# Patient Record
Sex: MALE | Race: WHITE | HISPANIC OR LATINO | Employment: OTHER | URBAN - METROPOLITAN AREA
[De-identification: names, ages, dates, MRNs, and addresses within clinical notes are randomized per-mention and may not be internally consistent; named-entity substitution may affect disease eponyms.]

---

## 2017-05-12 ENCOUNTER — RESOLUTE PROFESSIONAL BILLING HOSPITAL PROF FEE (OUTPATIENT)
Dept: HOSPITALIST | Facility: MEDICAL CENTER | Age: 22
End: 2017-05-12
Payer: COMMERCIAL

## 2017-05-12 ENCOUNTER — OFFICE VISIT (OUTPATIENT)
Dept: MEDICAL GROUP | Facility: MEDICAL CENTER | Age: 22
End: 2017-05-12
Payer: COMMERCIAL

## 2017-05-12 ENCOUNTER — HOSPITAL ENCOUNTER (INPATIENT)
Facility: MEDICAL CENTER | Age: 22
LOS: 3 days | DRG: 871 | End: 2017-05-15
Attending: EMERGENCY MEDICINE | Admitting: INTERNAL MEDICINE
Payer: COMMERCIAL

## 2017-05-12 ENCOUNTER — APPOINTMENT (OUTPATIENT)
Dept: RADIOLOGY | Facility: MEDICAL CENTER | Age: 22
DRG: 871 | End: 2017-05-12
Attending: EMERGENCY MEDICINE
Payer: COMMERCIAL

## 2017-05-12 VITALS
HEART RATE: 103 BPM | BODY MASS INDEX: 37.39 KG/M2 | OXYGEN SATURATION: 84 % | TEMPERATURE: 98.7 F | WEIGHT: 211 LBS | SYSTOLIC BLOOD PRESSURE: 118 MMHG | HEIGHT: 63 IN | DIASTOLIC BLOOD PRESSURE: 84 MMHG

## 2017-05-12 DIAGNOSIS — Q90.9 DOWN SYNDROME: ICD-10-CM

## 2017-05-12 DIAGNOSIS — J18.9 CAP (COMMUNITY ACQUIRED PNEUMONIA): ICD-10-CM

## 2017-05-12 DIAGNOSIS — J18.9 PNEUMONIA DUE TO INFECTIOUS ORGANISM, UNSPECIFIED LATERALITY, UNSPECIFIED PART OF LUNG: ICD-10-CM

## 2017-05-12 DIAGNOSIS — J06.9 UPPER RESPIRATORY TRACT INFECTION, UNSPECIFIED TYPE: ICD-10-CM

## 2017-05-12 DIAGNOSIS — R09.02 HYPOXIA: ICD-10-CM

## 2017-05-12 DIAGNOSIS — E86.0 DEHYDRATION, MODERATE: ICD-10-CM

## 2017-05-12 PROBLEM — A41.9 SEPSIS, UNSPECIFIED: Status: ACTIVE | Noted: 2017-05-12

## 2017-05-12 LAB
ALBUMIN SERPL BCP-MCNC: 3.7 G/DL (ref 3.2–4.9)
ALBUMIN/GLOB SERPL: 0.8 G/DL
ALP SERPL-CCNC: 145 U/L (ref 30–99)
ALT SERPL-CCNC: 94 U/L (ref 2–50)
ANION GAP SERPL CALC-SCNC: 9 MMOL/L (ref 0–11.9)
APPEARANCE UR: CLEAR
APTT PPP: 26.6 SEC (ref 24.7–36)
AST SERPL-CCNC: 37 U/L (ref 12–45)
BASOPHILS # BLD AUTO: 0.6 % (ref 0–1.8)
BASOPHILS # BLD: 0.03 K/UL (ref 0–0.12)
BILIRUB SERPL-MCNC: 0.4 MG/DL (ref 0.1–1.5)
BILIRUB UR QL STRIP.AUTO: NEGATIVE
BNP SERPL-MCNC: 5 PG/ML (ref 0–100)
BUN SERPL-MCNC: 12 MG/DL (ref 8–22)
CALCIUM SERPL-MCNC: 8.3 MG/DL (ref 8.4–10.2)
CHLORIDE SERPL-SCNC: 105 MMOL/L (ref 96–112)
CO2 SERPL-SCNC: 23 MMOL/L (ref 20–33)
COLOR UR: YELLOW
CREAT SERPL-MCNC: 0.81 MG/DL (ref 0.5–1.4)
EOSINOPHIL # BLD AUTO: 0 K/UL (ref 0–0.51)
EOSINOPHIL NFR BLD: 0 % (ref 0–6.9)
ERYTHROCYTE [DISTWIDTH] IN BLOOD BY AUTOMATED COUNT: 47.8 FL (ref 35.9–50)
FLUAV H1 2009 PAND RNA SPEC QL NAA+PROBE: NOT DETECTED
FLUAV RNA SPEC QL NAA+PROBE: NEGATIVE
FLUAV+FLUBV AG SPEC QL IA: NORMAL
FLUBV RNA SPEC QL NAA+PROBE: NEGATIVE
GFR SERPL CREATININE-BSD FRML MDRD: >60 ML/MIN/1.73 M 2
GLOBULIN SER CALC-MCNC: 4.4 G/DL (ref 1.9–3.5)
GLUCOSE SERPL-MCNC: 175 MG/DL (ref 65–99)
GLUCOSE UR STRIP.AUTO-MCNC: NEGATIVE MG/DL
HCT VFR BLD AUTO: 43.9 % (ref 42–52)
HGB BLD-MCNC: 15.5 G/DL (ref 14–18)
IMM GRANULOCYTES # BLD AUTO: 0.02 K/UL (ref 0–0.11)
IMM GRANULOCYTES NFR BLD AUTO: 0.4 % (ref 0–0.9)
INR PPP: 0.97 (ref 0.87–1.13)
INR PPP: 0.97 (ref 0.87–1.13)
KETONES UR STRIP.AUTO-MCNC: NEGATIVE MG/DL
LACTATE BLD-SCNC: 1.28 MMOL/L (ref 0.5–2)
LACTATE BLD-SCNC: 1.38 MMOL/L (ref 0.5–2)
LACTATE BLD-SCNC: 1.82 MMOL/L (ref 0.5–2)
LEUKOCYTE ESTERASE UR QL STRIP.AUTO: NEGATIVE
LIPASE SERPL-CCNC: 23 U/L (ref 7–58)
LYMPHOCYTES # BLD AUTO: 1.09 K/UL (ref 1–4.8)
LYMPHOCYTES NFR BLD: 20.9 % (ref 22–41)
MAGNESIUM SERPL-MCNC: 1.9 MG/DL (ref 1.5–2.5)
MCH RBC QN AUTO: 32.8 PG (ref 27–33)
MCHC RBC AUTO-ENTMCNC: 35.3 G/DL (ref 33.7–35.3)
MCV RBC AUTO: 92.8 FL (ref 81.4–97.8)
MICRO URNS: NORMAL
MONOCYTES # BLD AUTO: 0.54 K/UL (ref 0–0.85)
MONOCYTES NFR BLD AUTO: 10.3 % (ref 0–13.4)
NEUTROPHILS # BLD AUTO: 3.54 K/UL (ref 1.82–7.42)
NEUTROPHILS NFR BLD: 67.8 % (ref 44–72)
NITRITE UR QL STRIP.AUTO: NEGATIVE
NRBC # BLD AUTO: 0 K/UL
NRBC BLD AUTO-RTO: 0 /100 WBC
PH UR STRIP.AUTO: 6 [PH]
PHOSPHATE SERPL-MCNC: 3.4 MG/DL (ref 2.5–4.5)
PLATELET # BLD AUTO: 236 K/UL (ref 164–446)
PMV BLD AUTO: 9.2 FL (ref 9–12.9)
POTASSIUM SERPL-SCNC: 4.1 MMOL/L (ref 3.6–5.5)
PROT SERPL-MCNC: 8.1 G/DL (ref 6–8.2)
PROT UR QL STRIP: NEGATIVE MG/DL
PROTHROMBIN TIME: 12.7 SEC (ref 12–14.6)
PROTHROMBIN TIME: 12.7 SEC (ref 12–14.6)
RBC # BLD AUTO: 4.73 M/UL (ref 4.7–6.1)
RBC UR QL AUTO: NEGATIVE
SIGNIFICANT IND 70042: NORMAL
SITE SITE: NORMAL
SODIUM SERPL-SCNC: 137 MMOL/L (ref 135–145)
SOURCE SOURCE: NORMAL
SP GR UR STRIP.AUTO: 1.02
SPECIMEN DRAWN FROM PATIENT: NORMAL
WBC # BLD AUTO: 5.2 K/UL (ref 4.8–10.8)

## 2017-05-12 PROCEDURE — 770020 HCHG ROOM/CARE - TELE (206)

## 2017-05-12 PROCEDURE — 83735 ASSAY OF MAGNESIUM: CPT

## 2017-05-12 PROCEDURE — 700101 HCHG RX REV CODE 250: Performed by: INTERNAL MEDICINE

## 2017-05-12 PROCEDURE — 94760 N-INVAS EAR/PLS OXIMETRY 1: CPT

## 2017-05-12 PROCEDURE — 94640 AIRWAY INHALATION TREATMENT: CPT

## 2017-05-12 PROCEDURE — 87633 RESP VIRUS 12-25 TARGETS: CPT

## 2017-05-12 PROCEDURE — 83605 ASSAY OF LACTIC ACID: CPT

## 2017-05-12 PROCEDURE — 81003 URINALYSIS AUTO W/O SCOPE: CPT

## 2017-05-12 PROCEDURE — 96365 THER/PROPH/DIAG IV INF INIT: CPT

## 2017-05-12 PROCEDURE — 83690 ASSAY OF LIPASE: CPT

## 2017-05-12 PROCEDURE — 84100 ASSAY OF PHOSPHORUS: CPT

## 2017-05-12 PROCEDURE — 99223 1ST HOSP IP/OBS HIGH 75: CPT | Performed by: INTERNAL MEDICINE

## 2017-05-12 PROCEDURE — 700111 HCHG RX REV CODE 636 W/ 250 OVERRIDE (IP): Performed by: EMERGENCY MEDICINE

## 2017-05-12 PROCEDURE — 87040 BLOOD CULTURE FOR BACTERIA: CPT | Mod: 91

## 2017-05-12 PROCEDURE — 93005 ELECTROCARDIOGRAM TRACING: CPT | Performed by: INTERNAL MEDICINE

## 2017-05-12 PROCEDURE — 96367 TX/PROPH/DG ADDL SEQ IV INF: CPT

## 2017-05-12 PROCEDURE — 85025 COMPLETE CBC W/AUTO DIFF WBC: CPT

## 2017-05-12 PROCEDURE — 83880 ASSAY OF NATRIURETIC PEPTIDE: CPT

## 2017-05-12 PROCEDURE — 700105 HCHG RX REV CODE 258

## 2017-05-12 PROCEDURE — 700105 HCHG RX REV CODE 258: Performed by: EMERGENCY MEDICINE

## 2017-05-12 PROCEDURE — 87503 INFLUENZA DNA AMP PROB ADDL: CPT

## 2017-05-12 PROCEDURE — 700105 HCHG RX REV CODE 258: Performed by: INTERNAL MEDICINE

## 2017-05-12 PROCEDURE — 93010 ELECTROCARDIOGRAM REPORT: CPT | Performed by: INTERNAL MEDICINE

## 2017-05-12 PROCEDURE — 99213 OFFICE O/P EST LOW 20 MIN: CPT | Performed by: FAMILY MEDICINE

## 2017-05-12 PROCEDURE — 700111 HCHG RX REV CODE 636 W/ 250 OVERRIDE (IP)

## 2017-05-12 PROCEDURE — 87502 INFLUENZA DNA AMP PROBE: CPT

## 2017-05-12 PROCEDURE — 85610 PROTHROMBIN TIME: CPT

## 2017-05-12 PROCEDURE — 87086 URINE CULTURE/COLONY COUNT: CPT

## 2017-05-12 PROCEDURE — 86713 LEGIONELLA ANTIBODY: CPT

## 2017-05-12 PROCEDURE — 700101 HCHG RX REV CODE 250: Performed by: EMERGENCY MEDICINE

## 2017-05-12 PROCEDURE — 71010 DX-CHEST-PORTABLE (1 VIEW): CPT

## 2017-05-12 PROCEDURE — 80053 COMPREHEN METABOLIC PANEL: CPT

## 2017-05-12 PROCEDURE — 85730 THROMBOPLASTIN TIME PARTIAL: CPT

## 2017-05-12 PROCEDURE — 36415 COLL VENOUS BLD VENIPUNCTURE: CPT

## 2017-05-12 PROCEDURE — 700102 HCHG RX REV CODE 250 W/ 637 OVERRIDE(OP): Performed by: INTERNAL MEDICINE

## 2017-05-12 PROCEDURE — 87400 INFLUENZA A/B EACH AG IA: CPT

## 2017-05-12 PROCEDURE — A9270 NON-COVERED ITEM OR SERVICE: HCPCS | Performed by: INTERNAL MEDICINE

## 2017-05-12 PROCEDURE — 96375 TX/PRO/DX INJ NEW DRUG ADDON: CPT

## 2017-05-12 PROCEDURE — 700111 HCHG RX REV CODE 636 W/ 250 OVERRIDE (IP): Performed by: INTERNAL MEDICINE

## 2017-05-12 PROCEDURE — 99285 EMERGENCY DEPT VISIT HI MDM: CPT

## 2017-05-12 RX ORDER — KETOROLAC TROMETHAMINE 30 MG/ML
30 INJECTION, SOLUTION INTRAMUSCULAR; INTRAVENOUS EVERY 6 HOURS PRN
Status: DISCONTINUED | OUTPATIENT
Start: 2017-05-12 | End: 2017-05-15 | Stop reason: HOSPADM

## 2017-05-12 RX ORDER — IPRATROPIUM BROMIDE AND ALBUTEROL SULFATE 2.5; .5 MG/3ML; MG/3ML
3 SOLUTION RESPIRATORY (INHALATION)
Status: DISCONTINUED | OUTPATIENT
Start: 2017-05-12 | End: 2017-05-14

## 2017-05-12 RX ORDER — SODIUM CHLORIDE 9 MG/ML
1000 INJECTION, SOLUTION INTRAVENOUS ONCE
Status: COMPLETED | OUTPATIENT
Start: 2017-05-12 | End: 2017-05-12

## 2017-05-12 RX ORDER — IBUPROFEN 200 MG
400 TABLET ORAL EVERY 6 HOURS PRN
Status: DISCONTINUED | OUTPATIENT
Start: 2017-05-12 | End: 2017-05-13

## 2017-05-12 RX ORDER — PROMETHAZINE HYDROCHLORIDE 25 MG/1
12.5-25 TABLET ORAL EVERY 4 HOURS PRN
Status: DISCONTINUED | OUTPATIENT
Start: 2017-05-12 | End: 2017-05-15 | Stop reason: HOSPADM

## 2017-05-12 RX ORDER — ONDANSETRON 2 MG/ML
4 INJECTION INTRAMUSCULAR; INTRAVENOUS EVERY 4 HOURS PRN
Status: DISCONTINUED | OUTPATIENT
Start: 2017-05-12 | End: 2017-05-15 | Stop reason: HOSPADM

## 2017-05-12 RX ORDER — SODIUM CHLORIDE 9 MG/ML
INJECTION, SOLUTION INTRAVENOUS CONTINUOUS
Status: DISCONTINUED | OUTPATIENT
Start: 2017-05-12 | End: 2017-05-13

## 2017-05-12 RX ORDER — ONDANSETRON 4 MG/1
4 TABLET, ORALLY DISINTEGRATING ORAL EVERY 4 HOURS PRN
Status: DISCONTINUED | OUTPATIENT
Start: 2017-05-12 | End: 2017-05-15 | Stop reason: HOSPADM

## 2017-05-12 RX ORDER — POLYETHYLENE GLYCOL 3350 17 G/17G
1 POWDER, FOR SOLUTION ORAL
Status: DISCONTINUED | OUTPATIENT
Start: 2017-05-12 | End: 2017-05-15 | Stop reason: HOSPADM

## 2017-05-12 RX ORDER — ONDANSETRON 2 MG/ML
4 INJECTION INTRAMUSCULAR; INTRAVENOUS ONCE
Status: DISCONTINUED | OUTPATIENT
Start: 2017-05-12 | End: 2017-05-12

## 2017-05-12 RX ORDER — KETOROLAC TROMETHAMINE 30 MG/ML
30 INJECTION, SOLUTION INTRAMUSCULAR; INTRAVENOUS ONCE
Status: COMPLETED | OUTPATIENT
Start: 2017-05-12 | End: 2017-05-12

## 2017-05-12 RX ORDER — AMPICILLIN AND SULBACTAM 2; 1 G/1; G/1
3 INJECTION, POWDER, FOR SOLUTION INTRAMUSCULAR; INTRAVENOUS ONCE
Status: COMPLETED | OUTPATIENT
Start: 2017-05-12 | End: 2017-05-12

## 2017-05-12 RX ORDER — PROMETHAZINE HYDROCHLORIDE 25 MG/1
12.5-25 SUPPOSITORY RECTAL EVERY 4 HOURS PRN
Status: DISCONTINUED | OUTPATIENT
Start: 2017-05-12 | End: 2017-05-15 | Stop reason: HOSPADM

## 2017-05-12 RX ORDER — BISACODYL 10 MG
10 SUPPOSITORY, RECTAL RECTAL
Status: DISCONTINUED | OUTPATIENT
Start: 2017-05-12 | End: 2017-05-15 | Stop reason: HOSPADM

## 2017-05-12 RX ORDER — IPRATROPIUM BROMIDE AND ALBUTEROL SULFATE 2.5; .5 MG/3ML; MG/3ML
3 SOLUTION RESPIRATORY (INHALATION)
Status: DISCONTINUED | OUTPATIENT
Start: 2017-05-12 | End: 2017-05-15 | Stop reason: HOSPADM

## 2017-05-12 RX ORDER — SODIUM CHLORIDE 9 MG/ML
1000 INJECTION, SOLUTION INTRAVENOUS
Status: DISCONTINUED | OUTPATIENT
Start: 2017-05-12 | End: 2017-05-15 | Stop reason: HOSPADM

## 2017-05-12 RX ORDER — ACETAMINOPHEN 325 MG/1
650 TABLET ORAL EVERY 6 HOURS PRN
Status: DISCONTINUED | OUTPATIENT
Start: 2017-05-12 | End: 2017-05-15 | Stop reason: HOSPADM

## 2017-05-12 RX ORDER — SODIUM CHLORIDE 9 MG/ML
2000 INJECTION, SOLUTION INTRAVENOUS ONCE
Status: COMPLETED | OUTPATIENT
Start: 2017-05-12 | End: 2017-05-12

## 2017-05-12 RX ORDER — AMOXICILLIN 250 MG
2 CAPSULE ORAL 2 TIMES DAILY
Status: DISCONTINUED | OUTPATIENT
Start: 2017-05-12 | End: 2017-05-15 | Stop reason: HOSPADM

## 2017-05-12 RX ORDER — IPRATROPIUM BROMIDE AND ALBUTEROL SULFATE 2.5; .5 MG/3ML; MG/3ML
3 SOLUTION RESPIRATORY (INHALATION)
Status: COMPLETED | OUTPATIENT
Start: 2017-05-12 | End: 2017-05-12

## 2017-05-12 RX ADMIN — ENOXAPARIN SODIUM 40 MG: 100 INJECTION SUBCUTANEOUS at 14:39

## 2017-05-12 RX ADMIN — CEFTRIAXONE 2 G: 2 INJECTION, POWDER, FOR SOLUTION INTRAMUSCULAR; INTRAVENOUS at 16:22

## 2017-05-12 RX ADMIN — SODIUM CHLORIDE 2000 ML: 9 INJECTION, SOLUTION INTRAVENOUS at 14:20

## 2017-05-12 RX ADMIN — IBUPROFEN 400 MG: 200 TABLET, FILM COATED ORAL at 14:42

## 2017-05-12 RX ADMIN — IPRATROPIUM BROMIDE AND ALBUTEROL SULFATE 3 ML: .5; 3 SOLUTION RESPIRATORY (INHALATION) at 10:53

## 2017-05-12 RX ADMIN — SODIUM CHLORIDE 1000 ML: 9 INJECTION, SOLUTION INTRAVENOUS at 23:13

## 2017-05-12 RX ADMIN — IPRATROPIUM BROMIDE AND ALBUTEROL SULFATE 3 ML: .5; 3 SOLUTION RESPIRATORY (INHALATION) at 14:11

## 2017-05-12 RX ADMIN — AMPICILLIN SODIUM AND SULBACTAM SODIUM 3 G: 2; 1 INJECTION, POWDER, FOR SOLUTION INTRAMUSCULAR; INTRAVENOUS at 10:45

## 2017-05-12 RX ADMIN — SENNOSIDES AND DOCUSATE SODIUM 2 TABLET: 8.6; 5 TABLET ORAL at 21:59

## 2017-05-12 RX ADMIN — KETOROLAC TROMETHAMINE 30 MG: 30 INJECTION, SOLUTION INTRAMUSCULAR at 12:05

## 2017-05-12 RX ADMIN — IPRATROPIUM BROMIDE AND ALBUTEROL SULFATE 3 ML: .5; 3 SOLUTION RESPIRATORY (INHALATION) at 23:33

## 2017-05-12 RX ADMIN — SODIUM CHLORIDE 1000 ML: 9 INJECTION, SOLUTION INTRAVENOUS at 10:45

## 2017-05-12 RX ADMIN — IPRATROPIUM BROMIDE AND ALBUTEROL SULFATE 3 ML: .5; 3 SOLUTION RESPIRATORY (INHALATION) at 20:30

## 2017-05-12 RX ADMIN — SODIUM CHLORIDE: 9 INJECTION, SOLUTION INTRAVENOUS at 15:41

## 2017-05-12 RX ADMIN — AZITHROMYCIN MONOHYDRATE 500 MG: 500 INJECTION, POWDER, LYOPHILIZED, FOR SOLUTION INTRAVENOUS at 11:35

## 2017-05-12 ASSESSMENT — PAIN SCALES - GENERAL: PAINLEVEL_OUTOF10: 0

## 2017-05-12 ASSESSMENT — LIFESTYLE VARIABLES
EVER_SMOKED: NEVER
DO YOU DRINK ALCOHOL: NO
EVER_SMOKED: NEVER

## 2017-05-12 NOTE — ASSESSMENT & PLAN NOTE
This 21-year-old with Down syndrome started with URI symptoms 4 days ago. He had cough and a runny nose. 2 half days ago he began vomiting. He is drinking fluids but then immediately vomits them up. His fever has been as high as 102 but comes down with Tylenol. He had a Tylenol dose morning. Parents deny any history of diabetes.

## 2017-05-12 NOTE — IP AVS SNAPSHOT
" Home Care Instructions                                                                                                                  Name:Sander Romero  Medical Record Number:[id number]  CSN: [id number]    YOB: 1995   Age: 21 y.o.  Sex: male  HT:1.626 m (5' 4\") WT: 95.6 kg (210 lb 12.2 oz)          Admit Date: 5/12/2017     Discharge Date:   Today's Date: 5/15/2017  Attending Doctor:  Olena Morales M.D.                  Allergies:  Review of patient's allergies indicates no known allergies.            Discharge Instructions       Discharge Instructions    Discharged to home by car with relative. Discharged via wheelchair, hospital escort: Yes.  Special equipment needed: Not Applicable    Be sure to schedule a follow-up appointment with your primary care doctor or any specialists as instructed.     Discharge Plan:   Diet Plan: Discussed  Activity Level: Discussed  Confirmed Follow up Appointment: Patient to Call and Schedule Appointment  Confirmed Symptoms Management: Discussed  Medication Reconciliation Updated: Yes    I understand that a diet low in cholesterol, fat, and sodium is recommended for good health. Unless I have been given specific instructions below for another diet, I accept this instruction as my diet prescription.   Other diet: Regular diet    Special Instructions: None    · Is patient discharged on Warfarin / Coumadin?   No     · Is patient Post Blood Transfusion?  No    Depression / Suicide Risk    As you are discharged from this [facility] facility, it is important to learn how to keep safe from harming yourself.    Recognize the warning signs:  · Abrupt changes in personality, positive or negative- including increase in energy   · Giving away possessions  · Change in eating patterns- significant weight changes-  positive or negative  · Change in sleeping patterns- unable to sleep or sleeping all the time   · Unwillingness or inability to " communicate  · Depression  · Unusual sadness, discouragement and loneliness  · Talk of wanting to die  · Neglect of personal appearance   · Rebelliousness- reckless behavior  · Withdrawal from people/activities they love  · Confusion- inability to concentrate     If you or a loved one observes any of these behaviors or has concerns about self-harm, here's what you can do:  · Talk about it- your feelings and reasons for harming yourself  · Remove any means that you might use to hurt yourself (examples: pills, rope, extension cords, firearm)  · Get professional help from the community (Mental Health, Substance Abuse, psychological counseling)  · Do not be alone:Call your Safe Contact- someone whom you trust who will be there for you.  · Call your local CRISIS HOTLINE 621-4205 or 500-835-4813  · Call your local Children's Mobile Crisis Response Team [place] (857) 583-3637 or www.[employer]  · Call the toll free National Suicide Prevention Hotlines   · National Suicide Prevention Lifeline 798-961-UOED (5444)  · LSEO Line Network 800-SUICIDE (189-3404)    Pneumonia, Adult  Pneumonia is an infection of the lungs.   CAUSES  Pneumonia may be caused by bacteria or a virus. Usually, the infection is caused by breathing in droplets from an infected person's cough or sneeze.   SYMPTOMS   Symptoms of pneumonia include:  · Cough.  · Fever.  · Chest pain.  · Rapid breathing.  · Shortness of breath.  · Shaking chills.  · Mucus production.  DIAGNOSIS   If you have the common symptoms of pneumonia, often your health care provider will confirm the diagnosis with a chest X-ray. The X-ray will show an abnormality in the lung if you have pneumonia. Other tests may be done on your blood, urine, or mucus (sputum) to find the specific cause of your pneumonia. A blood gas test or pulse oximetry test may be needed to check how well your lungs are working.  TREATMENT   Your treatment will depend on whether your pneumonia  is caused by bacteria or a virus.   2. Bacterial pneumonia is treated with antibiotic medicine.  3. Pneumonia that is caused by the influenza virus may be treated with an antiviral medicine.  4. Pneumonia that is caused by a virus other than influenza will not respond to antibiotic medicine. This type of pneumonia will have to run its course.   HOME CARE INSTRUCTIONS   2. Cough suppressants may be used if you are losing too much rest from coughing at night. However, you should try to avoid taking cough suppresants. This is because coughing helps to remove mucus from your lungs.  3. Sleep in a semi-upright position at night. Try sleeping in a reclining chair, or place a few pillows under your head.  4. Try using a cold steam vaporizer or humidifier in your home or bedroom. This may help loosen your mucus.  5. If you were prescribed an antibiotic medicine, finish all of it even if you start to feel better.  6. If you were prescribed an expectorant, take it as directed by your health care provider. This medicine loosens the mucus so you can cough it up.  7. Take medicines only as directed by your health care provider.  8. Do not smoke. If you are a smoker and continue to smoke, your cough may last several weeks after your pneumonia has cleared.  9. Get rest when you feel tired, or as needed.  PREVENTION  A pneumococcal shot (vaccine) is available to prevent a common bacterial cause of pneumonia. This is usually suggested for:  2. People over 65 years old.  3. People on chemotherapy.  4. People with chronic lung problems, such as bronchitis or emphysema.  5. People with immune system problems.  If you are over 65 years old or have a high risk condition, you may receive the pneumococcal vaccine if you have not received it before. In some countries, a routine influenza vaccine is also recommended. This vaccine can help prevent some cases of pneumonia. You may be offered the influenza vaccine as part of your care.  If you  are a smoker, it is time to quit in order to prevent pneumonia in the future. You may receive instructions on how to stop smoking. Your health care provider can provide medicines and counseling to help you quit.  SEEK MEDICAL CARE IF:  2. You have a fever.  3. You cannot control your cough with suppressants at night, and you keep losing sleep.  SEEK IMMEDIATE MEDICAL CARE IF:   · You have worsening shortness of breath.  · You have increased chest pain.  · Your sickness becomes worse, especially if you are an older adult or have a weakened immune system.  · You cough up blood.  · You have pain that is getting worse or is not controlled with medicines.  · Your symptoms are getting worse rather than better.     This information is not intended to replace advice given to you by your health care provider. Make sure you discuss any questions you have with your health care provider.     Document Released: 12/18/2006 Document Revised: 01/08/2016 Document Reviewed: 04/13/2016  [employer] Interactive Patient Education ©2016 [employer]      Follow-up Information     1. Follow up with JOSE MIGUEL Slade.    Specialty:  Family Medicine    Why:  F/U WITH CARDIOLOGY IN THE NEXT FEW WEEKS,     Contact information    [address]  [address]  [facility] [id number] [phone]           Discharge Medication Instructions:    Below are the medications your physician expects you to take upon discharge:    Review all your home medications and newly ordered medications with your doctor and/or pharmacist. Follow medication instructions as directed by your doctor and/or pharmacist.    Please keep your medication list with you and share with your physician.               Medication List      START taking these medications        Instructions    Morning Afternoon Evening Bedtime    azithromycin 500 MG tablet   Commonly known as:  ZITHROMAX        Take 0.5 Tabs by mouth every day.   Dose:  250 mg                        cefdinir 300 MG  Caps   Commonly known as:  OMNICEF        Take 1 Cap by mouth 2 times a day.   Dose:  300 mg                        ipratropium-albuterol 0.5-2.5 (3) MG/3ML nebulizer solution   Last time this was given:  3 mL on 5/15/2017  6:45 AM   Commonly known as:  DUKATHIEB        Doctor's comments:  DISP FOR 1 MONTH   3 mL by Nebulization route every four hours as needed for Shortness of Breath.   Dose:  3 mL                        oseltamivir 75 MG Caps   Last time this was given:  75 mg on 5/15/2017  9:12 AM   Commonly known as:  TAMIFLU        Take 1 Cap by mouth every 12 hours.   Dose:  75 mg                          CONTINUE taking these medications        Instructions    Morning Afternoon Evening Bedtime    VITAMIN D PO        Take 1 Cap by mouth every day.   Dose:  1 Cap                             Where to Get Your Medications      You can get these medications from any pharmacy     Bring a paper prescription for each of these medications    - azithromycin 500 MG tablet  - cefdinir 300 MG Caps  - ipratropium-albuterol 0.5-2.5 (3) MG/3ML nebulizer solution  - oseltamivir 75 MG Caps            Orders for after discharge     DME Nebulizer    Complete by:  As directed    Small volume nebulizer and supplies.       DME Pulse Oximeter    Complete by:  As directed    [id number]             Instructions           Diet / Nutrition:    Follow any diet instructions given to you by your doctor or the dietician, including how much salt (sodium) you are allowed each day.    If you are overweight, talk to your doctor about a weight reduction plan.    Activity:    Remain physically active following your doctor's instructions about exercise and activity.    Rest often.     Any time you become even a little tired or short of breath, SIT DOWN and rest.    Worsening Symptoms:    Report any of the following signs and symptoms to the doctor's office immediately:    *Pain of jaw, arm, or neck  *Chest pain not relieved by medication                                *Dizziness or loss of consciousness  *Difficulty breathing even when at rest   *More tired than usual                                       *Bleeding drainage or swelling of surgical site  *Swelling of feet, ankles, legs or stomach                 *Fever (>100ºF)  *Pink or blood tinged sputum  *Weight gain (3lbs/day or 5lbs /week)           *Shock from internal defibrillator (if applicable)  *Palpitations or irregular heartbeats                *Cool and/or numb extremities    Stroke Awareness    Common Risk Factors for Stroke include:    Age  Atrial Fibrillation  Carotid Artery Stenosis  Diabetes Mellitus  Excessive alcohol consumption  High blood pressure  Overweight   Physical inactivity  Smoking    Warning signs and symptoms of a stroke include:    *Sudden numbness or weakness of the face, arm or leg (especially on one side of the body).  *Sudden confusion, trouble speaking or understanding.  *Sudden trouble seeing in one or both eyes.  *Sudden trouble walking, dizziness, loss of balance or coordination.Sudden severe headache with no known cause.    It is very important to get treatment quickly when a stroke occurs. If you experience any of the above warning signs, call 911 immediately.                   Disclaimer         Quit Smoking / Tobacco Use:    I understand the use of any tobacco products increases my chance of suffering from future heart disease or stroke and could cause other illnesses which may shorten my life. Quitting the use of tobacco products is the single most important thing I can do to improve my health. For further information on smoking / tobacco cessation call a Toll Free Quit Line at 1-649.378.8406 (*National Cancer [place]) or 1-150.792.1279 (American Lung Association) or you can access the web based program at www.lungusa.org.    [place] Tobacco Users Help Line:  (789) 762-9209       Toll Free: 1-171.118.5406  Quit Tobacco Program [facility]  (966) 275-2293    Crisis Hotline:    [place] Crisis Hotline:  8-743-XSGFAWY or 1-843.544.7500    [place] Crisis Hotline:    1-525.789.2504 or 890-604-5112    Discharge Survey:   Thank you for choosing [facility]. We hope we did everything we could to make your hospital stay a pleasant one. You may be receiving a phone survey and we would appreciate your time and participation in answering the questions. Your input is very valuable to us in our efforts to improve our service to our patients and their families.        My signature on this form indicates that:    1. I have reviewed and understand the above information.  2. My questions regarding this information have been answered to my satisfaction.  3. I have formulated a plan with my discharge nurse to obtain my prescribed medications for home.                  Disclaimer         __________________________________                     __________       ________                       Patient Signature                                                 Date                    Time

## 2017-05-12 NOTE — ED PROVIDER NOTES
"ED Provider Note    CHIEF COMPLAINT  Chief Complaint   Patient presents with   • Cough   • N/V       HPI  Sander Romero is a 21 y.o. male who presents with cough, cold, nausea and vomiting. Patient has Down syndrome. He was sent from urgent care. He was hypoxic there at urgent care. According to family said cough cold congestion for the last few days. He is a little bit of nausea and vomiting. Difficult to obtain her review of systems from the patient. But according the family no obvious headache nor chest pain or abdominal pain.    REVIEW OF SYSTEMS  Occult to obtain because of the patient's Down syndrome.  ALL OTHER SYSTEMS NEGATIVE    ALLERGIES  No Known Allergies        PAST MEDICAL HISTORY  Past Medical History   Diagnosis Date   • Down syndrome      trisomy 21   • Impaired fasting glucose    • Vitamin D deficiency    • Low HDL (under 40)    • Sleep apnea      work up done as a child.  no tx   • Undescended testicle        SURGICAL HISTORY  Past Surgical History   Procedure Laterality Date   • Other       surgery for undescended testies.   • Tonsillectomy and adenoidectomy     • Testicle exploration       undescended testicle   • Myringotomy         FAMILY HISTORY  Family History   Problem Relation Age of Onset   • Heart Disease Mother      congenital heart defect   • Diabetes Maternal Grandfather    • Diabetes Paternal Grandmother    • Diabetes Paternal Grandfather        SOCIAL HISTORY  Family at the bedside. Patient has Down syndrome.    PHYSICAL EXAM  GENERAL: Awake male adult with obvious Down syndrome characteristics.  VITAL SIGNS: /70 mmHg  Pulse 108  Temp(Src) 36.9 °C (98.5 °F)  Resp 20  Ht 1.626 m (5' 4\")  Wt 95.6 kg (210 lb 12.2 oz)  BMI 36.16 kg/m2   Constitutional: Awake male adult HENT: Scalp is normal size and nontender. Ears are clear. Nose is clear. Throat is clear with no stridor no drooling no trismus. Teeth are all intact.  Eyes: Pupils equal round and reactive to light, " extraocular motor fall. There is no scleral icterus.  Neck: Neck is supple and nontender. There is no meningismus. No adenitis. No thyromegaly.  Lymphatic: No adenopathy.   Cardiovascular: Heart regular rhythm without murmurs or gallops   Thorax & Lungs: No chest wall tenderness. Lungs are congested. Patient has good breath sounds bilateral. [place] Rales and rhonchi  Abdomen: Abdomen is soft, nontender, not rigid, no guarding, and no organomegaly. There is no palpable hernia   Skin: Warm, pink, and dry with no erythema and no rash.   Back: Nontender, no midline bony tenderness, no flank tenderness.  Extremities: Full range of motion  No tenderness to palpation and no deformities noted. No calf or thigh swelling. No calf or thigh tenderness. No clinical DVT.  Neurologic: Alert & oriented . Cranial nerves are grossly intact as tested. Patient moves all 4 extremities well. Patient has good strong flexion and extension of the ankle joints knee joints hip joints and elbow joints. Sensation is normal and symmetrical in the upper and lower extremities.   Psychiatric: Patient is alert oriented coherent and rational.     RADIOLOGY/PROCEDURES  DX-CHEST-PORTABLE (1 VIEW)   Final Result      1.  Bilateral pulmonary airspace process.      2.  Differential diagnosis includes atypical pneumonia or edema            COURSE & MEDICAL DECISION MAKING  Patient arrives for evaluation of cough and difficulty breathing with nausea and vomiting. Differential diagnosis: Bronchitis, pneumonia, sepsis, dehydration, etc.    Plan: #1 IV #2 cardiac monitor, pulse ox monitor, blood pressure monitor. #3 a bolus of IV fluids #4. Lab including CBC, CMP, blood cultures, influenza screen, lactate level, etc. Chest x-ray. #5. Intravenous Zithromax and Rocephin after blood cultures obtained #7. Admission to the hospital after ED evaluation. #8. Review of previous medical records    Review of previous medical records: Down syndrome.    Laboratory and  reexamination: CBC is normal. No anemia. No bandemia. INR is normal. Chemistry panel is normal. Glucose 175. Alk phos and SGPT are elevated slightly. Chest x-ray and symptomatology and physical findings are consistent with pneumonia.  Results for orders placed or performed during the hospital encounter of 05/12/17   CBC w/ Differential   Result Value Ref Range    WBC 5.2 4.8 - 10.8 K/uL    RBC 4.73 4.70 - 6.10 M/uL    Hemoglobin 15.5 14.0 - 18.0 g/dL    Hematocrit 43.9 42.0 - 52.0 %    MCV 92.8 81.4 - 97.8 fL    MCH 32.8 27.0 - 33.0 pg    MCHC 35.3 33.7 - 35.3 g/dL    RDW 47.8 35.9 - 50.0 fL    Platelet Count 236 164 - 446 K/uL    MPV 9.2 9.0 - 12.9 fL    Neutrophils-Polys 67.80 44.00 - 72.00 %    Lymphocytes 20.90 (L) 22.00 - 41.00 %    Monocytes 10.30 0.00 - 13.40 %    Eosinophils 0.00 0.00 - 6.90 %    Basophils 0.60 0.00 - 1.80 %    Immature Granulocytes 0.40 0.00 - 0.90 %    Nucleated RBC 0.00 /100 WBC    Neutrophils (Absolute) 3.54 1.82 - 7.42 K/uL    Lymphs (Absolute) 1.09 1.00 - 4.80 K/uL    Monos (Absolute) 0.54 0.00 - 0.85 K/uL    Eos (Absolute) 0.00 0.00 - 0.51 K/uL    Baso (Absolute) 0.03 0.00 - 0.12 K/uL    Immature Granulocytes (abs) 0.02 0.00 - 0.11 K/uL    NRBC (Absolute) 0.00 K/uL   Complete Metabolic Panel (CMP)   Result Value Ref Range    Sodium 137 135 - 145 mmol/L    Potassium 4.1 3.6 - 5.5 mmol/L    Chloride 105 96 - 112 mmol/L    Co2 23 20 - 33 mmol/L    Anion Gap 9.0 0.0 - 11.9    Glucose 175 (H) 65 - 99 mg/dL    Bun 12 8 - 22 mg/dL    Creatinine 0.81 0.50 - 1.40 mg/dL    Calcium 8.3 (L) 8.4 - 10.2 mg/dL    AST(SGOT) 37 12 - 45 U/L    ALT(SGPT) 94 (H) 2 - 50 U/L    Alkaline Phosphatase 145 (H) 30 - 99 U/L    Total Bilirubin 0.4 0.1 - 1.5 mg/dL    Albumin 3.7 3.2 - 4.9 g/dL    Total Protein 8.1 6.0 - 8.2 g/dL    Globulin 4.4 (H) 1.9 - 3.5 g/dL    A-G Ratio 0.8 g/dL   Lipase   Result Value Ref Range    Lipase 23 7 - 58 U/L   LACTIC ACID   Result Value Ref Range    Lactic Acid 1.82 0.50 - 2.00  mmol/L    Specimen Venous    PT/INR   Result Value Ref Range    PT 12.7 12.0 - 14.6 sec    INR 0.97 0.87 - 1.13   Influenza Rapid   Result Value Ref Range    Significant Indicator NEG     Source RESP     Site RESPIRATORY     Rapid Influenza A-B       Negative for Influenza A and Influenza B antigens.  Infection due to influenza A or B cannot be ruled out  since the antigen present in the specimen may be below the  detection limit of the test. Culture confirmation of  negative samples is recommended.     ESTIMATED GFR   Result Value Ref Range    GFR If African American >60 >60 mL/min/1.73 m 2    GFR If Non African American >60 >60 mL/min/1.73 m 2         The hospital assessment: The case discussed. The patient stable on admission after 12 noon.  FINAL IMPRESSION  1. Hypoxia and difficulty breathing  2. Pneumonia  3. Down syndrome         Electronically signed by: Gary Gansert, 5/12/2017 /noon

## 2017-05-12 NOTE — FLOWSHEET NOTE
Patient admitted to med/tele and placed on Q 4 nebulizer as ordered.     05/12/17 1410   RT Assessment of Delivered Medications   Evaluation of Medication Delivery Daily Yes-- Pt /Family has been Instructed in use of Respiratory Medications and Adverse Reactions   SVN Group   #SVN Performed Yes   Given By: Mask   Chest Exam   Work Of Breathing / Effort Mild   Respiration (!) 22   Pulse 98   Breath Sounds   RUL Breath Sounds Coarse Crackles;Diminished   RML Breath Sounds Coarse Crackles;Diminished   RLL Breath Sounds Coarse Crackles;Diminished   NIKHIL Breath Sounds Coarse Crackles;Diminished   LLL Breath Sounds Coarse Crackles;Diminished   Secretions   Cough Congested;Moist;Non Productive   Oximetry   #Pulse Oximetry (Single Determination) Yes   Oxygen   Home O2 Use Prior To Admission? No   Pulse Oximetry 92 %   O2 (LPM) 5   O2 Daily Delivery Respiratory  OxyMask

## 2017-05-12 NOTE — ED NOTES
Downs child with some trouble communicating states after prompted by parents that his head hurts. Ne has had N/V for several days. Parents think it's the flu. Last Tylenol at 8am, snoring and 28/min resp.

## 2017-05-12 NOTE — H&P
PRIMARY CARE PHYSICIAN:  EDWIN Navarrete    CHIEF COMPLAINT:  Fevers, cough, runny nose, and nausea and vomiting.    HISTORY OF PRESENTING ILLNESS:  This is a 21-year-old male with history of   Down syndrome who is currently living and being cared for by his parents.    Much of the history of presenting illness is obtained from patient's father   who is at bedside helping with history of presenting illness.  According to   the patient's father, patient has underlying Down syndrome.  He is mostly   nonverbal and does not talk to people.  It appeared that the patient has been   having a runny nose, which developed 4 days ago.  This was followed by   development of cough and fever 3 days ago.  According to the father, he is   uncertain if the cough has been productive or not.  The patient has been   having ongoing nausea and vomiting over the course of last 3-4 days.  Prior to   coming into the ER today, he vomited 4 times which were nonbloody and   nonbilious.  Otherwise, he has been having ongoing runny nose, viral symptoms   including myalgias, arthralgias, and headaches.  Otherwise, patient denies any   chest pain.  There has been ongoing shortness of breath, otherwise no   abdominal pain, diarrhea, constipation, blood in the bowel movements is noted.    No dysuria.  No increased frequency, urgency, or hematuria.  No lower   extremity swelling.  Patient at baseline walks by himself.  According to   patient's father, he has been increasingly lethargic and weak with his recent   illness.    HOME MEDICATIONS:  Vitamin D daily.    PAST MEDICAL HISTORY:  1.  Down syndrome.  2.  Vitamin D deficiency.  3.  Low HDL.  4.  Sleep apnea.  5.  Undescended testes.    PAST SURGICAL HISTORY:  1.  Tonsillectomy.  2.  Myringotomy.  3.  Testicular exploration.  4.  Adenoidectomy.    FAMILY HISTORY:  Grandfather with history of coronary artery disease,   otherwise noncontributory according to the patient's father.  Upon review of    electronic medical record system, it appears patient's mother has a history of   congenital heart defects and diabetes for maternal grandfather and paternal   grandmother.    SOCIAL HISTORY:  According to the patient's father, patient does not smoke,   use alcohol, or use drugs of abuse.  He is currently living with his parents.    ALLERGIES:  Patient has no known allergies to any medications.    REVIEW OF SYSTEMS:  Detailed review of systems was obtained to the best of my   ability given this patient's underlying Down syndrome and is negative other   than as listed in the history of presenting illness and past medical history.    PHYSICAL EXAMINATION:  VITAL SIGNS:  Temperature of 38.3 degrees Celsius, pulse of 108, respiratory   rate of 24, blood pressure of 95/58, weight of 95.6 kg, and oxygen saturation   of 98% on 6 liters of OxyMask.  GENERAL:  Patient is nonverbal.  He is not answering my questions or   maintaining eye contact with me.  HEAD, EYES, AND ENT:  Head is normocephalic.  Bilateral pupils are equal,   round, and reactive to light.  No conjunctival pallor or scleral icterus is   noted.  NECK:  No jugular venous distention.  CARDIOVASCULAR:  Tachycardia, S1 plus S2.  No murmurs, rubs, or gallops.  RESPIRATIONS:  Extensive bilateral basal rhonchi, most profound in the left   lower lobe.  Concern for underlying left lower lobe bronchial breath sounds,   otherwise patient noncompliant with coughing during examination to see if   rhonchi improve with cough.  ABDOMEN:  Soft, nontender, and nondistended.  Bowel sounds positive and normal   in frequency.  GENITOURINARY:  Not examined.  MUSCULOSKELETAL:  No joint swelling or tenderness on examination.  SKIN:  No rashes, erythema, or wounds.  NEUROLOGICAL:  Cranial nerves without any gross deficit.  Motor strength of   5/5 in bilateral upper and lower extremities.  EXTREMITIES:  Pulses 2+ in bilateral lower extremities.  No edema.  No    cyanosis.    LABORATORY STUDIES:  White blood cell count of 5.2, hemoglobin of 15.5, and   platelet count of 236.  Sodium of 137, potassium of 4.1, BUN of 12, and   creatinine of 0.81.  AST of 37, ALT of 94, alkaline phosphatase of 145, and   bilirubin of 0.4.  Lactic acid level of 1.82.  INR of 0.97.    IMAGING STUDIES:  Chest x-ray obtained on presentation today reveals bilateral   pulmonary airspace process most concerning for underlying pneumonia.  Based   on my personal review and images reviewed by me, this appears concerning for   underlying multifocal pneumonia with bilateral lower lobes and middle lobe   infiltrates.    IMPRESSION:  1.  Sepsis.  2.  Community-acquired pneumonia.  3.  Intractable nausea and vomiting.  4.  Chronic transaminitis.  5.  History of Down syndrome.    PLAN:  At this point, patient will be admitted to the hospital.  He presents   with sepsis with presumed source of sepsis being pulmonary at this point in   time.  Patient will receive sepsis protocol IV fluid hydration in the   emergency room.  Blood cultures have been obtained.  We will attempt   urinalysis to rule out underlying urinary source of infection leading to his   presentation.  Sputum cultures will be attempted.  Otherwise, at this point in   time, patient will be initiated on intravenous ceftriaxone and azithromycin   therapy.  Antibiotics will be deescalated as clinically appropriate.  To note,   patient does not have any evidence of bandemia or leukocytosis.  This may be   concerning for underlying viral etiology of his presentation.  Patient has a   negative influenza screening in the emergency room.  I would send out a   respiratory viral panel by PCR for further evaluation of viral etiologies of   this patient's presentation.  Meanwhile, he will be maintained on gentle IV   fluid hydration and his lactic acid levels will be monitored during his   hospital stay per sepsis protocol.  For his underlying intractable  nausea and   vomiting, he will be maintained on symptomatic control for his headache and   viral symptoms.  He will be maintained on nonsteroidal anti-inflammatory   agents as needed.  Otherwise, this patient has chronic transaminitis.  I am   uncertain if this has been worked out in the past or not.  We would recommend   ongoing monitoring during his hospital stay and further evaluation and   management per his primary care physician.  Otherwise, DVT preventive   prophylaxis with sequential compression device and subcutaneous enoxaparin has   been ordered.  Stress ulcer prophylaxes are not indicated.  Patient is   admitted to the hospital under a full code status.       ____________________________________     MD ROBERT LAO / BRE    DD:  05/12/2017 12:31:32  DT:  05/12/2017 15:41:38    D#:  [id number]  Job#:  [id number]

## 2017-05-12 NOTE — IP AVS SNAPSHOT
Patentspin Access Code: [email or web address]  Expires: 6/14/2017 10:02 AM    Patentspin  A secure, online tool to manage your health information     [employer]’s Patentspin® is a secure, online tool that connects you to your personalized health information from the privacy of your home -- day or night - making it very easy for you to manage your healthcare. Once the activation process is completed, you can even access your medical information using the Patentspin edy, which is available for free in the Apple Edy store or Google Play store.     Patentspin provides the following levels of access (as shown below):   My Chart Features   [facility] Primary Care Doctor [facility]  Specialists [facility]  Urgent  Care Non-[facility]  Primary Care  Doctor   Email your healthcare team securely and privately 24/7 X X X X   Manage appointments: schedule your next appointment; view details of past/upcoming appointments X      Request prescription refills. X      View recent personal medical records, including lab and immunizations X X X X   View health record, including health history, allergies, medications X X X X   Read reports about your outpatient visits, procedures, consult and ER notes X X X X   See your discharge summary, which is a recap of your hospital and/or ER visit that includes your diagnosis, lab results, and care plan. X X       How to register for Patentspin:  1. Go to  [email or web address].  2. Click on the Sign Up Now box, which takes you to the New Member Sign Up page. You will need to provide the following information:  a. Enter your Patentspin Access Code exactly as it appears at the top of this page. (You will not need to use this code after you’ve completed the sign-up process. If you do not sign up before the expiration date, you must request a new code.)   b. Enter your date of birth.   c. Enter your home email address.   d. Click Submit, and follow the next screen’s instructions.  3. Create a Patentspin ID. This will be your Patentspin  login ID and cannot be changed, so think of one that is secure and easy to remember.  4. Create a [employer] Mobile password. You can change your password at any time.  5. Enter your Password Reset Question and Answer. This can be used at a later time if you forget your password.   6. Enter your e-mail address. This allows you to receive e-mail notifications when new information is available in AirCast Mobile.  7. Click Sign Up. You can now view your health information.    For assistance activating your AirCast Mobile account, call [phone]

## 2017-05-12 NOTE — PROGRESS NOTES
"Subjective:     Chief Complaint   Patient presents with   • Cough   • Fever   • Emesis       Sander Romero is a 21 y.o. male here today for evaluation and management of:    URI (upper respiratory infection)  This 21-year-old with Down syndrome started with URI symptoms 4 days ago. He had cough and a runny nose. 2 half days ago he began vomiting. He is drinking fluids but then immediately vomits them up. His fever has been as high as 102 but comes down with Tylenol. He had a Tylenol dose morning. Parents deny any history of diabetes.       No Known Allergies    Current medicines (including changes today)  Current Outpatient Prescriptions   Medication Sig Dispense Refill   • vitamin D, Ergocalciferol, (DRISDOL) 25781 UNITS Cap capsule Take 1 Cap by mouth every 7 days. 12 Cap 0     No current facility-administered medications for this visit.       He  has a past medical history of Down syndrome; Impaired fasting glucose; Vitamin D deficiency; Low HDL (under 40); Sleep apnea; and Undescended testicle.    Patient Active Problem List    Diagnosis Date Noted   • Hypoxia 05/12/2017   • Dehydration, moderate 05/12/2017   • URI (upper respiratory infection) 05/12/2017   • Low HDL (under 40)    • Vitamin D deficiency    • Impaired fasting glucose    • Sleep apnea    • Undescended testicle    • Down syndrome        ROS   No  palpitations.   No pain with urination or hematuria.  No black or bloody stools.       Objective:     Blood pressure 118/84, pulse 103, temperature 37.1 °C (98.7 °F), height 1.588 m (5' 2.52\"), weight 95.709 kg (211 lb), SpO2 84 %. Body mass index is 37.95 kg/(m^2).   Physical Exam:  Ill-appearing obese male.  Awake but decreased alertness.  Eye contact is poo  Mouth: Oral mucous membranes are dry  Neck Supple.  No adenopathy or masses in the neck or supraclavicular regions. No thyromegaly  Lungs: Decreased breath sounds throughout without rhonchi, wheezes or crackles. Some increased work of " breathing   CV: . No mu tachycardicrmur      Assessment and Plan:   The following treatment plan was discussed    1. Hypoxia  Discussed with parents that given his dehydration and hypoxia we will escorted to the emergency room for further evaluation and treatment. Concern for pneumonia and dehydration. Diabetes should also be ruled out. I spoke with Dr. Gary Gansert in the emergency room and he will accept the patient to be looking for him.     2. Down syndrome      3. Dehydration, moderate  See #1    4. Upper respiratory tract infection, unspecified type  See #1    Any change or worsening of signs or symptoms, patient encouraged to follow-up or report to the emergency room for further evaluation. Patient understands and agrees.    Followup: Return sent to ER.

## 2017-05-12 NOTE — IP AVS SNAPSHOT
5/15/2017    Sander Romero  [address]  [place] [address]    Dear Sander:    [facility] wants to ensure your discharge home is safe and you or your loved ones have had all of your questions answered regarding your care after you leave the hospital.    Below is a list of resources and contact information should you have any questions regarding your hospital stay, follow-up instructions, or active medical symptoms.    Questions or Concerns Regarding… Contact   Medical Questions Related to Your Discharge  (7 days a week, 8am-5pm) Contact a Nurse Care Coordinator   [phone]   Medical Questions Not Related to Your Discharge  (24 hours a day / 7 days a week)  Contact the Nurse Health Line   [phone]    Medications or Discharge Instructions Refer to your discharge packet   or contact your [facility] Primary Care Provider   [phone]   Follow-up Appointment(s) Schedule your appointment via Etaoshi   or contact Scheduling [phone]   Billing Review your statement via E[employer]oshi  or contact Billing [phone]   Medical Records Review your records via E[employer]oshi   or contact Medical Records [phone]     You may receive a telephone call within two days of discharge. This call is to make certain you understand your discharge instructions and have the opportunity to have any questions answered. You can also easily access your medical information, test results and upcoming appointments via the Etaoshi free online health management tool. You can learn more and sign up at [employer]/Etaoshi. For assistance setting up your Etaoshi account, please call [phone].    Once again, we want to ensure your discharge home is safe and that you have a clear understanding of any next steps in your care. If you have any questions or concerns, please do not hesitate to contact us, we are here for you. Thank you for choosing [facility] for your healthcare needs.    Sincerely,    Your [facility] Healthcare Team

## 2017-05-12 NOTE — MR AVS SNAPSHOT
"        Sander Romero   2017 9:20 AM   Office Visit   MRN: [id number]    Department:  [facility]   Dept Phone:  [phone]    Description:  Male : 1995   Provider:  Yeni Rhoades M.D.           Reason for Visit     Cough     Fever     Emesis           Allergies as of 2017     No Known Allergies      You were diagnosed with     Hypoxia   [359354]       Down syndrome   [287815]       Dehydration, moderate   [889577]       Upper respiratory tract infection, unspecified type   [4074193]         Vital Signs     Blood Pressure Pulse Temperature Height Weight Body Mass Index    118/84 mmHg 103 37.1 °C (98.7 °F) 1.588 m (5' 2.52\") 95.709 kg (211 lb) 37.95 kg/m2    Oxygen Saturation Smoking Status                84% Never Smoker           Basic Information     Date Of Birth Sex Race Ethnicity Preferred Language    1995 Male  or   Origin (Albanian,Zimbabwean,[place]ian,[place], etc) English      Problem List              ICD-10-CM Priority Class Noted - Resolved    Down syndrome Q90.9   Unknown - Present    Low HDL (under 40) E78.6   Unknown - Present    Vitamin D deficiency E55.9   Unknown - Present    Impaired fasting glucose R73.01   Unknown - Present    Sleep apnea G47.30   Unknown - Present    Undescended testicle Q53.9   Unknown - Present    Hypoxia R09.02   2017 - Present    Dehydration, moderate E86.0   2017 - Present    URI (upper respiratory infection) J06.9   2017 - Present      Health Maintenance        Date Due Completion Dates    IMM HPV VACCINE (2 of 3 - Male 3 Dose Series) 3/26/2012 2012    IMM DTaP/Tdap/Td Vaccine (2 - Td) 2025            Current Immunizations     HPV Quadrivalent Vaccine (GARDASIL) 2012    Hepatitis A Vaccine, Ped/Adol 7/3/2002, 1/3/2002    Hepatitis B Vaccine Recombivax (Adol/Adult) 3/22/1996, 1996, 1995    Influenza Vaccine Quad Inj (Pf) 12/3/2015    Meningococcal Conjugate Vaccine " MCV4 (Menactra) 1/30/2012, 5/30/2007    Tdap Vaccine 12/7/2015  9:27 AM    Tuberculin Skin Test 12/15/2016, 10/20/2014    Varicella Vaccine Live 10/15/1999, 8/12/1997      Below and/or attached are the medications your provider expects you to take. Review all of your home medications and newly ordered medications with your provider and/or pharmacist. Follow medication instructions as directed by your provider and/or pharmacist. Please keep your medication list with you and share with your provider. Update the information when medications are discontinued, doses are changed, or new medications (including over-the-counter products) are added; and carry medication information at all times in the event of emergency situations     Allergies:  No Known Allergies          Medications  Valid as of: May 12, 2017 - 11:33 AM    Generic Name Brand Name Tablet Size Instructions for use    Cholecalciferol   Take 1 Cap by mouth every day.        .                 Medicines prescribed today were sent to:     None      Medication refill instructions:       If your prescription bottle indicates you have medication refills left, it is not necessary to call your provider’s office. Please contact your pharmacy and they will refill your medication.    If your prescription bottle indicates you do not have any refills left, you may request refills at any time through one of the following ways: The online [employer]ewi system (except Urgent Care), by calling your provider’s office, or by asking your pharmacy to contact your provider’s office with a refill request. Medication refills are processed only during regular business hours and may not be available until the next business day. Your provider may request additional information or to have a follow-up visit with you prior to refilling your medication.   *Please Note: Medication refills are assigned a new Rx number when refilled electronically. Your pharmacy may indicate that no refills were  authorized even though a new prescription for the same medication is available at the pharmacy. Please request the medicine by name with the pharmacy before contacting your provider for a refill.           [employer] Access Code: Activation code not generated  Current [employer]vy Status: Active

## 2017-05-12 NOTE — IP AVS SNAPSHOT
" <p align=\"LEFT\"><IMG SRC=\"//[email or web address]\" alt=\"Image\" WIDTH=\"50%\" HEIGHT=\"200\" BORDER=\"\"></p>                   Name:Sander Romero  Medical Record Number:[id number]  CSN: [id number]    YOB: 1995   Age: 21 y.o.  Sex: male  HT:1.626 m (5' 4\") WT: 95.6 kg (210 lb 12.2 oz)          Admit Date: 5/12/2017     Discharge Date:   Today's Date: 5/15/2017  Attending Doctor:  Olena Morales M.D.                  Allergies:  Review of patient's allergies indicates no known allergies.          Follow-up Information     1. Follow up with JOSE MIGUEL Slade.    Specialty:  Family Medicine    Why:  F/U WITH CARDIOLOGY IN THE NEXT FEW WEEKS,     Contact information    [id number]  [facility] [id number] [phone]           Medication List      Take these Medications        Instructions    azithromycin 500 MG tablet   Commonly known as:  ZITHROMAX    Take 0.5 Tabs by mouth every day.   Dose:  250 mg       cefdinir 300 MG Caps   Commonly known as:  OMNICEF    Take 1 Cap by mouth 2 times a day.   Dose:  300 mg       ipratropium-albuterol 0.5-2.5 (3) MG/3ML nebulizer solution   Commonly known as:  DUONEB    Doctor's comments:  DISP FOR 1 MONTH   3 mL by Nebulization route every four hours as needed for Shortness of Breath.   Dose:  3 mL       oseltamivir 75 MG Caps   Commonly known as:  TAMIFLU    Take 1 Cap by mouth every 12 hours.   Dose:  75 mg       VITAMIN D PO    Take 1 Cap by mouth every day.   Dose:  1 Cap         "

## 2017-05-12 NOTE — ED NOTES
Saline lock inserted and lab and culture draw and sent to the lab, 2nd set also drawn and sent with influenza swab. ERP at bedside.

## 2017-05-13 LAB
ALBUMIN SERPL BCP-MCNC: 2.8 G/DL (ref 3.2–4.9)
ALBUMIN/GLOB SERPL: 0.7 G/DL
ALP SERPL-CCNC: 110 U/L (ref 30–99)
ALT SERPL-CCNC: 64 U/L (ref 2–50)
ANION GAP SERPL CALC-SCNC: 8 MMOL/L (ref 0–11.9)
AST SERPL-CCNC: 25 U/L (ref 12–45)
BASOPHILS # BLD AUTO: 0 % (ref 0–1.8)
BASOPHILS # BLD: 0 K/UL (ref 0–0.12)
BILIRUB SERPL-MCNC: 0.6 MG/DL (ref 0.1–1.5)
BUN SERPL-MCNC: 6 MG/DL (ref 8–22)
CALCIUM SERPL-MCNC: 7.9 MG/DL (ref 8.4–10.2)
CHLORIDE SERPL-SCNC: 108 MMOL/L (ref 96–112)
CO2 SERPL-SCNC: 22 MMOL/L (ref 20–33)
CREAT SERPL-MCNC: 0.74 MG/DL (ref 0.5–1.4)
EOSINOPHIL # BLD AUTO: 0 K/UL (ref 0–0.51)
EOSINOPHIL NFR BLD: 0 % (ref 0–6.9)
ERYTHROCYTE [DISTWIDTH] IN BLOOD BY AUTOMATED COUNT: 50.9 FL (ref 35.9–50)
GFR SERPL CREATININE-BSD FRML MDRD: >60 ML/MIN/1.73 M 2
GLOBULIN SER CALC-MCNC: 3.8 G/DL (ref 1.9–3.5)
GLUCOSE SERPL-MCNC: 116 MG/DL (ref 65–99)
HCT VFR BLD AUTO: 42.3 % (ref 42–52)
HGB BLD-MCNC: 14.3 G/DL (ref 14–18)
LYMPHOCYTES # BLD AUTO: 1.74 K/UL (ref 1–4.8)
LYMPHOCYTES NFR BLD: 56 % (ref 22–41)
MANUAL DIFF BLD: NORMAL
MCH RBC QN AUTO: 32.9 PG (ref 27–33)
MCHC RBC AUTO-ENTMCNC: 33.8 G/DL (ref 33.7–35.3)
MCV RBC AUTO: 97.2 FL (ref 81.4–97.8)
MONOCYTES # BLD AUTO: 0.4 K/UL (ref 0–0.85)
MONOCYTES NFR BLD AUTO: 13 % (ref 0–13.4)
NEUTROPHILS # BLD AUTO: 0.96 K/UL (ref 1.82–7.42)
NEUTROPHILS NFR BLD: 27 % (ref 44–72)
NEUTS BAND NFR BLD MANUAL: 4 % (ref 0–10)
NRBC # BLD AUTO: 0 K/UL
NRBC BLD AUTO-RTO: 0 /100 WBC
PLATELET # BLD AUTO: 209 K/UL (ref 164–446)
PLATELET BLD QL SMEAR: NORMAL
PMV BLD AUTO: 9.2 FL (ref 9–12.9)
POTASSIUM SERPL-SCNC: 3.8 MMOL/L (ref 3.6–5.5)
PROT SERPL-MCNC: 6.6 G/DL (ref 6–8.2)
RBC # BLD AUTO: 4.35 M/UL (ref 4.7–6.1)
RBC BLD AUTO: NORMAL
SODIUM SERPL-SCNC: 138 MMOL/L (ref 135–145)
VARIANT LYMPHS BLD QL SMEAR: NORMAL
WBC # BLD AUTO: 3.1 K/UL (ref 4.8–10.8)

## 2017-05-13 PROCEDURE — 85027 COMPLETE CBC AUTOMATED: CPT

## 2017-05-13 PROCEDURE — 94760 N-INVAS EAR/PLS OXIMETRY 1: CPT

## 2017-05-13 PROCEDURE — 700105 HCHG RX REV CODE 258: Performed by: INTERNAL MEDICINE

## 2017-05-13 PROCEDURE — 94640 AIRWAY INHALATION TREATMENT: CPT

## 2017-05-13 PROCEDURE — A9270 NON-COVERED ITEM OR SERVICE: HCPCS | Performed by: INTERNAL MEDICINE

## 2017-05-13 PROCEDURE — 85007 BL SMEAR W/DIFF WBC COUNT: CPT

## 2017-05-13 PROCEDURE — 770020 HCHG ROOM/CARE - TELE (206)

## 2017-05-13 PROCEDURE — 99232 SBSQ HOSP IP/OBS MODERATE 35: CPT | Performed by: FAMILY MEDICINE

## 2017-05-13 PROCEDURE — A9270 NON-COVERED ITEM OR SERVICE: HCPCS | Performed by: FAMILY MEDICINE

## 2017-05-13 PROCEDURE — 80053 COMPREHEN METABOLIC PANEL: CPT

## 2017-05-13 PROCEDURE — 700102 HCHG RX REV CODE 250 W/ 637 OVERRIDE(OP): Performed by: INTERNAL MEDICINE

## 2017-05-13 PROCEDURE — 700111 HCHG RX REV CODE 636 W/ 250 OVERRIDE (IP): Performed by: INTERNAL MEDICINE

## 2017-05-13 PROCEDURE — 700101 HCHG RX REV CODE 250: Performed by: INTERNAL MEDICINE

## 2017-05-13 PROCEDURE — 700102 HCHG RX REV CODE 250 W/ 637 OVERRIDE(OP): Performed by: FAMILY MEDICINE

## 2017-05-13 RX ORDER — OSELTAMIVIR PHOSPHATE 75 MG/1
75 CAPSULE ORAL EVERY 12 HOURS
Status: DISCONTINUED | OUTPATIENT
Start: 2017-05-13 | End: 2017-05-15 | Stop reason: HOSPADM

## 2017-05-13 RX ADMIN — SENNOSIDES AND DOCUSATE SODIUM 2 TABLET: 8.6; 5 TABLET ORAL at 09:05

## 2017-05-13 RX ADMIN — IPRATROPIUM BROMIDE AND ALBUTEROL SULFATE 3 ML: .5; 3 SOLUTION RESPIRATORY (INHALATION) at 23:19

## 2017-05-13 RX ADMIN — CEFTRIAXONE 2 G: 2 INJECTION, POWDER, FOR SOLUTION INTRAMUSCULAR; INTRAVENOUS at 09:06

## 2017-05-13 RX ADMIN — SODIUM CHLORIDE 1000 ML: 9 INJECTION, SOLUTION INTRAVENOUS at 07:28

## 2017-05-13 RX ADMIN — IBUPROFEN 400 MG: 200 TABLET, FILM COATED ORAL at 09:19

## 2017-05-13 RX ADMIN — ENOXAPARIN SODIUM 40 MG: 100 INJECTION SUBCUTANEOUS at 09:06

## 2017-05-13 RX ADMIN — IPRATROPIUM BROMIDE AND ALBUTEROL SULFATE 3 ML: .5; 3 SOLUTION RESPIRATORY (INHALATION) at 14:25

## 2017-05-13 RX ADMIN — IPRATROPIUM BROMIDE AND ALBUTEROL SULFATE 3 ML: .5; 3 SOLUTION RESPIRATORY (INHALATION) at 10:34

## 2017-05-13 RX ADMIN — OSELTAMIVIR PHOSPHATE 75 MG: 75 CAPSULE ORAL at 15:24

## 2017-05-13 RX ADMIN — IPRATROPIUM BROMIDE AND ALBUTEROL SULFATE 3 ML: .5; 3 SOLUTION RESPIRATORY (INHALATION) at 06:38

## 2017-05-13 RX ADMIN — DEXTROSE MONOHYDRATE 500 MG: 50 INJECTION, SOLUTION INTRAVENOUS at 10:18

## 2017-05-13 RX ADMIN — SODIUM CHLORIDE: 9 INJECTION, SOLUTION INTRAVENOUS at 09:12

## 2017-05-13 ASSESSMENT — PAIN SCALES - GENERAL
PAINLEVEL_OUTOF10: 0
PAINLEVEL_OUTOF10: 0

## 2017-05-13 NOTE — PROGRESS NOTES
Hospital Medicine Progress Note, Adult, Complex               Author: Olena Morales Date & Time created: 2017  1:12 PM     Interval History:  21YR OLD M WITH PNEUMONIA AND SEPSIS    Review of Systems:  Review of Systems   Unable to perform ROS: acuity of condition       Physical Exam:  Physical Exam   Constitutional: No distress.   HENT:   Head: Normocephalic and atraumatic.   Eyes: Right eye exhibits no discharge. Left eye exhibits no discharge.   Neck: Neck supple.   Cardiovascular: Normal rate.    Pulmonary/Chest: He has wheezes. He has rales.   Abdominal: Soft. There is no rebound and no guarding.   Musculoskeletal: He exhibits no tenderness.   Skin: Skin is warm. He is not diaphoretic.       Labs:  Recent Labs      17   10217   1432  17   1837   ISTATSPEC  Venous  Venous  Venous     Recent Labs      17   1025   BNPBTYPENAT  5     Recent Labs      17   1025  17   0428   SODIUM  137  138   POTASSIUM  4.1  3.8   CHLORIDE  105  108   CO2  23  22   BUN  12  6*   CREATININE  0.81  0.74   MAGNESIUM  1.9   --    PHOSPHORUS  3.4   --    CALCIUM  8.3*  7.9*     Recent Labs      17   1025  17   0428   ALTSGPT  94*  64*   ASTSGOT  37  25   ALKPHOSPHAT  145*  110*   TBILIRUBIN  0.4  0.6   LIPASE  23   --    GLUCOSE  175*  116*     Recent Labs      175  17   1432  17   0428   RBC  4.73   --   4.35*   HEMOGLOBIN  15.5   --   14.3   HEMATOCRIT  43.9   --   42.3   PLATELETCT  236   --   209   PROTHROMBTM  12.7  12.7   --    APTT   --   26.6   --    INR  0.97  0.97   --      Recent Labs      17   1025  17   0428   WBC  5.2  3.1*   NEUTSPOLYS  67.80  27.00*   LYMPHOCYTES  20.90*  56.00*   MONOCYTES  10.30  13.00   EOSINOPHILS  0.00  0.00   BASOPHILS  0.60  0.00   ASTSGOT  37  25   ALTSGPT  94*  64*   ALKPHOSPHAT  145*  110*   TBILIRUBIN  0.4  0.6           Hemodynamics:  Temp (24hrs), Av.8 °C (98.3 °F), Min:36.6 °C (97.9 °F),  Max:37.1 °C (98.7 °F)  Temperature: 37.1 °C (98.7 °F)  Pulse  Av.8  Min: 88  Max: 110Heart Rate (Monitored): 92  Blood Pressure: 105/45 mmHg     Respiratory:    Respiration: 20, Pulse Oximetry: 88 % (found pt without nasal canula, O2 dropped to 85 ), O2 Daily Delivery Respiratory : Room Air with O2 Available (off oxygen x 8 minutes, placed on 2 L nasal cannula)     Given By:: Mouthpiece, Work Of Breathing / Effort: Mild  RUL Breath Sounds: Clear, RML Breath Sounds: Fine Crackles, RLL Breath Sounds: Diminished;Fine Crackles, NIKHIL Breath Sounds: Clear, LLL Breath Sounds: Diminished;Fine Crackles  Fluids:    Intake/Output Summary (Last 24 hours) at 17 1312  Last data filed at 17 0930   Gross per 24 hour   Intake   1975 ml   Output    300 ml   Net   1675 ml        GI/Nutrition:  Orders Placed This Encounter   Procedures   • DIET Clear Liquid     Standing Status: Standing      Number of Occurrences: 1      Standing Expiration Date:      Order Specific Question:  Diet:     Answer:  Full Liquid [11]     Medical Decision Making, by Problem:  Active Hospital Problems    Diagnosis   • Sepsis (CMS-HCC) [A41.9]   • CAP (community acquired pneumonia) [J18.9]   21YR OLD M WITH  PNEUMONIA INDUCED SEPSIS  BETTER  ON ROCEPHIN  ON ZITROMAX  IV FLUID GIVEN  DUONEB    ?OF FLU  WILL CHECK FOR FLU A&B  TAMIFLU      Lacey catheter: No Lacey      DVT Prophylaxis: Enoxaparin (Lovenox)

## 2017-05-13 NOTE — FLOWSHEET NOTE
Nebulizer given via mask.  Patient sllept through treatment.  BS coarse, congested.     05/13/17 0638   Interdisciplinary Plan of Care-Goals (Indications)   Obstructive Ventilatory Defect or Pulmonary Disease without Obvious Obstruction Physical Exam / Hyperinflation / Wheezing (bronchospasm)   RT Assessment of Delivered Medications   Evaluation of Medication Delivery Daily Yes-- Pt /Family has been Instructed in use of Respiratory Medications and Adverse Reactions   SVN Group   #SVN Performed Yes   Given By: Mask   Chest Exam   Work Of Breathing / Effort Mild   Respiration 20   Pulse 93   Breath Sounds   RUL Breath Sounds Coarse Crackles;Expiratory Wheezes   RML Breath Sounds Coarse Crackles   RLL Breath Sounds Coarse Crackles   NIKHIL Breath Sounds Coarse Crackles;Expiratory Wheezes   LLL Breath Sounds Coarse Crackles   Oximetry   #Pulse Oximetry (Single Determination) Yes   Oxygen   Home O2 Use Prior To Admission? No   Pulse Oximetry 97 %   O2 (LPM) 5   O2 Daily Delivery Respiratory  OxyMask

## 2017-05-13 NOTE — FLOWSHEET NOTE
05/12/17 2026   Interdisciplinary Plan of Care-Goals (Indications)   Obstructive Ventilatory Defect or Pulmonary Disease without Obvious Obstruction Physical Exam / Hyperinflation / Wheezing (bronchospasm)   Interdisciplinary Plan of Care-Outcomes    Bronchodilator Outcome Patient at Stable Baseline   Education   Education Yes - Pt. / Family has been Instructed in use of Respiratory Equipment   RT Assessment of Delivered Medications   Evaluation of Medication Delivery Daily Yes-- Pt /Family has been Instructed in use of Respiratory Medications and Adverse Reactions   SVN Group   #SVN Performed Yes   Given By: Mask   Respiratory WDL   Respiratory (WDL) X   Chest Exam   Respiration 20   Heart Rate (Monitored) 93   Breath Sounds   RUL Breath Sounds Crackles   RML Breath Sounds Diminished   RLL Breath Sounds Diminished   NIKHIL Breath Sounds Crackles   LLL Breath Sounds Diminished   Oximetry   #Pulse Oximetry (Single Determination) Yes   Oxygen   Home O2 Use Prior To Admission? No   Pulse Oximetry 93 %   O2 (LPM) 5   O2 Daily Delivery Respiratory  OxyMask   PAtient is wheezing more post treatment, patient agrees that he is feeling better post treatment as well.

## 2017-05-13 NOTE — DISCHARGE PLANNING
Medical Social Work    Referral: Pt discussed at IDT rounds this AM.    Intervention: Per flowsheet, pt has Down's Syndrome and lives with parents and expects to d.c home.  No SS needs identified nor any requests for LINDA Crowder during rounds.      Plan: LINDA available for any assistance with d.c planning.    Care Transition Team Assessment    Information Source  Orientation :  (ZOË-pt non-verbal)  Information Given By:  (mother of patient)  Informant's Name:  (Maria Esther)    Readmission Evaluation  Is this a readmission?: No    Elopement Risk  Legal Hold: No  Ambulatory or Self Mobile in Wheelchair: Yes  Disoriented: Situation-At Risk for Elopement, Place-At Risk for Elopement, Time-At Risk for Elopement  Psychiatric Symptoms: None  History of Wandering: No  Elopement this Admit: No  Vocalizing Wanting to Leave: No  Displays Behaviors, Body Language Wanting to Leave: No-Not at Risk for Elopement  Elopement Risk: Not at Risk for Elopement    Interdisciplinary Discharge Planning  Does Admitting Nurse Feel This Could be a Complex Discharge?: No  Primary Care Physician:  (Dr. Jeffrey)  Lives with - Patient's Self Care Capacity: Parents  Support Systems: Family Member(s)  Housing / Facility: [address]  Do You Take your Prescribed Medications Regularly:  (needs supervision)  Able to Return to Previous ADL's: Yes  Mobility Issues: No  Prior Services: None  Patient Expects to be Discharged to::  (home)  Assistance Needed: Unknown at this Time    Discharge Preparedness  What is your plan after discharge?: Home with help  What are your discharge supports?: Parent         Finances  Prescription Coverage: Yes    Vision / Hearing Impairment  Vision Impairment : Yes  Right Eye Vision: Wears Glasses  Left Eye Vision: Wears Glasses  Hearing Impairment : Yes  Hearing Impairment: Left Ear    Values / Beliefs / Concerns  Values / Beliefs Concerns : No    Advance Directive  Advance Directive?: None    Domestic Abuse  Have you ever been the  victim of abuse or violence?: No  Physical Abuse or Sexual Abuse: No  Verbal Abuse or Emotional Abuse: No  Possible Abuse Reported to:: Not Applicable    Psychological Assessment  History of Substance Abuse: None         Anticipated Discharge Information  Anticipated discharge disposition: Home

## 2017-05-13 NOTE — PROGRESS NOTES
BS report received. Patient in bed, alert , parents at BS. No c/o pain, nausea or sob at this time. POC discussed, verbalized understanding. Bed low and locked. Call light and belonging within reach and demonstrated use of call light. Fall precaution in effect. Hourly rounding in place.

## 2017-05-13 NOTE — CARE PLAN
Problem: Safety  Goal: Will remain free from falls  Intervention: Implement fall precautions  Room/floor clear. Non skid socks on. Proper signs up. Bed alarm on, bed low and locked. Call light and belonging within reach. Hourly rounding in place to make sure needs are met.        Problem: Infection  Goal: Will remain free from infection  Intervention: Implement standard precautions and perform hand washing before and after patient contact  Hand washing every encounter. IV ports scrubbed with alcohol when hanging medicine. Patient watch for s/s of infection. Patient taught to report s/s of infection, verbalized understanding.        Problem: Respiratory:  Goal: Respiratory status will improve  Intervention: Educate and encourage coughing and deep breathing  Encouraged to perform coughing and deep breathing, verbalized understanding, needs reinforcement.

## 2017-05-13 NOTE — FLOWSHEET NOTE
Patient on room air in restroom prior to resp treatment.  No signs of SOB ambulating.  Room air oximetry 90%, switched from mask to 2 L nasal cannula.     05/13/17 1034   SVN Group   #SVN Performed Yes   Given By: Mouthpiece   Chest Exam   Respiration 18   Pulse (!) 108   Breath Sounds   RUL Breath Sounds Clear   RML Breath Sounds Fine Crackles   RLL Breath Sounds Diminished;Fine Crackles   NIKHIL Breath Sounds Clear   LLL Breath Sounds Diminished;Fine Crackles   Oxygen   Pulse Oximetry 90 %   O2 Daily Delivery Respiratory  Room Air with O2 Available  (off oxygen x 8 minutes, placed on 2 L nasal cannula)

## 2017-05-14 LAB
ANION GAP SERPL CALC-SCNC: 9 MMOL/L (ref 0–11.9)
BASOPHILS # BLD AUTO: 0 % (ref 0–1.8)
BASOPHILS # BLD: 0 K/UL (ref 0–0.12)
BUN SERPL-MCNC: 9 MG/DL (ref 8–22)
CALCIUM SERPL-MCNC: 8.7 MG/DL (ref 8.4–10.2)
CHLORIDE SERPL-SCNC: 103 MMOL/L (ref 96–112)
CO2 SERPL-SCNC: 25 MMOL/L (ref 20–33)
CREAT SERPL-MCNC: 0.76 MG/DL (ref 0.5–1.4)
EKG IMPRESSION: NORMAL
EOSINOPHIL # BLD AUTO: 0.09 K/UL (ref 0–0.51)
EOSINOPHIL NFR BLD: 2 % (ref 0–6.9)
ERYTHROCYTE [DISTWIDTH] IN BLOOD BY AUTOMATED COUNT: 47.5 FL (ref 35.9–50)
GFR SERPL CREATININE-BSD FRML MDRD: >60 ML/MIN/1.73 M 2
GLUCOSE SERPL-MCNC: 126 MG/DL (ref 65–99)
HCT VFR BLD AUTO: 39.7 % (ref 42–52)
HGB BLD-MCNC: 13.9 G/DL (ref 14–18)
LYMPHOCYTES # BLD AUTO: 2.29 K/UL (ref 1–4.8)
LYMPHOCYTES NFR BLD: 52 % (ref 22–41)
MANUAL DIFF BLD: NORMAL
MCH RBC QN AUTO: 32.8 PG (ref 27–33)
MCHC RBC AUTO-ENTMCNC: 35 G/DL (ref 33.7–35.3)
MCV RBC AUTO: 93.6 FL (ref 81.4–97.8)
METAMYELOCYTES NFR BLD MANUAL: 1 %
MONOCYTES # BLD AUTO: 0.44 K/UL (ref 0–0.85)
MONOCYTES NFR BLD AUTO: 10 % (ref 0–13.4)
NEUTROPHILS # BLD AUTO: 1.54 K/UL (ref 1.82–7.42)
NEUTROPHILS NFR BLD: 35 % (ref 44–72)
NRBC # BLD AUTO: 0 K/UL
NRBC BLD AUTO-RTO: 0 /100 WBC
PLATELET # BLD AUTO: 251 K/UL (ref 164–446)
PLATELET BLD QL SMEAR: NORMAL
PMV BLD AUTO: 9.3 FL (ref 9–12.9)
POTASSIUM SERPL-SCNC: 3.7 MMOL/L (ref 3.6–5.5)
RBC # BLD AUTO: 4.24 M/UL (ref 4.7–6.1)
RBC BLD AUTO: NORMAL
SODIUM SERPL-SCNC: 137 MMOL/L (ref 135–145)
VARIANT LYMPHS BLD QL SMEAR: NORMAL
WBC # BLD AUTO: 4.4 K/UL (ref 4.8–10.8)

## 2017-05-14 PROCEDURE — 94640 AIRWAY INHALATION TREATMENT: CPT

## 2017-05-14 PROCEDURE — 85027 COMPLETE CBC AUTOMATED: CPT

## 2017-05-14 PROCEDURE — 85007 BL SMEAR W/DIFF WBC COUNT: CPT

## 2017-05-14 PROCEDURE — A9270 NON-COVERED ITEM OR SERVICE: HCPCS | Performed by: INTERNAL MEDICINE

## 2017-05-14 PROCEDURE — 94760 N-INVAS EAR/PLS OXIMETRY 1: CPT

## 2017-05-14 PROCEDURE — 700101 HCHG RX REV CODE 250: Performed by: FAMILY MEDICINE

## 2017-05-14 PROCEDURE — 770020 HCHG ROOM/CARE - TELE (206)

## 2017-05-14 PROCEDURE — 99232 SBSQ HOSP IP/OBS MODERATE 35: CPT | Performed by: FAMILY MEDICINE

## 2017-05-14 PROCEDURE — 700105 HCHG RX REV CODE 258: Performed by: INTERNAL MEDICINE

## 2017-05-14 PROCEDURE — 80048 BASIC METABOLIC PNL TOTAL CA: CPT

## 2017-05-14 PROCEDURE — 700101 HCHG RX REV CODE 250: Performed by: INTERNAL MEDICINE

## 2017-05-14 PROCEDURE — 700102 HCHG RX REV CODE 250 W/ 637 OVERRIDE(OP): Performed by: INTERNAL MEDICINE

## 2017-05-14 PROCEDURE — A9270 NON-COVERED ITEM OR SERVICE: HCPCS | Performed by: FAMILY MEDICINE

## 2017-05-14 PROCEDURE — 700111 HCHG RX REV CODE 636 W/ 250 OVERRIDE (IP): Performed by: INTERNAL MEDICINE

## 2017-05-14 PROCEDURE — 700102 HCHG RX REV CODE 250 W/ 637 OVERRIDE(OP): Performed by: FAMILY MEDICINE

## 2017-05-14 RX ORDER — IPRATROPIUM BROMIDE AND ALBUTEROL SULFATE 2.5; .5 MG/3ML; MG/3ML
3 SOLUTION RESPIRATORY (INHALATION)
Status: DISCONTINUED | OUTPATIENT
Start: 2017-05-14 | End: 2017-05-15 | Stop reason: HOSPADM

## 2017-05-14 RX ORDER — IPRATROPIUM BROMIDE AND ALBUTEROL SULFATE 2.5; .5 MG/3ML; MG/3ML
3 SOLUTION RESPIRATORY (INHALATION)
Status: CANCELLED | OUTPATIENT
Start: 2017-05-14

## 2017-05-14 RX ADMIN — OSELTAMIVIR PHOSPHATE 75 MG: 75 CAPSULE ORAL at 09:42

## 2017-05-14 RX ADMIN — DEXTROSE MONOHYDRATE 500 MG: 50 INJECTION, SOLUTION INTRAVENOUS at 10:39

## 2017-05-14 RX ADMIN — IPRATROPIUM BROMIDE AND ALBUTEROL SULFATE 3 ML: .5; 3 SOLUTION RESPIRATORY (INHALATION) at 10:35

## 2017-05-14 RX ADMIN — OSELTAMIVIR PHOSPHATE 75 MG: 75 CAPSULE ORAL at 20:37

## 2017-05-14 RX ADMIN — IPRATROPIUM BROMIDE AND ALBUTEROL SULFATE 3 ML: .5; 3 SOLUTION RESPIRATORY (INHALATION) at 07:05

## 2017-05-14 RX ADMIN — IPRATROPIUM BROMIDE AND ALBUTEROL SULFATE 3 ML: .5; 3 SOLUTION RESPIRATORY (INHALATION) at 14:30

## 2017-05-14 RX ADMIN — CEFTRIAXONE 2 G: 2 INJECTION, POWDER, FOR SOLUTION INTRAMUSCULAR; INTRAVENOUS at 09:47

## 2017-05-14 RX ADMIN — ACETAMINOPHEN 650 MG: 325 TABLET, FILM COATED ORAL at 09:58

## 2017-05-14 ASSESSMENT — PAIN SCALES - GENERAL
PAINLEVEL_OUTOF10: 0
PAINLEVEL_OUTOF10: ASSUMED PAIN PRESENT

## 2017-05-14 NOTE — PROGRESS NOTES
Administered prescribed meds per MAR.  Assessed pt, discussed POC (w/pt's parents), updated communication board.  Pt sitting in chair, using personal device, no s/s of acute distress, when asked by this RN if having pain pt pointed to his head, administered tylenol (see MAR), respirations even and mild/moderate labored, on 2 L of oxygen via NC, pt's parents at bedside, all lines patent, advised pt and parents to use call light for assistance, safety precautions in place, will CTM.

## 2017-05-14 NOTE — PROGRESS NOTES
Tele Note:    Rhythm: Sinus rhythm  Rate: 80-90s  NH: 0.16  QRS: 0.08  QT: 0.32  Ectopy: None    Primary pt care not given. Interpretation of strip only.

## 2017-05-14 NOTE — PROGRESS NOTES
Pt assessed, did not adm tamaflu as it was given at 1500 first does and is q12 hr.  Family refuses senna as pt has had 2 loose stools today.  Pt pleasant, no distress currently, family at bedside.

## 2017-05-14 NOTE — PROGRESS NOTES
Pt ambulating around nursing floor, accompanied by parents, respirations even and mild labored w/ambulation, on 2 L of oxygen via NC, gait is steady/even/moderate paced, no s/s of acute distress, safety precautions in place, will CTM.

## 2017-05-14 NOTE — FLOWSHEET NOTE
05/13/17 2320   Events/Summary/Plan   Events/Summary/Plan Increased aeration post treatment   Interdisciplinary Plan of Care-Goals (Indications)   Obstructive Ventilatory Defect or Pulmonary Disease without Obvious Obstruction Physical Exam / Hyperinflation / Wheezing (bronchospasm)   Interdisciplinary Plan of Care-Outcomes    Bronchodilator Outcome Improved Vital Signs and Measures of Gas Exchange   Education   Education Yes - Pt. / Family has been Instructed in use of Respiratory Equipment   RT Assessment of Delivered Medications   Evaluation of Medication Delivery Daily Yes-- Pt /Family has been Instructed in use of Respiratory Medications and Adverse Reactions   SVN Group   #SVN Performed Yes   Given By: Mask   Respiratory WDL   Respiratory (WDL) X   Chest Exam   Work Of Breathing / Effort Mild   Respiration 18   Heart Rate (Monitored) 92   Breath Sounds   Pre/Post Intervention Pre Intervention Assessment   RUL Breath Sounds Crackles   RML Breath Sounds Diminished   RLL Breath Sounds Diminished   NIKHIL Breath Sounds Crackles   LLL Breath Sounds Diminished   Oximetry   #Pulse Oximetry (Single Determination) Yes   Oxygen   Home O2 Use Prior To Admission? No   Pulse Oximetry 98 %   O2 (LPM) 3   O2 Daily Delivery Respiratory  Nasal Cannula   Patient is a little wheezy post treatment, but is feeling better vs previous nights, oxygen was titrated down to 2L

## 2017-05-14 NOTE — FLOWSHEET NOTE
Oximetry 86% on RA     05/14/17 1035   SVN Group   #SVN Performed Yes   Given By: Mask   Chest Exam   Respiration 20   Pulse 86   Breath Sounds   RUL Breath Sounds Expiratory Wheezes;Crackles   RML Breath Sounds Expiratory Wheezes;Crackles   RLL Breath Sounds Diminished   NIKHIL Breath Sounds Expiratory Wheezes;Crackles   LLL Breath Sounds Diminished   Oximetry   #Pulse Oximetry (Single Determination) Yes   Oxygen   Home O2 Use Prior To Admission? No   Pulse Oximetry (!) 86 %  (sleeping, O2 had come off, back on 2 L nasal cannula)   O2 (LPM) 0   O2 Daily Delivery Respiratory  Room Air with O2 Available    sleeping this afternoon, 92% on 2 L sleeping.

## 2017-05-14 NOTE — PROGRESS NOTES
Pt's tele summary: sinus rhythm w/no ectopy.      HR 82      MI interval 0.20  QRS complex 0.08  QT interval 0.36

## 2017-05-14 NOTE — FLOWSHEET NOTE
Found patient with his oxygen off to side, sleeping.  Room air oximetry sleeping 88-89%.  Congested cough, breath sounds more coarse when sleeping I have noticed the last couple days.     05/14/17 0705   SVN Group   #SVN Performed Yes   Given By: Mask   Chest Exam   Respiration 16   Pulse 70   Breath Sounds   Pre/Post Intervention (occ'l upper rhonchi and wheeze)   RUL Breath Sounds Coarse Crackles   RML Breath Sounds Coarse Crackles   RLL Breath Sounds Diminished;Crackles   NIKHIL Breath Sounds Coarse Crackles   LLL Breath Sounds Diminished;Crackles   Secretions   Cough Congested;Moist;Non Productive   Oxygen   Pulse Oximetry 88 %  (patient sleeping with O2 off to side)   O2 (LPM) 0   O2 Daily Delivery Respiratory  Room Air with O2 Available

## 2017-05-14 NOTE — PROGRESS NOTES
RT at bedside. Pt jose elias tx well.  Oxygen reduced to 2 L and will monitor.  Per family, no needs at this time.

## 2017-05-14 NOTE — PROGRESS NOTES
Pt's tele summary: sinus tach, w/no ectopies.            WV interval 0.16  QRS complex 0.08  QT interval 0.34      Pt is asymptomatic for sinus tach, will CTM.

## 2017-05-14 NOTE — CARE PLAN
Problem: Knowledge Deficit  Goal: Knowledge of disease process/condition, treatment plan, diagnostic tests, and medications will improve  Outcome: PROGRESSING AS EXPECTED  Discussed POC, tx, and meds w/pt's parents (pt has hx of down syndrome, almost non verbal), pt's parents verbalized understanding of disease process, encouraged pt's parents to ask questions.    Problem: Pain Management  Goal: Pain level will decrease to patient’s comfort goal  Outcome: PROGRESSING AS EXPECTED  Encouraged pt to verbalize pain, discussed pharmacological pain management w/pt's parents (pt has hx of down syndrome, almost nonverbal).

## 2017-05-14 NOTE — PROGRESS NOTES
Received bedside report from NOC nurseJessica.  Discussed POC.  Pt receiving breathing tx, no s/s of acute distress, safety precautions in place, assumed pt care, will CTM.

## 2017-05-14 NOTE — PROGRESS NOTES
Hospital Medicine Progress Note, Adult, Complex               Author: Olena Morales Date & Time created: 5/14/2017  10:10 AM     Interval History:  21YR OLD M WITH PNEUMONIA AND SEPSIS    Review of Systems:  Review of Systems   Unable to perform ROS: acuity of condition       Physical Exam:  Physical Exam   Constitutional: No distress.   HENT:   Head: Normocephalic and atraumatic.   Eyes: Right eye exhibits no discharge. Left eye exhibits no discharge.   Neck: Neck supple. No JVD present.   Cardiovascular: Normal rate and regular rhythm.    Pulmonary/Chest: No stridor. No respiratory distress. He has no wheezes.   MILD CONGESTION   Abdominal: There is no tenderness. There is no rebound and no guarding.   Musculoskeletal: He exhibits no tenderness.   Neurological: He is alert.   Skin: Skin is warm and dry. He is not diaphoretic.       Labs:  Recent Labs      05/12/17   1025  05/12/17   1432  05/12/17   1837   ISTATSPEC  Venous  Venous  Venous     Recent Labs      05/12/17   1025   BNPBTYPENAT  5     Recent Labs      05/12/17   1025  05/13/17   0428  05/14/17   0408   SODIUM  137  138  137   POTASSIUM  4.1  3.8  3.7   CHLORIDE  105  108  103   CO2  23  22  25   BUN  12  6*  9   CREATININE  0.81  0.74  0.76   MAGNESIUM  1.9   --    --    PHOSPHORUS  3.4   --    --    CALCIUM  8.3*  7.9*  8.7     Recent Labs      05/12/17   1025  05/13/17   0428  05/14/17   0408   ALTSGPT  94*  64*   --    ASTSGOT  37  25   --    ALKPHOSPHAT  145*  110*   --    TBILIRUBIN  0.4  0.6   --    LIPASE  23   --    --    GLUCOSE  175*  116*  126*     Recent Labs      05/12/17   1025  05/12/17   1432  05/13/17   0428  05/14/17   0408   RBC  4.73   --   4.35*  4.24*   HEMOGLOBIN  15.5   --   14.3  13.9*   HEMATOCRIT  43.9   --   42.3  39.7*   PLATELETCT  236   --   209  251   PROTHROMBTM  12.7  12.7   --    --    APTT   --   26.6   --    --    INR  0.97  0.97   --    --      Recent Labs      05/12/17   1025  05/13/17   0428  05/14/17    0408   WBC  5.2  3.1*  4.4*   NEUTSPOLYS  67.80  27.00*  35.00*   LYMPHOCYTES  20.90*  56.00*  52.00*   MONOCYTES  10.30  13.00  10.00   EOSINOPHILS  0.00  0.00  2.00   BASOPHILS  0.60  0.00  0.00   ASTSGOT  37  25   --    ALTSGPT  94*  64*   --    ALKPHOSPHAT  145*  110*   --    TBILIRUBIN  0.4  0.6   --            Hemodynamics:  Temp (24hrs), Av.8 °C (98.2 °F), Min:36.5 °C (97.7 °F), Max:37.1 °C (98.7 °F)  Temperature: 36.7 °C (98.1 °F)  Pulse  Av.7  Min: 70  Max: 110Heart Rate (Monitored): 92  Blood Pressure: 117/58 mmHg     Respiratory:    Respiration: (!) 22, Pulse Oximetry: 92 % (walking oximetry ), O2 Daily Delivery Respiratory : Silicone Nasal Cannula     Given By:: Mask, Work Of Breathing / Effort: Mild  RUL Breath Sounds: Coarse Crackles, RML Breath Sounds: Coarse Crackles, RLL Breath Sounds: Diminished;Crackles, NIKHIL Breath Sounds: Coarse Crackles, LLL Breath Sounds: Diminished;Crackles  Fluids:    Intake/Output Summary (Last 24 hours) at 17 1010  Last data filed at 17 0840   Gross per 24 hour   Intake   1080 ml   Output      0 ml   Net   1080 ml        GI/Nutrition:  Orders Placed This Encounter   Procedures   • DIET Clear Liquid     Standing Status: Standing      Number of Occurrences: 1      Standing Expiration Date:      Order Specific Question:  Diet:     Answer:  Low Fiber(GI Soft) [2]     Medical Decision Making, by Problem:  Active Hospital Problems    Diagnosis   • Sepsis (CMS-HCC) [A41.9]   • CAP (community acquired pneumonia) [J18.9]     21YR OLD M WITH PROBABLE  FLU  FLU A&B ARE NEGATIVE BUT THIS COULD BE  FALSE NEGATIVE  WILL CONTINUE WITH TAMIFLU  HAS IMPROVED ALOT       PNEUMONIA INDUCED SEPSIS  BETTER  ON ROCEPHIN  ON ZITROMAX  IV FLUID GIVEN  DUONEB    DOWN SYNDROME  AT BASE LINE      Lacey catheter: No Lacey      DVT Prophylaxis: Enoxaparin (Lovenox)

## 2017-05-15 VITALS
TEMPERATURE: 97.9 F | WEIGHT: 210.76 LBS | HEIGHT: 64 IN | DIASTOLIC BLOOD PRESSURE: 72 MMHG | RESPIRATION RATE: 20 BRPM | OXYGEN SATURATION: 91 % | HEART RATE: 90 BPM | BODY MASS INDEX: 35.98 KG/M2 | SYSTOLIC BLOOD PRESSURE: 119 MMHG

## 2017-05-15 LAB
BACTERIA UR CULT: NORMAL
FLUAV H1 2009 PAND RNA SPEC QL NAA+PROBE: NOT DETECTED
FLUAV H1 RNA SPEC QL NAA+PROBE: NOT DETECTED
FLUAV H3 RNA SPEC QL NAA+PROBE: NOT DETECTED
FLUAV RNA SPEC QL NAA+PROBE: NOT DETECTED
FLUBV RNA SPEC QL NAA+PROBE: NOT DETECTED
HADV DNA SPEC QL NAA+PROBE: NOT DETECTED
HADV DNA SPEC QL NAA+PROBE: NOT DETECTED
HMPV RNA SPEC QL NAA+PROBE: DETECTED
HPIV1 RNA SPEC QL NAA+PROBE: NOT DETECTED
HPIV2 RNA SPEC QL NAA+PROBE: NOT DETECTED
HPIV3 RNA SPEC QL NAA+PROBE: NOT DETECTED
RHINOVIRUS RNA SPEC QL NAA+PROBE: NOT DETECTED
RSV A RNA SPEC QL NAA+PROBE: NOT DETECTED
RSV B RNA SPEC QL NAA+PROBE: NOT DETECTED
SIGNIFICANT IND 70042: NORMAL
SITE SITE: NORMAL
SOURCE SOURCE: NORMAL

## 2017-05-15 PROCEDURE — 700101 HCHG RX REV CODE 250: Performed by: INTERNAL MEDICINE

## 2017-05-15 PROCEDURE — 94640 AIRWAY INHALATION TREATMENT: CPT

## 2017-05-15 PROCEDURE — 94760 N-INVAS EAR/PLS OXIMETRY 1: CPT

## 2017-05-15 PROCEDURE — A9270 NON-COVERED ITEM OR SERVICE: HCPCS | Performed by: FAMILY MEDICINE

## 2017-05-15 PROCEDURE — 700111 HCHG RX REV CODE 636 W/ 250 OVERRIDE (IP): Performed by: INTERNAL MEDICINE

## 2017-05-15 PROCEDURE — 700102 HCHG RX REV CODE 250 W/ 637 OVERRIDE(OP): Performed by: FAMILY MEDICINE

## 2017-05-15 PROCEDURE — 700105 HCHG RX REV CODE 258: Performed by: INTERNAL MEDICINE

## 2017-05-15 PROCEDURE — 99239 HOSP IP/OBS DSCHRG MGMT >30: CPT | Performed by: FAMILY MEDICINE

## 2017-05-15 PROCEDURE — 700101 HCHG RX REV CODE 250: Performed by: FAMILY MEDICINE

## 2017-05-15 RX ORDER — AZITHROMYCIN 500 MG/1
250 TABLET, FILM COATED ORAL DAILY
Qty: 2 TAB | Refills: 0 | Status: SHIPPED | OUTPATIENT
Start: 2017-05-15 | End: 2017-07-11

## 2017-05-15 RX ORDER — OSELTAMIVIR PHOSPHATE 75 MG/1
75 CAPSULE ORAL EVERY 12 HOURS
Qty: 6 CAP | Refills: 0 | Status: SHIPPED | OUTPATIENT
Start: 2017-05-15 | End: 2017-05-23

## 2017-05-15 RX ORDER — IPRATROPIUM BROMIDE AND ALBUTEROL SULFATE 2.5; .5 MG/3ML; MG/3ML
3 SOLUTION RESPIRATORY (INHALATION) EVERY 4 HOURS PRN
Qty: 1 VIAL | Refills: 0 | Status: SHIPPED | OUTPATIENT
Start: 2017-05-15 | End: 2018-08-27

## 2017-05-15 RX ORDER — CEFDINIR 300 MG/1
300 CAPSULE ORAL 2 TIMES DAILY
Qty: 14 CAP | Refills: 0 | Status: SHIPPED | OUTPATIENT
Start: 2017-05-15 | End: 2017-05-23

## 2017-05-15 RX ADMIN — CEFTRIAXONE 2 G: 2 INJECTION, POWDER, FOR SOLUTION INTRAMUSCULAR; INTRAVENOUS at 09:12

## 2017-05-15 RX ADMIN — IPRATROPIUM BROMIDE AND ALBUTEROL SULFATE 3 ML: .5; 3 SOLUTION RESPIRATORY (INHALATION) at 00:04

## 2017-05-15 RX ADMIN — IPRATROPIUM BROMIDE AND ALBUTEROL SULFATE 3 ML: .5; 3 SOLUTION RESPIRATORY (INHALATION) at 10:00

## 2017-05-15 RX ADMIN — IPRATROPIUM BROMIDE AND ALBUTEROL SULFATE 3 ML: .5; 3 SOLUTION RESPIRATORY (INHALATION) at 06:45

## 2017-05-15 RX ADMIN — ENOXAPARIN SODIUM 40 MG: 100 INJECTION SUBCUTANEOUS at 09:12

## 2017-05-15 RX ADMIN — OSELTAMIVIR PHOSPHATE 75 MG: 75 CAPSULE ORAL at 09:12

## 2017-05-15 NOTE — FLOWSHEET NOTE
Room air oximetry okay when awake and walking, but drops when asleep, as documented.  Room air oximetry 84% sleeping, 91% 2 L sleeping.  Patient's mother said he had a sleep study 7 years ago.  We discussed that he may need a follow-up since      05/15/17 1000   SVN Group   #SVN Performed Yes   Given By: Mask  (sleepimg)   Chest Exam   Respiration 20   Pulse 90   Breath Sounds   RUL Breath Sounds Expiratory Wheezes;Crackles   RML Breath Sounds Expiratory Wheezes;Crackles   RLL Breath Sounds Diminished   NIKHIL Breath Sounds Expiratory Wheezes;Crackles   LLL Breath Sounds Diminished   Oximetry   Continuous Oximetry Yes   Oxygen   Pulse Oximetry 91 %  (sleeping)   O2 (LPM) 2   O2 Daily Delivery Respiratory  Silicone Nasal Cannula   he is much bigger now and desaturates sleeping.  Parents have a good understanding of home oxygen and short term-respiratory treatments as they work in the medical field.

## 2017-05-15 NOTE — FACE TO FACE
Face to Face Note  -  Durable Medical Equipment    Olena Morales M.D. - NPI: [id number]  I certify that this patient is under my care and that they had a durable medical equipment(DME)face to face encounter by myself that meets the physician DME face-to-face encounter requirements with this patient on:    Date of encounter:   Patient:                    MRN:                       YOB: 2017  Sander Romero  [id number]  1995     The encounter with the patient was in whole, or in part, for the following medical condition, which is the primary reason for durable medical equipment:  Other - PNEUMONIA WITH HYPOXIA WHILE SLEEPING    I certify that, based on my findings, the following durable medical equipment is medically necessary:  Oxygen.    HOME O2 Saturation Measurements:(Values must be present for Home Oxygen orders)     Room air sat with amb: 91%  With liters of O2: 2, O2 sat at rest with O2: 97%  With Liters of O2: 2, O2 sat with amb with O2 : 95%  Is the patient mobile?: Yes    My Clinical findings support the need for the above equipment due to:  Other - PNEUMONIA WITH HYPOXIA WHILE SLEEPING    Supporting Symptoms:  PNEUMONIA WITH HYPOXIA WHILE SLEEPING

## 2017-05-15 NOTE — PROGRESS NOTES
Assessment completed. AAO, minimally verbal but responds to commands and answers questions appropriately. Family at bedside. Pt states no pain or other complaints. spO2 90% on RA, 93% on 2L. CLIP, will monitor.

## 2017-05-15 NOTE — FLOWSHEET NOTE
05/15/17 0003   Interdisciplinary Plan of Care-Goals (Indications)   Obstructive Ventilatory Defect or Pulmonary Disease without Obvious Obstruction Physical Exam / Hyperinflation / Wheezing (bronchospasm)   Interdisciplinary Plan of Care-Outcomes    Bronchodilator Outcome Improved Vital Signs and Measures of Gas Exchange;Patient at Stable Baseline   Education   Education Yes - Pt. / Family has been Instructed in use of Respiratory Equipment   RT Assessment of Delivered Medications   Evaluation of Medication Delivery Daily Yes-- Pt /Family has been Instructed in use of Respiratory Medications and Adverse Reactions   SVN Group   #SVN Performed Yes   Given By: Mask   Respiratory WDL   Respiratory (WDL) X   Chest Exam   Respiration 20   Heart Rate (Monitored) 86   Breath Sounds   Pre/Post Intervention Pre Intervention Assessment   RUL Breath Sounds Expiratory Wheezes   RML Breath Sounds Expiratory Wheezes   RLL Breath Sounds Diminished   NIKHIL Breath Sounds Expiratory Wheezes   LLL Breath Sounds Diminished   Oximetry   #Pulse Oximetry (Single Determination) Yes   Oxygen   Home O2 Use Prior To Admission? No   Pulse Oximetry 94 %   O2 (LPM) 2   O2 Daily Delivery Respiratory  Silicone Nasal Cannula   Increased aeration with wheezes throughout and crackles

## 2017-05-15 NOTE — PROGRESS NOTES
Gave bedside report to NOC nurseAshley.  Discussed POC.  Pt resting in bed, semi-fowlers, using personal device, pt's parents at bedside, no s/s of acute distress, pt's parents denied (on pt's behalf) any immediate needs at this time, safety precautions in place.

## 2017-05-15 NOTE — PROGRESS NOTES
Bedside report received from Sole LESLIE @ [id number]. Assumed care. POC discussed. Pt resting comfortably in bed. Safety precautions in place. Family at bedside.

## 2017-05-15 NOTE — DISCHARGE INSTRUCTIONS
Discharge Instructions    Discharged to home by car with relative. Discharged via wheelchair, hospital escort: Yes.  Special equipment needed: Not Applicable    Be sure to schedule a follow-up appointment with your primary care doctor or any specialists as instructed.     Discharge Plan:   Diet Plan: Discussed  Activity Level: Discussed  Confirmed Follow up Appointment: Patient to Call and Schedule Appointment  Confirmed Symptoms Management: Discussed  Medication Reconciliation Updated: Yes    I understand that a diet low in cholesterol, fat, and sodium is recommended for good health. Unless I have been given specific instructions below for another diet, I accept this instruction as my diet prescription.   Other diet: Regular diet    Special Instructions: None    · Is patient discharged on Warfarin / Coumadin?   No     · Is patient Post Blood Transfusion?  No    Depression / Suicide Risk    As you are discharged from this [facility] facility, it is important to learn how to keep safe from harming yourself.    Recognize the warning signs:  · Abrupt changes in personality, positive or negative- including increase in energy   · Giving away possessions  · Change in eating patterns- significant weight changes-  positive or negative  · Change in sleeping patterns- unable to sleep or sleeping all the time   · Unwillingness or inability to communicate  · Depression  · Unusual sadness, discouragement and loneliness  · Talk of wanting to die  · Neglect of personal appearance   · Rebelliousness- reckless behavior  · Withdrawal from people/activities they love  · Confusion- inability to concentrate     If you or a loved one observes any of these behaviors or has concerns about self-harm, here's what you can do:  · Talk about it- your feelings and reasons for harming yourself  · Remove any means that you might use to hurt yourself (examples: pills, rope, extension cords, firearm)  · Get professional help from the community  (Mental Health, Substance Abuse, psychological counseling)  · Do not be alone:Call your Safe Contact- someone whom you trust who will be there for you.  · Call your local CRISIS HOTLINE 630-2745 or 516-703-1362  · Call your local Children's Mobile Crisis Response Team [place] (973) 807-1219 or www.[employer]  · Call the toll free National Suicide Prevention Hotlines   · National Suicide Prevention Lifeline 673-367-JBPJ (4088)  · LOFTY Line Network 800-SUICIDE (383-0460)    Pneumonia, Adult  Pneumonia is an infection of the lungs.   CAUSES  Pneumonia may be caused by bacteria or a virus. Usually, the infection is caused by breathing in droplets from an infected person's cough or sneeze.   SYMPTOMS   Symptoms of pneumonia include:  · Cough.  · Fever.  · Chest pain.  · Rapid breathing.  · Shortness of breath.  · Shaking chills.  · Mucus production.  DIAGNOSIS   If you have the common symptoms of pneumonia, often your health care provider will confirm the diagnosis with a chest X-ray. The X-ray will show an abnormality in the lung if you have pneumonia. Other tests may be done on your blood, urine, or mucus (sputum) to find the specific cause of your pneumonia. A blood gas test or pulse oximetry test may be needed to check how well your lungs are working.  TREATMENT   Your treatment will depend on whether your pneumonia is caused by bacteria or a virus.   2. Bacterial pneumonia is treated with antibiotic medicine.  3. Pneumonia that is caused by the influenza virus may be treated with an antiviral medicine.  4. Pneumonia that is caused by a virus other than influenza will not respond to antibiotic medicine. This type of pneumonia will have to run its course.   HOME CARE INSTRUCTIONS   2. Cough suppressants may be used if you are losing too much rest from coughing at night. However, you should try to avoid taking cough suppresants. This is because coughing helps to remove mucus from your  lungs.  3. Sleep in a semi-upright position at night. Try sleeping in a reclining chair, or place a few pillows under your head.  4. Try using a cold steam vaporizer or humidifier in your home or bedroom. This may help loosen your mucus.  5. If you were prescribed an antibiotic medicine, finish all of it even if you start to feel better.  6. If you were prescribed an expectorant, take it as directed by your health care provider. This medicine loosens the mucus so you can cough it up.  7. Take medicines only as directed by your health care provider.  8. Do not smoke. If you are a smoker and continue to smoke, your cough may last several weeks after your pneumonia has cleared.  9. Get rest when you feel tired, or as needed.  PREVENTION  A pneumococcal shot (vaccine) is available to prevent a common bacterial cause of pneumonia. This is usually suggested for:  2. People over 65 years old.  3. People on chemotherapy.  4. People with chronic lung problems, such as bronchitis or emphysema.  5. People with immune system problems.  If you are over 65 years old or have a high risk condition, you may receive the pneumococcal vaccine if you have not received it before. In some countries, a routine influenza vaccine is also recommended. This vaccine can help prevent some cases of pneumonia. You may be offered the influenza vaccine as part of your care.  If you are a smoker, it is time to quit in order to prevent pneumonia in the future. You may receive instructions on how to stop smoking. Your health care provider can provide medicines and counseling to help you quit.  SEEK MEDICAL CARE IF:  2. You have a fever.  3. You cannot control your cough with suppressants at night, and you keep losing sleep.  SEEK IMMEDIATE MEDICAL CARE IF:   · You have worsening shortness of breath.  · You have increased chest pain.  · Your sickness becomes worse, especially if you are an older adult or have a weakened immune system.  · You cough up  blood.  · You have pain that is getting worse or is not controlled with medicines.  · Your symptoms are getting worse rather than better.     This information is not intended to replace advice given to you by your health care provider. Make sure you discuss any questions you have with your health care provider.     Document Released: 12/18/2006 Document Revised: 01/08/2016 Document Reviewed: 04/13/2016  Elsevier Interactive Patient Education ©2016 Elsevier Inc.

## 2017-05-15 NOTE — RESPIRATORY CARE
COPD EDUCATION by COPD CLINICAL EDUCATOR  5/15/2017  at  10:35 AM by Chen Hammond     Patient interviewed by COPD education team. Patient educated on home oxygen use, home oxygen safety and traveling with oxygen.

## 2017-05-15 NOTE — FLOWSHEET NOTE
05/15/17 0645   Events/Summary/Plan   Events/Summary/Plan Pt slept with 2 L oxymask, changed to 2 L nasal cannula   Interdisciplinary Plan of Care-Goals (Indications)   Obstructive Ventilatory Defect or Pulmonary Disease without Obvious Obstruction Physical Exam / Hyperinflation / Wheezing (bronchospasm)   RT Assessment of Delivered Medications   Evaluation of Medication Delivery Daily Yes-- Pt /Family has been Instructed in use of Respiratory Medications and Adverse Reactions   SVN Group   #SVN Performed Yes   Given By: Mouthpiece   Chest Exam   Respiration 20   Pulse 83   Breath Sounds   RUL Breath Sounds Expiratory Wheezes;Crackles   RML Breath Sounds Expiratory Wheezes;Crackles   RLL Breath Sounds Diminished   NIKHIL Breath Sounds Expiratory Wheezes;Crackles   LLL Breath Sounds Diminished   Secretions   Cough Productive   Oxygen   Home O2 Use Prior To Admission? No   Pulse Oximetry 95 %   O2 (LPM) 2   O2 Daily Delivery Respiratory  OxyMask

## 2017-05-15 NOTE — DISCHARGE PLANNING
LINDA met with this patient and his mother at bedside to complete the choice form for the nebulizer.  Sw was informed that they already have the nebulizer at home.  Sw informed Hospitalist.

## 2017-05-15 NOTE — CARE PLAN
Problem: Communication  Goal: The ability to communicate needs accurately and effectively will improve  Outcome: PROGRESSING AS EXPECTED  Pt and family aware of unit routine and call light system. Family at bedside assists staff with communication with pt, as pt is largely non verbal. POC discussed, pt and family verbalize understanding.    Problem: Respiratory:  Goal: Respiratory status will improve  Outcome: PROGRESSING AS EXPECTED  Pt on O2, titrated to maintain spO2>90%. Pt on IV ABX and tamiflu for infection. Pt encouraged to cough and deep breathe, needs some reinforcement.

## 2017-05-15 NOTE — DISCHARGE SUMMARY
DATE OF ADMISSION:  05/12/2017    DATE OF DISCHARGE:  05/15/2017    HISTORY OF PRESENT ILLNESS:  This is a 21-year-old male who came to the   emergency room with complaint of runny nose, nausea, vomiting, cough,   congestion, and fever.  The patient was diagnosed with pneumonia-induced   sepsis and also probable flu.  Admitted to the hospital for further   management.    HOSPITAL PRESENTATION:  During the hospital stay, the patient was started on   Rocephin and also on Zithromax, received IV fluid.  Also, the patient was   started on flu; however, the flu A and B were negative, but still there is a   30% chance of false negative and season of flu runs until the end of May,   decided to start the patient on Tamiflu and the patient has improved   substantially as well.  The patient is seen at the bedside today with her   parents in the room.  He is sitting up, eating well and in no acute distress.    Also, his room air saturation with ambulation was 91%.  The patient also had   a chest x-ray that was done and showed that there was bilateral pulmonary   airspace process.  The patient was diagnoses include atypical pneumonia and   edema.  Microbiology, urine culture is negative, blood cultures have been   negative x2.  _____ also negative.    PHYSICAL EXAMINATION:  VITAL SIGNS:  Temperature of 36.6, pulse of 80, respiratory rate of 18, blood   pressure of 119/72, O2 saturation 95% on 2 liters of oxygen, 91% on room air   ambulating.  GENERAL APPEARANCE:  The patient is awake and alert.  No acute distress,   sitting up in bed and eating.  HEENT:  Neck is supple.  Lips are moist.  CARDIOVASCULAR:  S1, S2, regular.  LUNGS:  Minor rhonchi.  Minor congestion noted, much better than the first he   came in.  ABDOMEN:  Obese, soft, nontender.  EXTREMITIES:  Lower extremities, no edema or rash noted.    LABORATORY DATA:  WBC of 4.4, H and H 13.9 and 39.7, platelets of 251,000.    Sodium is 137, potassium 3.7, chloride 103,  bicarbonate 25, BUN of 9,   creatinine 0.76.  Lactic acid last one was 1.38.    ASSESSMENT AND PLAN:  This is a 21-year-old male with pneumonia-induced   sepsis.  1.  Has improved substantially.  2.  Being discharged home.  3.  Cefdinir 300 mg p.o. b.i.d. for the next 7 days.  4.  Zithromax 250 mg p.o. daily for the next 2 days.    Probable flu.  The patient will be on Tamiflu 75 mg p.o. b.i.d. for the next 3   days.    For acute respiratory failure:  1.  We will give the patient DuoNeb q. 4 hours p.r.n. for shortness of breath   or wheezing.  2.  Has improved substantially.    The patient is to follow up with the primary care physician in 1 week.       ____________________________________     Olena Morales MD    HCALEXANDRA / BRE    DD:  05/15/2017 09:31:27  DT:  05/15/2017 10:05:20    D#:  [id number]  Job#:  [id number]    cc: Primary Care Physician

## 2017-05-15 NOTE — PROGRESS NOTES
No changes in pt status, pt sleeping throughout the night. No s/s of distress.  Tele strip at 1913 shows sinus rhythm w/ HR of 93.  Measurements: .16/.08/.34    Tele Shift Summary:  Rhythm: Sinus rhythm/sinus tachycardia  Rate: 80's-110's  Ectopy: Per MT Ronald, pt had no ectopy.    Telemetry monitoring strips placed in pt chart.

## 2017-05-15 NOTE — FLOWSHEET NOTE
05/15/17 0830   Home O2 Setup Requirements (Complete within 24 hours of anticipated discharge)   Is Home O2 requested? Yes   Room air sat at rest 92%  (84% room air oximetry sleeping)   Room air sat with amb 91%   O2 sat at rest with O2 97%   With liters of O2 2   O2 sat with amb with O2  95%   With Liters of O2 2   Is the patient mobile? Yes     Oximetry 91% on 2 L sleeping, 84% on room air sleeping.

## 2017-05-15 NOTE — PROGRESS NOTES
Bedside report given to Trini LESLIE. POC discussed. Pt resting comfortably in bed. Safety precautions in place.

## 2017-05-16 LAB — L PNEUMO IGG TITR SER IF: NORMAL {TITER}

## 2017-05-17 LAB
BACTERIA BLD CULT: NORMAL
BACTERIA BLD CULT: NORMAL
SIGNIFICANT IND 70042: NORMAL
SIGNIFICANT IND 70042: NORMAL
SITE SITE: NORMAL
SITE SITE: NORMAL
SOURCE SOURCE: NORMAL
SOURCE SOURCE: NORMAL

## 2017-05-23 ENCOUNTER — OFFICE VISIT (OUTPATIENT)
Dept: MEDICAL GROUP | Facility: MEDICAL CENTER | Age: 22
End: 2017-05-23
Payer: COMMERCIAL

## 2017-05-23 VITALS
BODY MASS INDEX: 37.56 KG/M2 | TEMPERATURE: 97.5 F | OXYGEN SATURATION: 94 % | HEIGHT: 63 IN | DIASTOLIC BLOOD PRESSURE: 70 MMHG | SYSTOLIC BLOOD PRESSURE: 108 MMHG | HEART RATE: 86 BPM | WEIGHT: 212 LBS

## 2017-05-23 DIAGNOSIS — Z13.220 SCREENING FOR HYPERLIPIDEMIA: ICD-10-CM

## 2017-05-23 DIAGNOSIS — R09.02 HYPOXIA: ICD-10-CM

## 2017-05-23 DIAGNOSIS — D64.89 OTHER SPECIFIED ANEMIAS: ICD-10-CM

## 2017-05-23 DIAGNOSIS — Z13.21 ENCOUNTER FOR VITAMIN DEFICIENCY SCREENING: ICD-10-CM

## 2017-05-23 DIAGNOSIS — R73.01 IMPAIRED FASTING GLUCOSE: ICD-10-CM

## 2017-05-23 DIAGNOSIS — G44.041 INTRACTABLE CHRONIC PAROXYSMAL HEMICRANIA: ICD-10-CM

## 2017-05-23 DIAGNOSIS — J15.69: ICD-10-CM

## 2017-05-23 DIAGNOSIS — E66.9 OBESITY (BMI 35.0-39.9 WITHOUT COMORBIDITY): ICD-10-CM

## 2017-05-23 PROCEDURE — 99214 OFFICE O/P EST MOD 30 MIN: CPT | Performed by: NURSE PRACTITIONER

## 2017-05-23 RX ORDER — NAPROXEN 500 MG/1
500 TABLET ORAL
Qty: 30 TAB | Refills: 0 | Status: SHIPPED | OUTPATIENT
Start: 2017-05-23 | End: 2018-07-09

## 2017-05-23 NOTE — MR AVS SNAPSHOT
"        Sander Romero   2017 8:15 AM   Office Visit   MRN: [id number]    Department:  [facility]   Dept Phone:  [phone]    Description:  Male : 1995   Provider:  JOSE MIGUEL Slade           Allergies as of 2017     No Known Allergies      You were diagnosed with     Pneumonia due to other aerobic gram-negative bacteria (CMS-formerly [facility])   [2423971]       Obesity (BMI 30-39.9)   [086292]       Obesity (BMI 35.0-39.9 without comorbidity) (formerly [facility])   [536074]       Intractable chronic paroxysmal hemicrania   [815813]       Other specified anemias   [285.8.ICD-9-CM]       Impaired fasting glucose   [790.21.ICD-9-CM]       Hypoxia   [697350]       Screening for hyperlipidemia   [777739]       Encounter for vitamin deficiency screening   [838686]         Vital Signs     Blood Pressure Pulse Temperature Height Weight Body Mass Index    108/70 mmHg 86 36.4 °C (97.5 °F) 1.588 m (5' 2.52\") 96.163 kg (212 lb) 38.13 kg/m2    Oxygen Saturation Smoking Status                94% Never Smoker           Basic Information     Date Of Birth Sex Race Ethnicity Preferred Language    1995 Male  or   Origin (Nigerien,English,Salvadorean,Praful, etc) English      Problem List              ICD-10-CM Priority Class Noted - Resolved    Down syndrome Q90.9   Unknown - Present    Low HDL (under 40) E78.6   Unknown - Present    Vitamin D deficiency E55.9   Unknown - Present    Impaired fasting glucose R73.01   Unknown - Present    Sleep apnea G47.30   Unknown - Present    Undescended testicle Q53.9   Unknown - Present    Hypoxia R09.02   2017 - Present    Dehydration, moderate E86.0   2017 - Present    URI (upper respiratory infection) J06.9   2017 - Present    Sepsis (CMS-formerly [facility]) A41.9   2017 - Present    CAP (community acquired pneumonia) J18.9   2017 - Present    Obesity (BMI 30-39.9) E66.9   2017 - Present    Obesity (BMI 35.0-39.9 without comorbidity) (formerly [facility]) " E66.9   5/23/2017 - Present      Health Maintenance        Date Due Completion Dates    IMM HPV VACCINE (2 of 3 - Male 3 Dose Series) 3/26/2012 1/30/2012    IMM DTaP/Tdap/Td Vaccine (2 - Td) 12/7/2025 12/7/2015            Current Immunizations     HPV Quadrivalent Vaccine (GARDASIL) 1/30/2012    Hepatitis A Vaccine, Ped/Adol 7/3/2002, 1/3/2002    Hepatitis B Vaccine Recombivax (Adol/Adult) 3/22/1996, 1/12/1996, 1995    Influenza Vaccine Quad Inj (Pf) 12/3/2015    Meningococcal Conjugate Vaccine MCV4 (Menactra) 1/30/2012, 5/30/2007    Tdap Vaccine 12/7/2015  9:27 AM    Tuberculin Skin Test 12/15/2016, 10/20/2014    Varicella Vaccine Live 10/15/1999, 8/12/1997      Below and/or attached are the medications your provider expects you to take. Review all of your home medications and newly ordered medications with your provider and/or pharmacist. Follow medication instructions as directed by your provider and/or pharmacist. Please keep your medication list with you and share with your provider. Update the information when medications are discontinued, doses are changed, or new medications (including over-the-counter products) are added; and carry medication information at all times in the event of emergency situations     Allergies:  No Known Allergies          Medications  Valid as of: May 23, 2017 -  8:37 AM    Generic Name Brand Name Tablet Size Instructions for use    Azithromycin (Tab) ZITHROMAX 500 MG Take 0.5 Tabs by mouth every day.        Cholecalciferol   Take 1 Cap by mouth every day.        Ipratropium-Albuterol (Solution) DUONEB 0.5-2.5 (3) MG/3ML 3 mL by Nebulization route every four hours as needed for Shortness of Breath.        Naproxen (Tab) NAPROSYN 500 MG Take 1 Tab by mouth every 24 hours as needed.        .                 Medicines prescribed today were sent to:     [facility] [address] [place] [address], [place] - [address] [place] [address]    Phone: [phone] Fax:  [phone]    Open 24 Hours?: No      Medication refill instructions:       If your prescription bottle indicates you have medication refills left, it is not necessary to call your provider’s office. Please contact your pharmacy and they will refill your medication.    If your prescription bottle indicates you do not have any refills left, you may request refills at any time through one of the following ways: The online [employer] system (except Urgent Care), by calling your provider’s office, or by asking your pharmacy to contact your provider’s office with a refill request. Medication refills are processed only during regular business hours and may not be available until the next business day. Your provider may request additional information or to have a follow-up visit with you prior to refilling your medication.   *Please Note: Medication refills are assigned a new Rx number when refilled electronically. Your pharmacy may indicate that no refills were authorized even though a new prescription for the same medication is available at the pharmacy. Please request the medicine by name with the pharmacy before contacting your provider for a refill.        Your To Do List     Future Labs/Procedures Complete By Expires    CBC WITH DIFFERENTIAL  As directed 5/24/2018    COMP METABOLIC PANEL  As directed 5/24/2018    DX-CHEST-2 VIEWS  As directed 5/23/2018    HEMOGLOBIN A1C  As directed 5/24/2018    LIPID PROFILE  As directed 5/24/2018    MR-BRAIN-W/O  As directed 11/23/2017    VITAMIN D,25 HYDROXY  As directed 5/24/2018    WESTERGREN SED RATE  As directed 5/24/2018      Referral     A referral request has been sent to our patient care coordination department. Please allow 3-5 business days for us to process this request and contact you either by phone or mail. If you do not hear from us by the 5th business day, please call us at [phone].           [employer] Access Code: [email or web address]  Expires: 6/14/2017 10:02  AM    [employer]  A secure, online tool to manage your health information     [employer]’s Avenida® is a secure, online tool that connects you to your personalized health information from the privacy of your home -- day or night - making it very easy for you to manage your healthcare. Once the activation process is completed, you can even access your medical information using the Avenida edy, which is available for free in the [employer] Edy store or Google Play store.     Avenida provides the following levels of access (as shown below):   My Chart Features   [facility] Doctor [facility]  Specialists [facility]  Urgent  Care Non-[facility]  Primary Care  Doctor   Email your healthcare team securely and privately 24/7 X X X    Manage appointments: schedule your next appointment; view details of past/upcoming appointments X      Request prescription refills. X      View recent personal medical records, including lab and immunizations X X X X   View health record, including health history, allergies, medications X X X X   Read reports about your outpatient visits, procedures, consult and ER notes X X X X   See your discharge summary, which is a recap of your hospital and/or ER visit that includes your diagnosis, lab results, and care plan. X X       How to register for Avenida:  1. Go to  [email or web address].  2. Click on the Sign Up Now box, which takes you to the New Member Sign Up page. You will need to provide the following information:  a. Enter your Avenida Access Code exactly as it appears at the top of this page. (You will not need to use this code after you’ve completed the sign-up process. If you do not sign up before the expiration date, you must request a new code.)   b. Enter your date of birth.   c. Enter your home email address.   d. Click Submit, and follow the next screen’s instructions.  3. Create a Avenida ID. This will be your Avenida login ID and cannot be changed, so think of one that is secure and  easy to remember.  4. Create a [employer] password. You can change your password at any time.  5. Enter your Password Reset Question and Answer. This can be used at a later time if you forget your password.   6. Enter your e-mail address. This allows you to receive e-mail notifications when new information is available in Pro[employer]us.  7. Click Sign Up. You can now view your health information.    For assistance activating your Pro[employer]us account, call [phone]

## 2017-05-23 NOTE — PROGRESS NOTES
Subjective:      Sander Romero is a 21 y.o. male who presents with No chief complaint on file.            HPI  Seen in f/u after hosp for pneumonia and porb influenza.  He is still on antibx.  Getting better.  Still coughing with ? Secretions.  No fever.   During dc from hosp they were told to take pt to cardiac.  He has seen Dr Atkins in the past.  Dc note doesn't say why.  Last echo and stress in 1/16 were wnl.  There was discussion with To on poss MALI.  Reviewed lab from hosp. Glucose was high.  + anemia.  Some elevated lft.  Family has noted snoring recently.  No apnea link done recently.    He is having freq headaches, haley in forehead and temples. Occurs when upset or angry.  Also with exercise.  Family noted this before hosp.      Patient Active Problem List    Diagnosis Date Noted   • Obesity (BMI 30-39.9) 05/23/2017   • Obesity (BMI 35.0-39.9 without comorbidity) ([facility]) 05/23/2017   • Hypoxia 05/12/2017   • Dehydration, moderate 05/12/2017   • URI (upper respiratory infection) 05/12/2017   • Sepsis (CMS-[facility]) 05/12/2017   • CAP (community acquired pneumonia) 05/12/2017   • Low HDL (under 40)    • Vitamin D deficiency    • Impaired fasting glucose    • Sleep apnea    • Undescended testicle    • Down syndrome      Current Outpatient Prescriptions   Medication Sig Dispense Refill   • azithromycin (ZITHROMAX) 500 MG tablet Take 0.5 Tabs by mouth every day. 2 Tab 0   • Cholecalciferol (VITAMIN D PO) Take 1 Cap by mouth every day.     • ipratropium-albuterol (DUONEB) 0.5-2.5 (3) MG/3ML nebulizer solution 3 mL by Nebulization route every four hours as needed for Shortness of Breath. (Patient not taking: Reported on 5/23/2017) 1 Vial 0     No current facility-administered medications for this visit.     No Known Allergies    ROS    Review of Systems   Constitutional: Negative.  Negative for fever, chills, weight loss, malaise/fatigue and diaphoresis.   HENT: Negative.  Negative for hearing loss, ear pain,  "nosebleeds,  sore throat, neck pain, tinnitus and ear discharge.    Respiratory: Negative.  Negative for hemoptysis, shortness of breath, wheezing and stridor.    Cardiovascular: Negative.  Negative for chest pain, palpitations, orthopnea, claudication, leg swelling and PND.   Gastrointestinal: denies nausea, vomiting, diarrhea, constipation, heartburn, melena or hematochezia.  Genitourinary: Denies dysuria, hematuria, urinary incontinence, frequency or urgency.           Objective:     /70 mmHg  Pulse 86  Temp(Src) 36.4 °C (97.5 °F)  Ht 1.588 m (5' 2.52\")  Wt 96.163 kg (212 lb)  BMI 38.13 kg/m2  SpO2 94%     Physical Exam      Physical Exam   Vitals reviewed.  Constitutional: oriented to person, place, and time. appears well-developed and well-nourished. No distress.   HENT:  Head: Normocephalic and atraumatic. Right Ear: External ear normal. Left Ear: External ear normal. Nose: Nose normal. Mouth/Throat: Oropharynx is clear and moist. No oropharyngeal exudate.  pretty tm wnl.  Eyes: Right eye exhibits no discharge. Left eye exhibits no discharge. No scleral icterus.  Neck: No JVD present.  Cardiovascular: Normal rate, regular rhythm, normal heart sounds and intact distal pulses.  Exam reveals no gallop and no friction rub.  No murmur heard.  No carotid bruits.   Pulmonary/Chest: Effort normal. No stridor. No respiratory distress. no wheezes.  Scattered RLL crackles. exhibits no tenderness.   Musculoskeletal: Normal range of motion. exhibits no edema. pretty pedal pulses 2+.  Lymphadenopathy: no cervical or supraclavicular adenopathy.   Neurological: alert and oriented to person, place, and time. exhibits normal muscle tone. Coordination normal.   Skin: Skin is warm and dry. no diaphoresis.   Psychiatric: normal mood and affect. behavior is normal.            Assessment/Plan:     1. Pneumonia due to other aerobic gram-negative bacteria (CMS-[facility])  DX-CHEST-2 VIEWS    REFERRAL TO CARDIOLOGY    recovering.  " repeat cxr in 6 weeks. f/u for test and lab review.  refer to cardiac/Dr To per what hosp told family.   2. Intractable chronic paroxysmal hemicrania  MR-BRAIN-W/O    WESTERGREN SED RATE    naproxen (NAPROSYN) 500 MG Tab    ? etiology.  MRI brain w/o.  try naprosyn prn for tx.    3. Other specified anemias  CBC WITH DIFFERENTIAL    + anemia in hosp.  recheck cbc and f/u 6 weeks   4. Impaired fasting glucose  COMP METABOLIC PANEL    HEMOGLOBIN A1C    glucose elvated in hosp.  check cmp, a1c in 6 weeks.  f/u for review   5. Hypoxia  REFERRAL TO CARDIOLOGY    check apnea link when well   6. Screening for hyperlipidemia  LIPID PROFILE   7. Encounter for vitamin deficiency screening  VITAMIN D,25 HYDROXY   8. Obesity (BMI 35.0-39.9 without comorbidity) (HCC)  Patient identified as having weight management issue.  Appropriate orders and counseling given.

## 2017-05-26 ENCOUNTER — APPOINTMENT (OUTPATIENT)
Dept: RADIOLOGY | Facility: MEDICAL CENTER | Age: 22
End: 2017-05-26
Attending: NURSE PRACTITIONER
Payer: COMMERCIAL

## 2017-05-26 DIAGNOSIS — G44.041 INTRACTABLE CHRONIC PAROXYSMAL HEMICRANIA: ICD-10-CM

## 2017-05-26 PROCEDURE — 70551 MRI BRAIN STEM W/O DYE: CPT

## 2017-05-30 ENCOUNTER — TELEPHONE (OUTPATIENT)
Dept: MEDICAL GROUP | Facility: MEDICAL CENTER | Age: 22
End: 2017-05-30

## 2017-05-30 NOTE — TELEPHONE ENCOUNTER
----- Message from Mauro Reese M.D. sent at 5/30/2017  2:12 PM PDT -----  Please notify Kailee's patient that the MRI of the brain is negative.  I will leave this result for her to review.

## 2017-05-31 ENCOUNTER — TELEPHONE (OUTPATIENT)
Dept: MEDICAL GROUP | Facility: MEDICAL CENTER | Age: 22
End: 2017-05-31

## 2017-05-31 NOTE — TELEPHONE ENCOUNTER
Please let pt parents know that the MRI brain doesn't show anything concerning or anything that would be a concern for his headaches.   If the naprosyn is working he can continue to use that.  If not then we can try imitrex or something stronger.

## 2017-07-05 ENCOUNTER — HOSPITAL ENCOUNTER (OUTPATIENT)
Dept: RADIOLOGY | Facility: MEDICAL CENTER | Age: 22
End: 2017-07-05
Attending: NURSE PRACTITIONER
Payer: COMMERCIAL

## 2017-07-05 ENCOUNTER — HOSPITAL ENCOUNTER (OUTPATIENT)
Dept: LAB | Facility: MEDICAL CENTER | Age: 22
End: 2017-07-05
Attending: NURSE PRACTITIONER
Payer: COMMERCIAL

## 2017-07-05 DIAGNOSIS — Z13.21 ENCOUNTER FOR VITAMIN DEFICIENCY SCREENING: ICD-10-CM

## 2017-07-05 DIAGNOSIS — G44.041 INTRACTABLE CHRONIC PAROXYSMAL HEMICRANIA: ICD-10-CM

## 2017-07-05 DIAGNOSIS — J15.69: ICD-10-CM

## 2017-07-05 DIAGNOSIS — D64.89 OTHER SPECIFIED ANEMIAS: ICD-10-CM

## 2017-07-05 DIAGNOSIS — Z13.220 SCREENING FOR HYPERLIPIDEMIA: ICD-10-CM

## 2017-07-05 DIAGNOSIS — R73.01 IMPAIRED FASTING GLUCOSE: ICD-10-CM

## 2017-07-05 LAB
25(OH)D3 SERPL-MCNC: 21 NG/ML (ref 30–100)
ALBUMIN SERPL BCP-MCNC: 3.5 G/DL (ref 3.2–4.9)
ALBUMIN/GLOB SERPL: 1 G/DL
ALP SERPL-CCNC: 121 U/L (ref 30–99)
ALT SERPL-CCNC: 83 U/L (ref 2–50)
ANION GAP SERPL CALC-SCNC: 8 MMOL/L (ref 0–11.9)
ANISOCYTOSIS BLD QL SMEAR: ABNORMAL
AST SERPL-CCNC: 49 U/L (ref 12–45)
BASOPHILS # BLD AUTO: 0.9 % (ref 0–1.8)
BASOPHILS # BLD: 0.05 K/UL (ref 0–0.12)
BILIRUB SERPL-MCNC: 0.6 MG/DL (ref 0.1–1.5)
BUN SERPL-MCNC: 14 MG/DL (ref 8–22)
CALCIUM SERPL-MCNC: 8.4 MG/DL (ref 8.5–10.5)
CHLORIDE SERPL-SCNC: 105 MMOL/L (ref 96–112)
CHOLEST SERPL-MCNC: 127 MG/DL (ref 100–199)
CO2 SERPL-SCNC: 23 MMOL/L (ref 20–33)
CREAT SERPL-MCNC: 0.88 MG/DL (ref 0.5–1.4)
EOSINOPHIL # BLD AUTO: 0.14 K/UL (ref 0–0.51)
EOSINOPHIL NFR BLD: 2.7 % (ref 0–6.9)
ERYTHROCYTE [DISTWIDTH] IN BLOOD BY AUTOMATED COUNT: 50.4 FL (ref 35.9–50)
ERYTHROCYTE [SEDIMENTATION RATE] IN BLOOD BY WESTERGREN METHOD: 14 MM/HOUR (ref 0–15)
EST. AVERAGE GLUCOSE BLD GHB EST-MCNC: 128 MG/DL
GFR SERPL CREATININE-BSD FRML MDRD: >60 ML/MIN/1.73 M 2
GLOBULIN SER CALC-MCNC: 3.5 G/DL (ref 1.9–3.5)
GLUCOSE SERPL-MCNC: 113 MG/DL (ref 65–99)
HBA1C MFR BLD: 6.1 % (ref 0–5.6)
HCT VFR BLD AUTO: 48 % (ref 42–52)
HDLC SERPL-MCNC: 29 MG/DL
HGB BLD-MCNC: 16.4 G/DL (ref 14–18)
LDLC SERPL CALC-MCNC: 64 MG/DL
LYMPHOCYTES # BLD AUTO: 3.31 K/UL (ref 1–4.8)
LYMPHOCYTES NFR BLD: 63.7 % (ref 22–41)
MACROCYTES BLD QL SMEAR: ABNORMAL
MANUAL DIFF BLD: NORMAL
MCH RBC QN AUTO: 32.5 PG (ref 27–33)
MCHC RBC AUTO-ENTMCNC: 34.2 G/DL (ref 33.7–35.3)
MCV RBC AUTO: 95 FL (ref 81.4–97.8)
MONOCYTES # BLD AUTO: 0.32 K/UL (ref 0–0.85)
MONOCYTES NFR BLD AUTO: 6.2 % (ref 0–13.4)
MORPHOLOGY BLD-IMP: NORMAL
NEUTROPHILS # BLD AUTO: 1.38 K/UL (ref 1.82–7.42)
NEUTROPHILS NFR BLD: 26.5 % (ref 44–72)
NRBC # BLD AUTO: 0 K/UL
NRBC BLD AUTO-RTO: 0 /100 WBC
PLATELET # BLD AUTO: 384 K/UL (ref 164–446)
PLATELET BLD QL SMEAR: NORMAL
PMV BLD AUTO: 8.9 FL (ref 9–12.9)
POTASSIUM SERPL-SCNC: 4.1 MMOL/L (ref 3.6–5.5)
PROT SERPL-MCNC: 7 G/DL (ref 6–8.2)
RBC # BLD AUTO: 5.05 M/UL (ref 4.7–6.1)
RBC BLD AUTO: PRESENT
SMUDGE CELLS BLD QL SMEAR: NORMAL
SODIUM SERPL-SCNC: 136 MMOL/L (ref 135–145)
TRIGL SERPL-MCNC: 168 MG/DL (ref 0–149)
WBC # BLD AUTO: 5.2 K/UL (ref 4.8–10.8)

## 2017-07-05 PROCEDURE — 82306 VITAMIN D 25 HYDROXY: CPT

## 2017-07-05 PROCEDURE — 36415 COLL VENOUS BLD VENIPUNCTURE: CPT

## 2017-07-05 PROCEDURE — 85652 RBC SED RATE AUTOMATED: CPT

## 2017-07-05 PROCEDURE — 83036 HEMOGLOBIN GLYCOSYLATED A1C: CPT

## 2017-07-05 PROCEDURE — 80061 LIPID PANEL: CPT

## 2017-07-05 PROCEDURE — 85027 COMPLETE CBC AUTOMATED: CPT

## 2017-07-05 PROCEDURE — 80053 COMPREHEN METABOLIC PANEL: CPT

## 2017-07-05 PROCEDURE — 71020 DX-CHEST-2 VIEWS: CPT

## 2017-07-05 PROCEDURE — 85007 BL SMEAR W/DIFF WBC COUNT: CPT

## 2017-07-07 ENCOUNTER — OFFICE VISIT (OUTPATIENT)
Dept: CARDIOLOGY | Facility: MEDICAL CENTER | Age: 22
End: 2017-07-07
Payer: COMMERCIAL

## 2017-07-07 VITALS
OXYGEN SATURATION: 94 % | BODY MASS INDEX: 37.56 KG/M2 | HEART RATE: 72 BPM | DIASTOLIC BLOOD PRESSURE: 60 MMHG | RESPIRATION RATE: 14 BRPM | SYSTOLIC BLOOD PRESSURE: 104 MMHG | HEIGHT: 63 IN | WEIGHT: 212 LBS

## 2017-07-07 DIAGNOSIS — R07.89 ATYPICAL CHEST PAIN: ICD-10-CM

## 2017-07-07 DIAGNOSIS — Q90.9 DOWN SYNDROME: ICD-10-CM

## 2017-07-07 PROCEDURE — 99214 OFFICE O/P EST MOD 30 MIN: CPT | Performed by: INTERNAL MEDICINE

## 2017-07-07 RX ORDER — CEFDINIR 300 MG/1
CAPSULE ORAL
COMMUNITY
Start: 2017-05-15 | End: 2017-07-11

## 2017-07-07 RX ORDER — OSELTAMIVIR PHOSPHATE 75 MG/1
CAPSULE ORAL
COMMUNITY
Start: 2017-05-15 | End: 2017-07-11

## 2017-07-07 NOTE — MR AVS SNAPSHOT
"Sander Romero   2017 11:30 AM   Office Visit   MRN: [id number]    Department:  [facility]   Dept Phone:  [phone]    Description:  Male : 1995   Provider:  Sam Atkins M.D.           Reason for Visit     Follow-Up Here for a recheck from recent hospitalization and needing to see vascular.       Allergies as of 2017     No Known Allergies      Vital Signs     Blood Pressure Pulse Respirations Height Weight Body Mass Index    104/60 mmHg 72 14 1.588 m (5' 2.52\") 96.163 kg (212 lb) 38.13 kg/m2    Oxygen Saturation Smoking Status                94% Never Smoker           Basic Information     Date Of Birth Sex Race Ethnicity Preferred Language    1995 Male  or   Origin (Turkish,South Korean,Citizen of [place],Citizen of [place], etc) English      Your appointments     2017  9:00 AM   Established Patient with JOSE MIGUEL Slade   [facility])    [address]  [facility] [address] [phone]           You will be receiving a confirmation call a few days before your appointment from our automated call confirmation system.              Problem List              ICD-10-CM Priority Class Noted - Resolved    Down syndrome Q90.9   Unknown - Present    Low HDL (under 40) E78.6   Unknown - Present    Vitamin D deficiency E55.9   Unknown - Present    Impaired fasting glucose R73.01   Unknown - Present    Sleep apnea G47.30   Unknown - Present    Undescended testicle Q53.9   Unknown - Present    Hypoxia R09.02   2017 - Present    Dehydration, moderate E86.0   2017 - Present    URI (upper respiratory infection) J06.9   2017 - Present    Sepsis (CMS-HCC) A41.9   2017 - Present    CAP (community acquired pneumonia) J18.9   2017 - Present    Obesity (BMI 30-39.9) E66.9   2017 - Present    Obesity (BMI 35.0-39.9 without comorbidity) (HCC) E66.9   2017 - Present      Health Maintenance    "    Date Due Completion Dates    IMM HPV VACCINE (2 of 3 - Male 3 Dose Series) 3/26/2012 1/30/2012    IMM INFLUENZA (1) 9/1/2017 12/3/2015    IMM DTaP/Tdap/Td Vaccine (2 - Td) 12/7/2025 12/7/2015            Current Immunizations     HPV Quadrivalent Vaccine (GARDASIL) 1/30/2012    Hepatitis A Vaccine, Ped/Adol 7/3/2002, 1/3/2002    Hepatitis B Vaccine Recombivax (Adol/Adult) 3/22/1996, 1/12/1996, 1995    Influenza Vaccine Quad Inj (Pf) 12/3/2015    Meningococcal Conjugate Vaccine MCV4 (Menactra) 1/30/2012, 5/30/2007    Tdap Vaccine 12/7/2015  9:27 AM    Tuberculin Skin Test 12/15/2016, 10/20/2014    Varicella Vaccine Live 10/15/1999, 8/12/1997      Below and/or attached are the medications your provider expects you to take. Review all of your home medications and newly ordered medications with your provider and/or pharmacist. Follow medication instructions as directed by your provider and/or pharmacist. Please keep your medication list with you and share with your provider. Update the information when medications are discontinued, doses are changed, or new medications (including over-the-counter products) are added; and carry medication information at all times in the event of emergency situations     Allergies:  No Known Allergies          Medications  Valid as of: July 07, 2017 - 11:42 AM    Generic Name Brand Name Tablet Size Instructions for use    Azithromycin (Tab) ZITHROMAX 500 MG Take 0.5 Tabs by mouth every day.        Cefdinir (Cap) OMNICEF 300 MG         Cholecalciferol   Take 1 Cap by mouth every day.        Ipratropium-Albuterol (Solution) DUONEB 0.5-2.5 (3) MG/3ML 3 mL by Nebulization route every four hours as needed for Shortness of Breath.        Naproxen (Tab) NAPROSYN 500 MG Take 1 Tab by mouth every 24 hours as needed.        Oseltamivir Phosphate (Cap) TAMIFLU 75 MG         .                 Medicines prescribed today were sent to:     [facility] [address] [place] [address], [place] - [address]  [address] [address]    Phone: [phone] Fax: [phone]    Open 24 Hours?: No      Medication refill instructions:       If your prescription bottle indicates you have medication refills left, it is not necessary to call your provider’s office. Please contact your pharmacy and they will refill your medication.    If your prescription bottle indicates you do not have any refills left, you may request refills at any time through one of the following ways: The online [employer] system (except Urgent Care), by calling your provider’s office, or by asking your pharmacy to contact your provider’s office with a refill request. Medication refills are processed only during regular business hours and may not be available until the next business day. Your provider may request additional information or to have a follow-up visit with you prior to refilling your medication.   *Please Note: Medication refills are assigned a new Rx number when refilled electronically. Your pharmacy may indicate that no refills were authorized even though a new prescription for the same medication is available at the pharmacy. Please request the medicine by name with the pharmacy before contacting your provider for a refill.           [employer] Access Code: [email or web address]  Expires: 7/30/2017  4:09 AM    [employer]  A secure, online tool to manage your health information     [employer]’s [employer]® is a secure, online tool that connects you to your personalized health information from the privacy of your home -- day or night - making it very easy for you to manage your healthcare. Once the activation process is completed, you can even access your medical information using the [employer] edy, which is available for free in the Apple Edy store or Google Play store.     [employer] provides the following levels of access (as shown below):   My Chart Features   [facility] Primary Care Doctor [facility]  Specialists [facility]  Urgent  Care  [facility]  Primary Care  Doctor   Email your healthcare team securely and privately 24/7 X X X    Manage appointments: schedule your next appointment; view details of past/upcoming appointments X      Request prescription refills. X      View recent personal medical records, including lab and immunizations X X X X   View health record, including health history, allergies, medications X X X X   Read reports about your outpatient visits, procedures, consult and ER notes X X X X   See your discharge summary, which is a recap of your hospital and/or ER visit that includes your diagnosis, lab results, and care plan. X X       How to register for [employer]:  1. Go to  [email or web address].  2. Click on the Sign Up Now box, which takes you to the New Member Sign Up page. You will need to provide the following information:  a. Enter your [employer] Access Code exactly as it appears at the top of this page. (You will not need to use this code after you’ve completed the sign-up process. If you do not sign up before the expiration date, you must request a new code.)   b. Enter your date of birth.   c. Enter your home email address.   d. Click Submit, and follow the next screen’s instructions.  3. Create a [employer] ID. This will be your [employer] login ID and cannot be changed, so think of one that is secure and easy to remember.  4. Create a [employer] password. You can change your password at any time.  5. Enter your Password Reset Question and Answer. This can be used at a later time if you forget your password.   6. Enter your e-mail address. This allows you to receive e-mail notifications when new information is available in [employer].  7. Click Sign Up. You can now view your health information.    For assistance activating your [employer] account, call [phone]

## 2017-07-07 NOTE — Clinical Note
[facility]   [address]  [place] [address]  Phone: [phone]  Fax: [phone]              Sander Romero  1995    Encounter Date: 7/7/2017    Sam Atkins M.D.          PROGRESS NOTE:  Subjective:   Sander Romero is a 21 y.o. male who presents today for cardiac care and results of his recent cardiac testings. Both transthoracic echocardiogram and nuclear stress test came back negative for significant abnormalities.    No change in clinical status except for he was in the hospital because of pneumonia and sepsis recently.    Past Medical History   Diagnosis Date   • Down syndrome      trisomy 21   • Impaired fasting glucose    • Vitamin D deficiency    • Low HDL (under 40)    • Sleep apnea      work up done as a child.  no tx   • Undescended testicle      Past Surgical History   Procedure Laterality Date   • Other       surgery for undescended testies.   • Tonsillectomy and adenoidectomy     • Testicle exploration       undescended testicle   • Myringotomy       Family History   Problem Relation Age of Onset   • Heart Disease Mother      congenital heart defect   • Diabetes Maternal Grandfather    • Diabetes Paternal Grandmother    • Diabetes Paternal Grandfather      History   Smoking status   • Never Smoker    Smokeless tobacco   • Never Used     No Known Allergies  Outpatient Encounter Prescriptions as of 7/7/2017   Medication Sig Dispense Refill   • naproxen (NAPROSYN) 500 MG Tab Take 1 Tab by mouth every 24 hours as needed. 30 Tab 0   • Cholecalciferol (VITAMIN D PO) Take 1 Cap by mouth every day.     • cefdinir (OMNICEF) 300 MG Cap      • oseltamivir (TAMIFLU) 75 MG Cap      • ipratropium-albuterol (DUONEB) 0.5-2.5 (3) MG/3ML nebulizer solution 3 mL by Nebulization route every four hours as needed for Shortness of Breath. (Patient not taking: Reported on 5/23/2017) 1 Vial 0   • azithromycin (ZITHROMAX) 500 MG tablet  "Take 0.5 Tabs by mouth every day. (Patient not taking: Reported on 7/7/2017) 2 Tab 0     No facility-administered encounter medications on file as of 7/7/2017.     Review of Systems   Unable to perform ROS: mental acuity        Objective:   /60 mmHg  Pulse 72  Resp 14  Ht 1.588 m (5' 2.52\")  Wt 96.163 kg (212 lb)  BMI 38.13 kg/m2  SpO2 94%    Physical Exam   Constitutional: He is oriented to person, place, and time. No distress.   HENT:   Head: Normocephalic and atraumatic.   Eyes: EOM are normal.   Neck: Normal range of motion. No JVD present.   Cardiovascular: Normal rate, regular rhythm, normal heart sounds and intact distal pulses.  Exam reveals no gallop and no friction rub.    No murmur heard.  Bilateral femoral pulses are 2+, bilateral dorsalis pedis pulses are 2+, bilateral posterior tibialis pulses are 2+.   Pulmonary/Chest: No respiratory distress. He has no wheezes. He has no rales. He exhibits no tenderness.   Abdominal: Soft. Bowel sounds are normal. There is no tenderness. There is no rebound and no guarding.   The is no presence of abdominal bruits   Musculoskeletal: Normal range of motion. He exhibits no edema or tenderness.   Neurological: He is alert and oriented to person, place, and time.   Skin: Skin is warm and dry.   Psychiatric: He has a normal mood and affect.   Nursing note and vitals reviewed.      Assessment:     1. Atypical chest pain     2. Down syndrome         Medical Decision Making:  Today's Assessment / Status / Plan:     At this time patient is clinically stable in terms of his cardiac standpoint.  LDL is within goal.   Renal function and Liver function are adequate.  Blood pressure is well controlled.  No further cardiac testing.    I will see patient back in clinic with lab tests and studies results in 24 months.    I thank you Dr. Jeffrey for referring patient to our Cardiology Clinic today.        Kailee Jeffrey, A.P.N.  [address]  [address]  [place] " [id number]  VIA In Basket

## 2017-07-07 NOTE — PROGRESS NOTES
Subjective:   Sander Romero is a 21 y.o. male who presents today for cardiac care and results of his recent cardiac testings. Both transthoracic echocardiogram and nuclear stress test came back negative for significant abnormalities.    No change in clinical status except for he was in the hospital because of pneumonia and sepsis recently.    Past Medical History   Diagnosis Date   • Down syndrome      trisomy 21   • Impaired fasting glucose    • Vitamin D deficiency    • Low HDL (under 40)    • Sleep apnea      work up done as a child.  no tx   • Undescended testicle      Past Surgical History   Procedure Laterality Date   • Other       surgery for undescended testies.   • Tonsillectomy and adenoidectomy     • Testicle exploration       undescended testicle   • Myringotomy       Family History   Problem Relation Age of Onset   • Heart Disease Mother      congenital heart defect   • Diabetes Maternal Grandfather    • Diabetes Paternal Grandmother    • Diabetes Paternal Grandfather      History   Smoking status   • Never Smoker    Smokeless tobacco   • Never Used     No Known Allergies  Outpatient Encounter Prescriptions as of 7/7/2017   Medication Sig Dispense Refill   • naproxen (NAPROSYN) 500 MG Tab Take 1 Tab by mouth every 24 hours as needed. 30 Tab 0   • Cholecalciferol (VITAMIN D PO) Take 1 Cap by mouth every day.     • cefdinir (OMNICEF) 300 MG Cap      • oseltamivir (TAMIFLU) 75 MG Cap      • ipratropium-albuterol (DUONEB) 0.5-2.5 (3) MG/3ML nebulizer solution 3 mL by Nebulization route every four hours as needed for Shortness of Breath. (Patient not taking: Reported on 5/23/2017) 1 Vial 0   • azithromycin (ZITHROMAX) 500 MG tablet Take 0.5 Tabs by mouth every day. (Patient not taking: Reported on 7/7/2017) 2 Tab 0     No facility-administered encounter medications on file as of 7/7/2017.     Review of Systems   Unable to perform ROS: mental acuity        Objective:   /60 mmHg  Pulse 72   "Resp 14  Ht 1.588 m (5' 2.52\")  Wt 96.163 kg (212 lb)  BMI 38.13 kg/m2  SpO2 94%    Physical Exam   Constitutional: He is oriented to person, place, and time. No distress.   HENT:   Head: Normocephalic and atraumatic.   Eyes: EOM are normal.   Neck: Normal range of motion. No JVD present.   Cardiovascular: Normal rate, regular rhythm, normal heart sounds and intact distal pulses.  Exam reveals no gallop and no friction rub.    No murmur heard.  Bilateral femoral pulses are 2+, bilateral dorsalis pedis pulses are 2+, bilateral posterior tibialis pulses are 2+.   Pulmonary/Chest: No respiratory distress. He has no wheezes. He has no rales. He exhibits no tenderness.   Abdominal: Soft. Bowel sounds are normal. There is no tenderness. There is no rebound and no guarding.   The is no presence of abdominal bruits   Musculoskeletal: Normal range of motion. He exhibits no edema or tenderness.   Neurological: He is alert and oriented to person, place, and time.   Skin: Skin is warm and dry.   Psychiatric: He has a normal mood and affect.   Nursing note and vitals reviewed.      Assessment:     1. Atypical chest pain     2. Down syndrome         Medical Decision Making:  Today's Assessment / Status / Plan:     At this time patient is clinically stable in terms of his cardiac standpoint.  LDL is within goal.   Renal function and Liver function are adequate.  Blood pressure is well controlled.  No further cardiac testing.    I will see patient back in clinic with lab tests and studies results in 24 months.    I thank you Dr. Jeffrey for referring patient to our Cardiology Clinic today.    "

## 2017-07-09 ENCOUNTER — TELEPHONE (OUTPATIENT)
Dept: MEDICAL GROUP | Facility: MEDICAL CENTER | Age: 22
End: 2017-07-09

## 2017-07-10 NOTE — TELEPHONE ENCOUNTER
Please let pt parents know that the cxr shows some clearing but not all resolved.  radiology recommends continue f/u.  I recommend we proceed with a ct chest to look further in about a month.   Let me know if they are ok with thatand i will order.

## 2017-07-11 ENCOUNTER — OFFICE VISIT (OUTPATIENT)
Dept: MEDICAL GROUP | Facility: MEDICAL CENTER | Age: 22
End: 2017-07-11
Payer: COMMERCIAL

## 2017-07-11 VITALS
SYSTOLIC BLOOD PRESSURE: 100 MMHG | WEIGHT: 212 LBS | TEMPERATURE: 98.9 F | DIASTOLIC BLOOD PRESSURE: 60 MMHG | HEIGHT: 63 IN | HEART RATE: 84 BPM | OXYGEN SATURATION: 96 % | BODY MASS INDEX: 37.56 KG/M2

## 2017-07-11 DIAGNOSIS — R19.7 DIARRHEA, UNSPECIFIED TYPE: ICD-10-CM

## 2017-07-11 DIAGNOSIS — R73.01 IMPAIRED FASTING GLUCOSE: ICD-10-CM

## 2017-07-11 DIAGNOSIS — R79.89 ELEVATED LFTS: ICD-10-CM

## 2017-07-11 DIAGNOSIS — J11.00 PNEUMONIA AND INFLUENZA: ICD-10-CM

## 2017-07-11 DIAGNOSIS — R35.0 URINARY FREQUENCY: ICD-10-CM

## 2017-07-11 DIAGNOSIS — E55.9 VITAMIN D DEFICIENCY: ICD-10-CM

## 2017-07-11 LAB
APPEARANCE UR: CLEAR
BILIRUB UR STRIP-MCNC: NORMAL MG/DL
COLOR UR AUTO: YELLOW
GLUCOSE BLD-MCNC: 110 MG/DL (ref 70–100)
GLUCOSE UR STRIP.AUTO-MCNC: NORMAL MG/DL
KETONES UR STRIP.AUTO-MCNC: NORMAL MG/DL
LEUKOCYTE ESTERASE UR QL STRIP.AUTO: NORMAL
NITRITE UR QL STRIP.AUTO: NORMAL
PH UR STRIP.AUTO: 6 [PH] (ref 5–8)
PROT UR QL STRIP: NORMAL MG/DL
RBC UR QL AUTO: NORMAL
SP GR UR STRIP.AUTO: 1.01
UROBILINOGEN UR STRIP-MCNC: NORMAL MG/DL

## 2017-07-11 PROCEDURE — 99214 OFFICE O/P EST MOD 30 MIN: CPT | Performed by: NURSE PRACTITIONER

## 2017-07-11 PROCEDURE — 82962 GLUCOSE BLOOD TEST: CPT | Performed by: NURSE PRACTITIONER

## 2017-07-11 PROCEDURE — 81002 URINALYSIS NONAUTO W/O SCOPE: CPT | Performed by: NURSE PRACTITIONER

## 2017-07-11 RX ORDER — M-VIT,TX,IRON,MINS/CALC/FOLIC 27MG-0.4MG
1 TABLET ORAL DAILY
Qty: 30 TAB | Refills: 0
Start: 2017-07-11

## 2017-07-11 ASSESSMENT — PATIENT HEALTH QUESTIONNAIRE - PHQ9: CLINICAL INTERPRETATION OF PHQ2 SCORE: 2

## 2017-07-11 NOTE — PROGRESS NOTES
Subjective:      Sander Romero is a 21 y.o. male who presents with Results            HPI  Seen in f/u for atelectasis.  Pt has downs syndrome.  Seen with his family.  His cxr shows some resolution of the pneumonia and atelectasis but not complete.  Further f/u recommended.  He is not deep breathing.  Still having chest pain with exertion.  They saw Dr Atkins.  He wasn't concerned about the chest jpain but family wasn't impressed with him  He is having urgent diarrhea.  Occurs after every meal and during nite.  Started at [facility].   He is also having inc urine output.  This is unusual for him.    reivewed lab with pt and family.  His vitamin d is 21.  He is on 2000 units d3 daily.    CBC shows anemia is resolved.  Some wbc changes.    CMP shows glucose 113.  a1c 6.1.  Was 6.2 in 2015.  Ca++.   Still has elevated lft.  They are worse than last time.    GFR, ESR is wnl.  LP shows trg are elevated and HDL is low.  n t on meds.    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 m ore coyne and not following directions.      Patient Active Problem List    Diagnosis Date Noted   • Obesity (BMI 30-39.9) 05/23/2017   • Obesity (BMI 35.0-39.9 without comorbidity) ([facility]) 05/23/2017   • Hypoxia 05/12/2017   • Dehydration, moderate 05/12/2017   • URI (upper respiratory infection) 05/12/2017   • Sepsis (CMS-[facility]) 05/12/2017   • CAP (community acquired pneumonia) 05/12/2017   • Low HDL (under 40)    • Vitamin D deficiency    • Impaired fasting glucose    • Sleep apnea    • Undescended testicle    • Down syndrome      Current Outpatient Prescriptions   Medication Sig Dispense Refill   • naproxen (NAPROSYN) 500 MG Tab Take 1 Tab by mouth every 24 hours as needed. 30 Tab 0   • ipratropium-albuterol (DUONEB) 0.5-2.5 (3) MG/3ML nebulizer solution 3 mL by Nebulization route every four hours as needed for Shortness of Breath. (Patient not taking: Reported on 5/23/2017) 1 Vial 0   • Cholecalciferol (VITAMIN D PO) Take 1 Cap by mouth every  "day.       No current facility-administered medications for this visit.     No Known Allergies      ROS    Review of Systems   Constitutional: Negative.  Negative for fever, chills, weight loss, malaise/fatigue and diaphoresis.   HENT: Negative.  Negative for hearing loss, ear pain, nosebleeds, congestion, sore throat, neck pain, tinnitus and ear discharge.    Respiratory: Negative.  Negative for cough, hemoptysis, sputum production, shortness of breath, wheezing and stridor.    Cardiovascular: Negative.  Negative for chest pain, palpitations, orthopnea, claudication, leg swelling and PND.   Gastrointestinal: denies nausea, vomiting, diarrhea, constipation, heartburn, melena or hematochezia.  Genitourinary: Denies dysuria, hematuria, urinary incontinence, frequency or urgency.           Objective:     /60 mmHg  Pulse 84  Temp(Src) 37.2 °C (98.9 °F)  Ht 1.588 m (5' 2.52\")  Wt 96.163 kg (212 lb)  BMI 38.13 kg/m2  SpO2 96%     Physical Exam      Physical Exam   Vitals reviewed.  Constitutional: oriented to person, place, and time. appears well-developed and well-nourished. No distress.   Cardiovascular: Normal rate, regular rhythm, normal heart sounds and intact distal pulses.  Exam reveals no gallop and no friction rub.  No murmur heard.  No carotid bruits.   Pulmonary/Chest: Effort normal and breath sounds normal. No stridor. No respiratory distress. no wheezes or rales. exhibits no tenderness.  lugn sounds diminished but not taking deep breaths.    Musculoskeletal: Normal range of motion. exhibits no edema. pretty pedal pulses 2+.  Lymphadenopathy: no cervical or supraclavicular adenopathy.   Abd:  No CVAT,  Soft,  Bs noted in all quadrants.  No HSM.  No abdominal tenderness.  Neurological: alert and oriented to person, place, and time. exhibits normal muscle tone. Coordination normal.   Skin: Skin is warm and dry. no diaphoresis.   Psychiatric: normal mood and affect. behavior is normal.          "   Assessment/Plan:     1. Pneumonia and influenza      cxr improved but not resolved.  plan recheck with ct chest in 6 weeks.     2. Diarrhea, unspecified type  CRYPTO/GIARDIA RAPID ASSAY    CULTURE STOOL    STOOL WBC'S    CDIFF BY PCR    do stool tests for cx.  f/u with family with results. exp infectious poss with c diff   3. Urinary frequency  POCT Urinalysis    POCT Glucose    Ua IN OFFICE wnl.  glucose 110.  will continue to monitor   4. Elevated LFTs  US-ABDOMEN COMPLETE SURVEY    HEPATITIS PANEL ACUTE(4 COMPONENTS)    FERRITIN    IRON/TOTAL IRON BIND    abd us, check lab and f/u 6 weeks for review   5. Vitamin D deficiency      take 2000 units d3 daily otc.    6. Impaired fasting glucose      stable and dec from 6.2 last time.  improve low complex carb diet.

## 2017-07-11 NOTE — MR AVS SNAPSHOT
"        Sander Romero   2017 9:00 AM   Office Visit   MRN: [id number]    Department:  [facility]   Dept Phone:  [phone]    Description:  Male : 1995   Provider:  JOSE MIGUEL Slade           Reason for Visit     Results           Allergies as of 2017     No Known Allergies      You were diagnosed with     Pneumonia and influenza   [774795]       Diarrhea, unspecified type   [0612771]   do stool tests for cx.      Urinary frequency   [788.41.ICD-9-CM]   Ua IN OFFICE    Elevated LFTs   [023835]         Vital Signs     Blood Pressure Pulse Temperature Height Weight Body Mass Index    100/60 mmHg 84 37.2 °C (98.9 °F) 1.588 m (5' 2.52\") 96.163 kg (212 lb) 38.13 kg/m2    Oxygen Saturation Smoking Status                96% Never Smoker           Basic Information     Date Of Birth Sex Race Ethnicity Preferred Language    1995 Male  or   Origin (Bengali,Ecuadorean,North Korean,Praful, etc) English      Your appointments     Aug 22, 2017  3:30 PM   Established Patient with JOSE MIGUEL Slade   [facility] ([facility])    [address]  [facility] [address] [phone]           You will be receiving a confirmation call a few days before your appointment from our automated call confirmation system.              Problem List              ICD-10-CM Priority Class Noted - Resolved    Down syndrome Q90.9   Unknown - Present    Low HDL (under 40) E78.6   Unknown - Present    Vitamin D deficiency E55.9   Unknown - Present    Impaired fasting glucose R73.01   Unknown - Present    Sleep apnea G47.30   Unknown - Present    Undescended testicle Q53.9   Unknown - Present    Hypoxia R09.02   2017 - Present    Dehydration, moderate E86.0   2017 - Present    URI (upper respiratory infection) J06.9   2017 - Present    Sepsis (CMS-HCC) A41.9   2017 - Present    CAP (community acquired pneumonia) J18.9   2017 " - Present    Obesity (BMI 30-39.9) E66.9   5/23/2017 - Present    Obesity (BMI 35.0-39.9 without comorbidity) (HCC) E66.9   5/23/2017 - Present      Health Maintenance        Date Due Completion Dates    IMM HPV VACCINE (2 of 3 - Male 3 Dose Series) 3/26/2012 1/30/2012    IMM INFLUENZA (1) 9/1/2017 12/3/2015    IMM DTaP/Tdap/Td Vaccine (2 - Td) 12/7/2025 12/7/2015            Current Immunizations     HPV Quadrivalent Vaccine (GARDASIL) 1/30/2012    Hepatitis A Vaccine, Ped/Adol 7/3/2002, 1/3/2002    Hepatitis B Vaccine Recombivax (Adol/Adult) 3/22/1996, 1/12/1996, 1995    Influenza Vaccine Quad Inj (Pf) 12/3/2015    Meningococcal Conjugate Vaccine MCV4 (Menactra) 1/30/2012, 5/30/2007    Tdap Vaccine 12/7/2015  9:27 AM    Tuberculin Skin Test 12/15/2016, 10/20/2014    Varicella Vaccine Live 10/15/1999, 8/12/1997      Below and/or attached are the medications your provider expects you to take. Review all of your home medications and newly ordered medications with your provider and/or pharmacist. Follow medication instructions as directed by your provider and/or pharmacist. Please keep your medication list with you and share with your provider. Update the information when medications are discontinued, doses are changed, or new medications (including over-the-counter products) are added; and carry medication information at all times in the event of emergency situations     Allergies:  No Known Allergies          Medications  Valid as of: July 11, 2017 - 10:02 AM    Generic Name Brand Name Tablet Size Instructions for use    Cholecalciferol   Take 1 Cap by mouth every day.        Ipratropium-Albuterol (Solution) DUONEB 0.5-2.5 (3) MG/3ML 3 mL by Nebulization route every four hours as needed for Shortness of Breath.        Multiple Vitamins-Minerals (Tab) THERAGRAN-M  Take 1 Tab by mouth every day.        Naproxen (Tab) NAPROSYN 500 MG Take 1 Tab by mouth every 24 hours as needed.        .                 Medicines  prescribed today were sent to:     [facility] - [place], [place] - [address]    [address] [facility] [place] [address]    Phone: [phone] Fax: [phone]    Open 24 Hours?: No      Medication refill instructions:       If your prescription bottle indicates you have medication refills left, it is not necessary to call your provider’s office. Please contact your pharmacy and they will refill your medication.    If your prescription bottle indicates you do not have any refills left, you may request refills at any time through one of the following ways: The online [employer] system (except Urgent Care), by calling your provider’s office, or by asking your pharmacy to contact your provider’s office with a refill request. Medication refills are processed only during regular business hours and may not be available until the next business day. Your provider may request additional information or to have a follow-up visit with you prior to refilling your medication.   *Please Note: Medication refills are assigned a new Rx number when refilled electronically. Your pharmacy may indicate that no refills were authorized even though a new prescription for the same medication is available at the pharmacy. Please request the medicine by name with the pharmacy before contacting your provider for a refill.        Your To Do List     Future Labs/Procedures Complete By Expires    CDIFF BY PCR  As directed 7/12/2018    CRYPTO/GIARDIA RAPID ASSAY  As directed 7/12/2018    CULTURE STOOL  As directed 7/12/2018    FERRITIN  As directed 7/12/2018    HEPATITIS PANEL ACUTE(4 COMPONENTS)  As directed 7/12/2018    IRON/TOTAL IRON BIND  As directed 7/12/2018    STOOL WBC'S  As directed 7/12/2018    US-ABDOMEN COMPLETE SURVEY  As directed 7/11/2018         [employer] Access Code: [email or web address]  Expires: 7/30/2017  4:09 AM    [employer]  A secure, online tool to manage your health information     [employer] is a  secure, online tool that connects you to your personalized health information from the privacy of your home -- day or night - making it very easy for you to manage your healthcare. Once the activation process is completed, you can even access your medical information using the [email or web address] edy, which is available for free in the Apple Edy store or Google Play store.     [email or web address] provides the following levels of access (as shown below):   My Chart Features   [facility] Doctor [facility]  Specialists [facility]  Urgent  Care [facility]  Primary Care  Doctor   Email your healthcare team securely and privately 24/7 X X X    Manage appointments: schedule your next appointment; view details of past/upcoming appointments X      Request prescription refills. X      View recent personal medical records, including lab and immunizations X X X X   View health record, including health history, allergies, medications X X X X   Read reports about your outpatient visits, procedures, consult and ER notes X X X X   See your discharge summary, which is a recap of your hospital and/or ER visit that includes your diagnosis, lab results, and care plan. X X       How to register for [email or web address]:  1. Go to  [email or web address].  2. Click on the Sign Up Now box, which takes you to the New Member Sign Up page. You will need to provide the following information:  a. Enter your [email or web address] Access Code exactly as it appears at the top of this page. (You will not need to use this code after you’ve completed the sign-up process. If you do not sign up before the expiration date, you must request a new code.)   b. Enter your date of birth.   c. Enter your home email address.   d. Click Submit, and follow the next screen’s instructions.  3. Create a [employer] ID. This will be your [email or web address] login ID and cannot be changed, so think of one that is secure and easy to remember.  4. Create a BannerView.com password. You can change your password at any time.  5. Enter  your Password Reset Question and Answer. This can be used at a later time if you forget your password.   6. Enter your e-mail address. This allows you to receive e-mail notifications when new information is available in [email or web address].  7. Click Sign Up. You can now view your health information.    For assistance activating your [email or web address] account, call [phone]

## 2017-08-16 ENCOUNTER — HOSPITAL ENCOUNTER (OUTPATIENT)
Facility: MEDICAL CENTER | Age: 22
End: 2017-08-16
Attending: NURSE PRACTITIONER
Payer: COMMERCIAL

## 2017-08-16 DIAGNOSIS — R19.7 DIARRHEA, UNSPECIFIED TYPE: ICD-10-CM

## 2017-08-16 PROCEDURE — 87493 C DIFF AMPLIFIED PROBE: CPT

## 2017-08-16 PROCEDURE — 87324 CLOSTRIDIUM AG IA: CPT

## 2017-08-16 PROCEDURE — 89055 LEUKOCYTE ASSESSMENT FECAL: CPT

## 2017-08-17 ENCOUNTER — TELEPHONE (OUTPATIENT)
Dept: MEDICAL GROUP | Facility: MEDICAL CENTER | Age: 22
End: 2017-08-17

## 2017-08-17 DIAGNOSIS — J15.8 PNEUMONIA DUE TO ANAEROBES (HCC): ICD-10-CM

## 2017-08-18 ENCOUNTER — HOSPITAL ENCOUNTER (OUTPATIENT)
Dept: RADIOLOGY | Facility: MEDICAL CENTER | Age: 22
End: 2017-08-18
Attending: NURSE PRACTITIONER
Payer: COMMERCIAL

## 2017-08-18 DIAGNOSIS — J15.8 PNEUMONIA DUE TO ANAEROBES (HCC): ICD-10-CM

## 2017-08-18 PROCEDURE — 71250 CT THORAX DX C-: CPT

## 2017-08-19 LAB
C DIFF DNA SPEC QL NAA+PROBE: POSITIVE
C DIFF TOX A+B STL QL IA: NEGATIVE
C DIFF TOX GENS STL QL NAA+PROBE: ABNORMAL
WBC STL QL MICRO: NORMAL

## 2017-08-20 ENCOUNTER — TELEPHONE (OUTPATIENT)
Dept: MEDICAL GROUP | Facility: MEDICAL CENTER | Age: 22
End: 2017-08-20

## 2017-08-20 DIAGNOSIS — R93.89 ABNORMAL CT SCAN, CHEST: ICD-10-CM

## 2017-08-20 DIAGNOSIS — J15.69: ICD-10-CM

## 2017-08-21 ENCOUNTER — TELEPHONE (OUTPATIENT)
Dept: MEDICAL GROUP | Facility: MEDICAL CENTER | Age: 22
End: 2017-08-21

## 2017-08-21 ENCOUNTER — HOSPITAL ENCOUNTER (OUTPATIENT)
Dept: LAB | Facility: MEDICAL CENTER | Age: 22
End: 2017-08-21
Attending: NURSE PRACTITIONER
Payer: COMMERCIAL

## 2017-08-21 DIAGNOSIS — R79.89 ELEVATED LFTS: ICD-10-CM

## 2017-08-21 LAB
C DIFF DNA SPEC QL NAA+PROBE: NEGATIVE
C DIFF TOX GENS STL QL NAA+PROBE: NEGATIVE
FERRITIN SERPL-MCNC: 107 NG/ML (ref 22–322)
G LAMBLIA+C PARVUM AG STL QL RAPID: NORMAL
HAV IGM SERPL QL IA: NEGATIVE
HBV CORE IGM SER QL: NEGATIVE
HBV SURFACE AG SER QL: NEGATIVE
HCV AB SER QL: NEGATIVE
IRON SATN MFR SERPL: 20 % (ref 15–55)
IRON SERPL-MCNC: 52 UG/DL (ref 50–180)
SIGNIFICANT IND 70042: NORMAL
SITE SITE: NORMAL
SOURCE SOURCE: NORMAL
TIBC SERPL-MCNC: 266 UG/DL (ref 250–450)
WBC STL QL MICRO: NORMAL

## 2017-08-21 PROCEDURE — 83550 IRON BINDING TEST: CPT

## 2017-08-21 PROCEDURE — 87045 FECES CULTURE AEROBIC BACT: CPT

## 2017-08-21 PROCEDURE — 87328 CRYPTOSPORIDIUM AG IA: CPT

## 2017-08-21 PROCEDURE — 82728 ASSAY OF FERRITIN: CPT

## 2017-08-21 PROCEDURE — 83540 ASSAY OF IRON: CPT

## 2017-08-21 PROCEDURE — 87493 C DIFF AMPLIFIED PROBE: CPT

## 2017-08-21 PROCEDURE — 87329 GIARDIA AG IA: CPT

## 2017-08-21 PROCEDURE — 36415 COLL VENOUS BLD VENIPUNCTURE: CPT

## 2017-08-21 PROCEDURE — 89055 LEUKOCYTE ASSESSMENT FECAL: CPT

## 2017-08-21 PROCEDURE — 80074 ACUTE HEPATITIS PANEL: CPT

## 2017-08-21 PROCEDURE — 87046 STOOL CULTR AEROBIC BACT EA: CPT

## 2017-08-21 RX ORDER — METRONIDAZOLE 500 MG/1
500 TABLET ORAL 3 TIMES DAILY
Qty: 30 TAB | Refills: 0 | Status: SHIPPED | OUTPATIENT
Start: 2017-08-21 | End: 2018-07-09

## 2017-08-21 NOTE — TELEPHONE ENCOUNTER
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 Needs to have tx to see if sx improve.  I will send in antibx.  Also imp to be on a probiotic. F/u if sx not improved with tx.

## 2017-08-21 NOTE — TELEPHONE ENCOUNTER
Please let pt family know that the ct chest shows some chronic changes.  Would like to refer to pulm.  Let me know if they are ok with it.

## 2017-08-21 NOTE — TELEPHONE ENCOUNTER
1. Caller Name: Lab                    Call Back Number: N/A    2. Message: Lab called for positive result on the C Diff.     3. Patient approves office to leave a detailed voicemail/MyChart message: N\A

## 2017-08-22 ENCOUNTER — OFFICE VISIT (OUTPATIENT)
Dept: MEDICAL GROUP | Facility: MEDICAL CENTER | Age: 22
End: 2017-08-22
Payer: COMMERCIAL

## 2017-08-22 VITALS
HEIGHT: 63 IN | HEART RATE: 80 BPM | SYSTOLIC BLOOD PRESSURE: 100 MMHG | TEMPERATURE: 97.6 F | DIASTOLIC BLOOD PRESSURE: 62 MMHG | OXYGEN SATURATION: 96 % | BODY MASS INDEX: 37.39 KG/M2 | WEIGHT: 211 LBS

## 2017-08-22 DIAGNOSIS — R91.8 GROUND GLASS OPACITY PRESENT ON IMAGING OF LUNG: ICD-10-CM

## 2017-08-22 DIAGNOSIS — R19.7 DIARRHEA, UNSPECIFIED TYPE: ICD-10-CM

## 2017-08-22 DIAGNOSIS — K76.0 FATTY LIVER: ICD-10-CM

## 2017-08-22 DIAGNOSIS — R79.89 ELEVATED LFTS: ICD-10-CM

## 2017-08-22 PROCEDURE — 99214 OFFICE O/P EST MOD 30 MIN: CPT | Performed by: NURSE PRACTITIONER

## 2017-08-22 NOTE — PROGRESS NOTES
Subjective:      Sander Romero is a 21 y.o. male who presents with No chief complaint on file.            HPI  Seen in f/u for diarrhea and abn ct chest after pneumonia.  Pt has downs syndrome.  He is seen with parents today.  Pt is still having diarrhea sometimes with accidents.  They had to do multiple stool samples since lab kept loosing it.  All stool tests are neg except monday there was a toxin +.  Today on redo lab it was all neg. Not on tx.  He is having abd pain.    Reviewed lab with pt and parents with confusing c diff results.  All else w nl with hep panel, fe/tibc, ferritin, stool, cx and wbc.  Reviewed ct chest with pt and family.  Has fatty liver.  Has ground glass opacities.  Pt has alway slept sitting up and being over on abd.  WILL get SOB with lying flat.  Echo several years ago did  Patient Active Problem List    Diagnosis Date Noted   • Obesity (BMI 30-39.9) 05/23/2017   • Obesity (BMI 35.0-39.9 without comorbidity) ([facility]) 05/23/2017   • Hypoxia 05/12/2017   • Dehydration, moderate 05/12/2017   • URI (upper respiratory infection) 05/12/2017   • Sepsis (CMS-[facility]) 05/12/2017   • CAP (community acquired pneumonia) 05/12/2017   • Low HDL (under 40)    • Vitamin D deficiency    • Impaired fasting glucose    • Sleep apnea    • Undescended testicle    • Down syndrome      Current Outpatient Prescriptions   Medication Sig Dispense Refill   • metronidazole (FLAGYL) 500 MG Tab Take 1 Tab by mouth 3 times a day. 30 Tab 0   • therapeutic multivitamin-minerals (THERAGRAN-M) Tab Take 1 Tab by mouth every day. 30 Tab 0   • naproxen (NAPROSYN) 500 MG Tab Take 1 Tab by mouth every 24 hours as needed. 30 Tab 0   • ipratropium-albuterol (DUONEB) 0.5-2.5 (3) MG/3ML nebulizer solution 3 mL by Nebulization route every four hours as needed for Shortness of Breath. (Patient not taking: Reported on 5/23/2017) 1 Vial 0   • Cholecalciferol (VITAMIN D PO) Take 1 Cap by mouth every day.       No current  facility-administered medications for this visit.     No Known Allergies    ROS  Review of Systems   Constitutional: Negative.  Negative for fever, chills, weight loss, malaise/fatigue and diaphoresis.   HENT: Negative.  Negative for hearing loss, ear pain, nosebleeds, congestion, sore throat, neck pain, tinnitus and ear discharge.    Respiratory: Negative.  Negative for cough, hemoptysis, sputum production, shortness of breath, wheezing and stridor.    Cardiovascular: Negative.  Negative for chest pain, palpitations, orthopnea, claudication, leg swelling and PND.   Gastrointestinal: denies nausea, vomiting, diarrhea, constipation, heartburn, melena or hematochezia.  Genitourinary: Denies dysuria, hematuria, urinary incontinence, frequency or urgency.         Objective:     There were no vitals taken for this visit.     Physical Exam      Physical Exam   Vitals reviewed.  Constitutional: appears well-developed and well-nourished. No distress.   Cardiovascular: Normal rate, regular rhythm, normal heart sounds and intact distal pulses.  Exam reveals no gallop and no friction rub.  No murmur heard.  No carotid bruits.   Pulmonary/Chest: Effort normal and breath sounds normal. No stridor. No respiratory distress. no wheezes or rales. exhibits no tenderness.   Musculoskeletal: Normal range of motion.   Neurological: alert and oriented to person, place, and time. exhibits normal muscle tone. Coordination normal.   Skin: Skin is warm and dry. no diaphoresis.              Assessment/Plan:     1. Ground glass opacity present on imaging of lung      has been referred to pulm.  no current sx of infection   2. Elevated LFTs  REFERRAL TO GASTROENTEROLOGY    refer GI for eval.  f/u 3 months for sx eval   3. Diarrhea, unspecified type  REFERRAL TO GASTROENTEROLOGY    refer GI   4. Fatty liver

## 2017-08-22 NOTE — MR AVS SNAPSHOT
"Sander Romero   2017 9:00 AM   Office Visit   MRN: [id number]    Department:  [facility]   Dept Phone:  [phone]    Description:  Male : 1995   Provider:  JOSE MIGUEL Slade           Allergies as of 2017     No Known Allergies      You were diagnosed with     Ground glass opacity present on imaging of lung   [3941102]   has been referred to pulm.  no current sx of infection    Elevated LFTs   [[id number]]   refer GI for eval.  f/u 3 months for sx eval    Diarrhea, unspecified type   [[id number]]   refer GI    Fatty liver   [[id number]]         Vital Signs     Blood Pressure Pulse Temperature Height Weight Body Mass Index    100/62 mmHg 80 36.4 °C (97.6 °F) 1.6 m (5' 3\") 95.709 kg (211 lb) 37.39 kg/m2    Oxygen Saturation Smoking Status                96% Never Smoker           Basic Information     Date Of Birth Sex Race Ethnicity Preferred Language    1995 Male White  Origin (Yoruba,Monegasque,Cymro,Equatorial Guinean, etc) English      Problem List              ICD-10-CM Priority Class Noted - Resolved    Down syndrome Q90.9   Unknown - Present    Low HDL (under 40) E78.6   Unknown - Present    Vitamin D deficiency E55.9   Unknown - Present    Impaired fasting glucose R73.01   Unknown - Present    Sleep apnea G47.30   Unknown - Present    Undescended testicle Q53.9   Unknown - Present    Hypoxia R09.02   2017 - Present    Dehydration, moderate E86.0   2017 - Present    URI (upper respiratory infection) J06.9   2017 - Present    Sepsis (CMS-HCC) A41.9   2017 - Present    CAP (community acquired pneumonia) J18.9   2017 - Present    Obesity (BMI 30-39.9) E66.9   2017 - Present    Obesity (BMI 35.0-39.9 without comorbidity) (HCC) E66.9   2017 - Present      Health Maintenance        Date Due Completion Dates    IMM HPV VACCINE (2 of 3 - Male 3 Dose Series) 3/26/2012 2012    IMM INFLUENZA (1) 2017 12/3/2015    IMM DTaP/Tdap/Td " Vaccine (2 - Td) 12/7/2025 12/7/2015            Current Immunizations     HPV Quadrivalent Vaccine (GARDASIL) 1/30/2012    Hepatitis A Vaccine, Ped/Adol 7/3/2002, 1/3/2002    Hepatitis B Vaccine Recombivax (Adol/Adult) 3/22/1996, 1/12/1996, 1995    Influenza Vaccine Quad Inj (Pf) 12/3/2015    Meningococcal Conjugate Vaccine MCV4 (Menactra) 1/30/2012, 5/30/2007    Tdap Vaccine 12/7/2015  9:27 AM    Tuberculin Skin Test 12/15/2016, 10/20/2014    Varicella Vaccine Live 10/15/1999, 8/12/1997      Below and/or attached are the medications your provider expects you to take. Review all of your home medications and newly ordered medications with your provider and/or pharmacist. Follow medication instructions as directed by your provider and/or pharmacist. Please keep your medication list with you and share with your provider. Update the information when medications are discontinued, doses are changed, or new medications (including over-the-counter products) are added; and carry medication information at all times in the event of emergency situations     Allergies:  No Known Allergies          Medications  Valid as of: August 22, 2017 -  9:39 AM    Generic Name Brand Name Tablet Size Instructions for use    Cholecalciferol   Take 1 Cap by mouth every day.        Ipratropium-Albuterol (Solution) DUONEB 0.5-2.5 (3) MG/3ML 3 mL by Nebulization route every four hours as needed for Shortness of Breath.        MetroNIDAZOLE (Tab) FLAGYL 500 MG Take 1 Tab by mouth 3 times a day.        Multiple Vitamins-Minerals (Tab) THERAGRAN-M  Take 1 Tab by mouth every day.        Naproxen (Tab) NAPROSYN 500 MG Take 1 Tab by mouth every 24 hours as needed.        .                 Medicines prescribed today were sent to:     [facility] [address] [place] [address], [place] - [address] [address]    Phone: [phone] Fax: [phone]    Open 24 Hours?: No      Medication refill instructions:       If your  prescription bottle indicates you have medication refills left, it is not necessary to call your provider’s office. Please contact your pharmacy and they will refill your medication.    If your prescription bottle indicates you do not have any refills left, you may request refills at any time through one of the following ways: The online [employer] system (except Urgent Care), by calling your provider’s office, or by asking your pharmacy to contact your provider’s office with a refill request. Medication refills are processed only during regular business hours and may not be available until the next business day. Your provider may request additional information or to have a follow-up visit with you prior to refilling your medication.   *Please Note: Medication refills are assigned a new Rx number when refilled electronically. Your pharmacy may indicate that no refills were authorized even though a new prescription for the same medication is available at the pharmacy. Please request the medicine by name with the pharmacy before contacting your provider for a refill.        Referral     A referral request has been sent to our patient care coordination department. Please allow 3-5 business days for us to process this request and contact you either by phone or mail. If you do not hear from us by the 5th business day, please call us at [phone].           [employer] Access Code: Activation code not generated  Current [employer] Status: Active

## 2017-08-24 ENCOUNTER — TELEPHONE (OUTPATIENT)
Dept: MEDICAL GROUP | Facility: MEDICAL CENTER | Age: 22
End: 2017-08-24

## 2017-08-24 LAB
BACTERIA STL CULT: NORMAL
SIGNIFICANT IND 70042: NORMAL
SITE SITE: NORMAL
SOURCE SOURCE: NORMAL

## 2017-08-24 NOTE — TELEPHONE ENCOUNTER
1. Caller Name: Eugene                                         Call Back Number: [phone]      Patient approves a detailed voicemail message: yes    Pt's Brother Eugene called and states he tried calling [employer] to schedule Pt an apt. They received a call from our auth dept telling them it was already approved through Insurance. When they called [employer], someone in office told them they had not received the referral and was very unkind to them. I contacted [employer] and spoke with Jaye, she verified the referral was received. I called Eugene back and notified they do have the referral and that he can call and ask to speak directly to scheduling, if anyone is rude to him to ask to speak directly with Jaye or management. Eugene was very thankful for the help.

## 2017-08-28 ASSESSMENT — ENCOUNTER SYMPTOMS
DYSPNEA AT REST: 1
WHEEZING: 0
RESPIRATORY SYMPTOMS COMMENTS: BOTH
CHEST TIGHTNESS: 1
HEMOPTYSIS: 0
SHORTNESS OF BREATH: 1

## 2017-08-29 ENCOUNTER — OFFICE VISIT (OUTPATIENT)
Dept: PULMONOLOGY | Facility: HOSPICE | Age: 22
End: 2017-08-29
Payer: COMMERCIAL

## 2017-08-29 VITALS
TEMPERATURE: 97.7 F | HEART RATE: 93 BPM | RESPIRATION RATE: 16 BRPM | DIASTOLIC BLOOD PRESSURE: 70 MMHG | WEIGHT: 211.8 LBS | SYSTOLIC BLOOD PRESSURE: 122 MMHG | OXYGEN SATURATION: 94 % | BODY MASS INDEX: 36.16 KG/M2 | HEIGHT: 64 IN

## 2017-08-29 DIAGNOSIS — G47.33 OSA (OBSTRUCTIVE SLEEP APNEA): ICD-10-CM

## 2017-08-29 DIAGNOSIS — R93.89 NONSPECIFIC ABNORMAL FINDINGS ON RADIOLOGICAL AND EXAMINATION OF INTRATHORACIC ORGANS: ICD-10-CM

## 2017-08-29 DIAGNOSIS — Q90.9 DOWN SYNDROME: ICD-10-CM

## 2017-08-29 PROCEDURE — 99244 OFF/OP CNSLTJ NEW/EST MOD 40: CPT | Performed by: INTERNAL MEDICINE

## 2017-08-29 RX ORDER — ZOLPIDEM TARTRATE 5 MG/1
5 TABLET ORAL NIGHTLY PRN
Qty: 3 TAB | Refills: 0 | Status: SHIPPED | OUTPATIENT
Start: 2017-08-29 | End: 2018-07-09

## 2017-08-29 RX ORDER — FUROSEMIDE 20 MG/1
20 TABLET ORAL DAILY
Qty: 3 TAB | Refills: 0 | Status: SHIPPED | OUTPATIENT
Start: 2017-08-29 | End: 2017-09-01

## 2017-08-29 NOTE — PROGRESS NOTES
"Chief Complaint   Patient presents with   • Establish Care     Referred by EDWIN Jose for CT results.       HPI: This patient is a 21 y.o. male with Down's syndrome, who is referred for abnormal chest CAT scan. He is accompanied by his parents who provide medical history. He was hospitalized in May 2017 for \"pneumonia\", with chest x-ray showing bilateral, diffuse infiltrates. He was treated with Rocephin, Zithromax and Tamiflu, and discharged on Cefdinir and Zithromax. Clinically he improved somewhat, however has persistent intermittent dry cough, and follow-up chest x-ray showed incomplete resolution of infiltrates. Subsequent chest CAT scan was performed on August 18, 2017 showing diffuse ground glass opacities, no consolidations, effusions or adenopathy. The patient has profound daytime hypersomnolence, in fact was sleeping throughout today's interview. His parents state that he had been tested for sleep apnea in childhood however did not obtain enough sleep for diagnosis, and no further evaluation or treatment was pursued. They state that he has slept bent forward in a sitting position since childhood. He snores loudly and has witnessed apneas. He has very sporadic sleep and naps frequently in the day. His BMI is 36.  The patient's parents deny noting fevers or chills. They feel he is more short of breath with exertion than prior to his hospitalization. There is no history of former pneumonia or bronchitis. The patient and parents are nonsmokers.  He follows with cardiology, with former negative echocardiography and nuclear stress test in 2015.      Past Medical History:   Diagnosis Date   • Down syndrome     trisomy 21   • Impaired fasting glucose    • Low HDL (under 40)    • Sleep apnea     work up done as a child.  no tx   • Undescended testicle    • Vitamin D deficiency        Social History     Social History   • Marital status: Single     Spouse name: N/A   • Number of children: N/A   • Years of " education: N/A     Occupational History   • Not on file.     Social History Main Topics   • Smoking status: Never Smoker   • Smokeless tobacco: Never Used   • Alcohol use 0.0 oz/week      Comment: rarely   • Drug use: No   • Sexual activity: Not on file     Other Topics Concern   • Not on file     Social History Narrative   • No narrative on file       Family History   Problem Relation Age of Onset   • Heart Disease Mother      congenital heart defect   • Diabetes Maternal Grandfather    • Diabetes Paternal Grandmother    • Diabetes Paternal Grandfather        Current Outpatient Prescriptions on File Prior to Visit   Medication Sig Dispense Refill   • therapeutic multivitamin-minerals (THERAGRAN-M) Tab Take 1 Tab by mouth every day. 30 Tab 0   • Cholecalciferol (VITAMIN D PO) Take 1 Cap by mouth every day.     • metronidazole (FLAGYL) 500 MG Tab Take 1 Tab by mouth 3 times a day. 30 Tab 0   • naproxen (NAPROSYN) 500 MG Tab Take 1 Tab by mouth every 24 hours as needed. 30 Tab 0   • ipratropium-albuterol (DUONEB) 0.5-2.5 (3) MG/3ML nebulizer solution 3 mL by Nebulization route every four hours as needed for Shortness of Breath. (Patient not taking: Reported on 5/23/2017) 1 Vial 0     No current facility-administered medications on file prior to visit.        Allergies: Review of patient's allergies indicates no known allergies.    ROS:   Constitutional: Denies fevers, chills, night sweats,+ fatigue, denies weight loss  Eyes: Denies vision loss, pain, drainage, double vision  Ears, Nose, Throat: Denies earache, difficulty hearing, tinnitus, nasal congestion, hoarseness  Cardiovascular: Denies chest pain, tightness, palpitations, orthopnea or edema  Respiratory: As in HPI  Sleep: +daytime sleepiness, +snoring, +apneas, +insomnia, +morning headaches  GI: Denies heartburn, dysphagia, nausea, abdominal pain, diarrhea or constipation  : + frequent urination, denies hematuria, discharge or painful  "urination  Musculoskeletal: Denies back pain, painful joints, sore muscles  Neurological: Denies weakness, +headaches  Skin: No rashes    Blood pressure 122/70, pulse 93, temperature 36.5 °C (97.7 °F), resp. rate 16, height 1.626 m (5' 4\"), weight 96.1 kg (211 lb 12.8 oz), SpO2 94 %.    Physical Exam:  Appearance: Well-nourished, well-developed, falling asleep throughout interview  HEENT: Normocephalic, atraumatic, white sclera, PERRLA, oropharynx clear, micrognathia, Mallampati 4  Neck: No adenopathy or masses  Respiratory: no intercostal retractions or accessory muscle use  Lungs auscultation: Clear to auscultation bilaterally  Cardiovascular: Regular rate rhythm. No murmurs, rubs or gallops.  No LE edema  Abdomen: soft, obese  Gait: Normal  Digits: No clubbing, cyanosis  Motor: No focal deficits  Orientation: Oriented to person     Diagnosis:  1. FRIEDA (obstructive sleep apnea)  POLYSOMNOGRAPHY, 4 OR MORE    zolpidem (AMBIEN) 5 MG Tab    ECHOCARDIOGRAM COMP W/O CONT   2. Nonspecific abnormal findings on radiological and examination of intrathoracic organs     3. Down syndrome     4. BMI 36.0-36.9,adult         Plan:  The patient has Down's syndrome, with very high clinical suspicion for sleep-disordered breathing. There is a % incidence of FRIEDA in Down's children and incidence increases as children grown older. Unfortunately his childhood sleep study was non-diagnostic and no further evaluation was pursued. He was recently hospitalized in May 2017 for \"pneumonia\". His chest x-ray and subsequent chest CAT scan, showed diffuse groundglass opacities which may be secondary to pulmonary edema rather than infectious etiology or hypersensitivity pneumonitis, interstitial disease, etc. Last echocardiography was in 2015. Sleep-disordered breathing could certainly contribute to pulmonary hypertension and pulmonary edema. He has no symptoms suggestive of infection.  Recommend polysomnography in the sleep laboratory with " the use of a sleep aid for diagnosis and treatment of obstructive sleep apnea.  Update echocardiography to screen for pulmonary hypertension, valvular disease.   Trial on low-dose Lasix 20 mg daily for 3 days for cough.  No further antibiotics are prescribed. In the event of negative cardiac/FRIEDA workup, for unresolving infiltrates, then further evaluation by bronchoscopy would be indicated.  Return for after sleep study.      Answers for HPI/ROS submitted by the patient on 8/28/2017   What is the reason for your visit today?: Ground glass in lungs  Do you cough first thing in the morning or at other times during the day?: Yes  Do you have a cough on most days? If so, how long have you had this cough?: Yes  Bring up phlegm (mucus, sputum) in the morning or other times during the day?: Unknown  Do you cough up blood from your chest?: No  Do you experience wheezing?: No  Do you experience any chest tightness?: Yes  How often?: constant  Experience shortness of breath?: Yes  Experience shortness of breath at rest?: Yes  How far can you walk before becoming short of breath or need to rest? : When performing exercise   Please list what causes you to become short of breath:: Exercise   Have you ever been hospitalized?: Yes  Reason, year, and hospital in which you were hospitalized:: [facility] 2017 [facility]  Have you ever needed to be intubated or placed on a ventilator? : No  Have you ever had problems with anesthesia?: No  Have you experienced post-operative delirium?: No  Any complications with surgery?: No  What year did you receive your last Flu shot?: 2017  What year did you receive you last Pneumonia shot?: 2017  Have you had a TB skin test? If so, please list the year and result:: 2016 negative  Have you had Allergy skin testing? If so, please list the year and result:: No  Please list all your occupations from your first job to your current job. Include Industry/Company, location, year, and your specific job.: N/a  a  dar Job: No  a Mine or Quarry: No  a Foundry: No  with Pottery: No  Cotton Mill: No  Flax Mill: No  Hemp Mill: No  Brick Plant: No  with Asbestos: No  as a Sandblaster: No  manufacturing of glass, ceramics, or abrasives: No  Acids: No  Arsenics: No  Cadmium: No  Chromium: No  Fibrogenic Dust: No  Lead: No  Nickel: No  Plastic: No  Solvents: No  TDI: No  Uranium: No  Please list the places you have lived, the year, and Country or State in the [place]  Birds?: No  Dogs?: No  Cats?: No  Mice and/or Deer Mice?: No  Reptiles?: No  Blood Chemistries: Yes may 2018  Blood Count: Yes may 2017  Cardiac Ultrasound: Yes may 3017  Chest X-ray: Yes august 2017  Pulmonary Function Test: No  CT Scan, Sinus: No  Electrocardiogram: Yes may 2017  Sleep Study: Yes  Coffee: None  Decaffeinated coffee: None  Energy drinks: None  Tea: None  Carbonated soft drinks: 1    Conflicting answers have been found for some questions. Please document the patient's answers manually.

## 2017-09-08 ENCOUNTER — APPOINTMENT (OUTPATIENT)
Dept: CARDIOLOGY | Facility: MEDICAL CENTER | Age: 22
End: 2017-09-08
Attending: INTERNAL MEDICINE
Payer: COMMERCIAL

## 2017-09-08 DIAGNOSIS — G47.33 OSA (OBSTRUCTIVE SLEEP APNEA): ICD-10-CM

## 2017-09-08 LAB
LV EJECT FRACT  99904: 65
LV EJECT FRACT MOD 2C 99903: 62.48
LV EJECT FRACT MOD 4C 99902: 65.67
LV EJECT FRACT MOD BP 99901: 64.81

## 2017-09-08 PROCEDURE — 93306 TTE W/DOPPLER COMPLETE: CPT

## 2017-09-08 PROCEDURE — 93306 TTE W/DOPPLER COMPLETE: CPT | Mod: 26 | Performed by: INTERNAL MEDICINE

## 2017-09-20 ENCOUNTER — SLEEP STUDY (OUTPATIENT)
Dept: SLEEP MEDICINE | Facility: MEDICAL CENTER | Age: 22
End: 2017-09-20
Attending: INTERNAL MEDICINE
Payer: COMMERCIAL

## 2017-09-20 DIAGNOSIS — G47.33 OSA (OBSTRUCTIVE SLEEP APNEA): ICD-10-CM

## 2017-09-20 PROCEDURE — 95811 POLYSOM 6/>YRS CPAP 4/> PARM: CPT | Performed by: INTERNAL MEDICINE

## 2017-09-21 NOTE — PROCEDURES
CLINICAL COMMENTS:  The patient underwent a split night polysomnogram with a CPAP titration using the standard montage for measurement of parameters of sleep, respiratory events, movement abnormalities, heart rate and rhythm. A microphone was used to monitor snoring.    INTERPRETATION: The diagnostic recording time was 218.6 minutes with a sleep period of 189.1 minutes.  Total sleep time was 149.6 minutes with a sleep efficiency of 68.4%.  The sleep latency was 29.5 minutes, and REM latency was 149.5 minutes.  The patient had 38 arousals in total, for an arousal index of 15.2.        RESPIRATORY: The patient had 14 apneas in total.  Of these, 7 were obstructive apneas, and 7 were central apneas.  This resulted in an apnea index (AI) of 5.6.  The patient had 85 hypopneas in total, which resulted in a hypopnea index of 34.1.  The overall AHI was 39.7, while the AHI during REM was 64.3.  The supine AHI = 68.6.    OXIMETRY: Oxygen saturation monitoring showed a mean SpO2 of 92.3% for the diagnostic part of the study, with a minimum oxygen saturation of 85.0%.  Oxygen saturations were below 89% for 6.1% of sleep time.    CARDIAC: The highest heart rate for the first part of the study was 103.0 beats per minute.  The average heart rate during sleep was 84.1 bpm, while the highest heart rate was 103.0 bpm.    LIMB MOVEMENTS: There were a total of 0 periodic limb movements during sleep, of which 0 were PLMS arousals.  This resulted in a PLMS index of 0.0 and a PLMS arousal index of 0.0.    TREATMENT    Treatment recording time was 274.0 minutes with a total sleep time of 270.5min.  The patient had an arousal index of 7.5.      RESPIRATORY: The patient had 11 obstructive apneas, 10 central apneas, and 44 hypopneas for an overall AHI was 14.4.    OXIMETRY: The mean SpO2 during treatment was 93.6%, with a minimum oxygen saturation of 85.0%.      Interpretation:    This was an overnight diagnostic polysomnogram with a  subsequent positive airway pressure titration. During the Pap titration the patient chose to use a small [employer] Simplus mask with heated humidification.    During the diagnostic phase, the patient's sleep efficiency was reduced to 68.4% and he experienced one REM period. The sleep stage durations showed 39.5 minutes of wake after sleep onset, 12.5 minutes of stage I sleep, 84.1 minutes of stage II sleep, 39 minutes of stage III sleep, and 14 minutes of REM. The latency to sleep was prolonged at 29.5 minutes and the latency to REM was prolonged at 149.5 minutes. Sleep fragmentation was noted.    Severe obstructive sleep apnea hypopnea was found. The AHI was 39.7, the mean event duration was 18 seconds, and the longest event lasted 46.4 seconds. REM aggravated the sleep disordered breathing as evidenced by the all respiratory events REM index of 64.3. Similarly, the supine position worsened the sleep disordered breathing as reflected by the supine sleep index of 68.6.    Once the patient met the split-night protocol, the technician performed a positive airway pressure titration. The technician initiated CPAP at 5 cm of water pressure and gradually increased the pressure to reduce respiratory events and desaturations. The patient may have done best on CPAP at 12 cm of water pressure where REM sleep occurred. The resultant AHI was 3.8, the minimum saturation 90%, on the mean saturation 94.4%.    Recommendation:    Recommend initiating treatment with CPAP at 12 cm using a mask of choice and heated humidification followed by clinical and data card review in 6 weeks. Alternatively, auto titrating CPAP 12-16 cm is an option.

## 2017-09-28 ENCOUNTER — OFFICE VISIT (OUTPATIENT)
Dept: PULMONOLOGY | Facility: HOSPICE | Age: 22
End: 2017-09-28
Payer: COMMERCIAL

## 2017-09-28 VITALS
TEMPERATURE: 97.5 F | DIASTOLIC BLOOD PRESSURE: 76 MMHG | BODY MASS INDEX: 36.26 KG/M2 | RESPIRATION RATE: 16 BRPM | HEART RATE: 88 BPM | OXYGEN SATURATION: 94 % | HEIGHT: 64 IN | SYSTOLIC BLOOD PRESSURE: 124 MMHG | WEIGHT: 212.4 LBS

## 2017-09-28 DIAGNOSIS — E66.9 OBESITY (BMI 35.0-39.9 WITHOUT COMORBIDITY): ICD-10-CM

## 2017-09-28 DIAGNOSIS — Q90.9 DOWN SYNDROME: ICD-10-CM

## 2017-09-28 DIAGNOSIS — G47.33 OSA (OBSTRUCTIVE SLEEP APNEA): ICD-10-CM

## 2017-09-28 PROCEDURE — 99214 OFFICE O/P EST MOD 30 MIN: CPT | Performed by: INTERNAL MEDICINE

## 2017-09-28 RX ORDER — ZOLPIDEM TARTRATE 5 MG/1
5 TABLET ORAL NIGHTLY PRN
Qty: 30 TAB | Refills: 0 | Status: SHIPPED | OUTPATIENT
Start: 2017-09-28 | End: 2018-07-09

## 2017-09-28 NOTE — PROGRESS NOTES
"Chief Complaint   Patient presents with   • Results     Echo,SS results.   HPI: This patient is a 22 y.o. male with Down's syndrome, who returns for follow-up. He is accompanied by his parents. He was hospitalized in May 2017 for \"pneumonia\", with chest x-ray showing bilateral, diffuse infiltrates. He was treated with Rocephin, Zithromax and Tamiflu, and discharged on Cefdinir and Zithromax. Clinically he improved somewhat, however has persistent intermittent dry cough, and follow-up chest x-ray showed incomplete resolution of infiltrates. Subsequent chest CAT scan was performed on August 18, 2017 showing diffuse ground glass opacities, no consolidations, effusions or adenopathy. His parents state that he had been tested for sleep apnea in childhood however did not obtain enough sleep for diagnosis, and no further evaluation or treatment was pursued. They state that he has slept bent forward in a sitting position since childhood. He snores loudly and has witnessed apneas. He has profound daytime hypersomnolence. He underwent polysomnography in the sleep laboratory confirming severe obstructive sleep apnea syndrome with AHI of 39.7 events per hour associated with desaturation into the 80s percent. He was subsequent titrated on CPAP to 12 cm of water with resolved FRIEDA and hypoxia. His parents noted he was much more energetic on the following day.  The patient's parents deny noting fevers or chills. They feel he is more short of breath with exertion than prior to his hospitalization. The patient and parents are nonsmokers.  He follows with cardiology, with former negative echocardiography and nuclear stress test in 2015. Updated echocardiography on September 8, 2017 was a technically difficult study however was stable.        Past Medical History:   Diagnosis Date   • Down syndrome     trisomy 21   • Impaired fasting glucose    • Low HDL (under 40)    • Sleep apnea     work up done as a child.  no tx   • Undescended " testicle    • Vitamin D deficiency        Social History     Social History   • Marital status: Single     Spouse name: N/A   • Number of children: N/A   • Years of education: N/A     Occupational History   • Not on file.     Social History Main Topics   • Smoking status: Never Smoker   • Smokeless tobacco: Never Used   • Alcohol use 0.0 oz/week      Comment: rarely   • Drug use: No   • Sexual activity: Not on file     Other Topics Concern   • Not on file     Social History Narrative   • No narrative on file       Family History   Problem Relation Age of Onset   • Heart Disease Mother      congenital heart defect   • Diabetes Maternal Grandfather    • Diabetes Paternal Grandmother    • Diabetes Paternal Grandfather        Current Outpatient Prescriptions on File Prior to Visit   Medication Sig Dispense Refill   • therapeutic multivitamin-minerals (THERAGRAN-M) Tab Take 1 Tab by mouth every day. 30 Tab 0   • Cholecalciferol (VITAMIN D PO) Take 1 Cap by mouth every day.     • zolpidem (AMBIEN) 5 MG Tab Take 1 Tab by mouth at bedtime as needed for Sleep (1 to 3 po qhs prn insomnia/sleep study. Bring to sleep study.). 3 Tab 0   • metronidazole (FLAGYL) 500 MG Tab Take 1 Tab by mouth 3 times a day. 30 Tab 0   • naproxen (NAPROSYN) 500 MG Tab Take 1 Tab by mouth every 24 hours as needed. 30 Tab 0   • ipratropium-albuterol (DUONEB) 0.5-2.5 (3) MG/3ML nebulizer solution 3 mL by Nebulization route every four hours as needed for Shortness of Breath. (Patient not taking: Reported on 5/23/2017) 1 Vial 0     No current facility-administered medications on file prior to visit.        Allergies: Review of patient's allergies indicates no known allergies.    ROS:   Constitutional: Denies fevers, chills, night sweats, fatigue or weight loss  Eyes: Denies vision loss, pain, drainage, double vision  Ears, Nose, Throat: Denies earache, difficulty hearing, tinnitus, nasal congestion, hoarseness  Cardiovascular: Denies chest pain,  "tightness, palpitations, orthopnea or edema  Respiratory: Denies shortness of breath, cough, wheezing, hemoptysis  Sleep: Denies daytime sleepiness, snoring, apneas, insomnia, morning headaches  GI: Denies heartburn, dysphagia, nausea, abdominal pain, diarrhea or constipation  : Denies frequent urination, hematuria, discharge or painful urination  Musculoskeletal: Denies back pain, painful joints, sore muscles  Neurological: Denies weakness or headaches  Skin: No rashes    Blood pressure 124/76, pulse 88, temperature 36.4 °C (97.5 °F), resp. rate 16, height 1.626 m (5' 4\"), weight 96.3 kg (212 lb 6.4 oz), SpO2 94 %.    Physical Exam:  Appearance: Well-nourished, well-developed, in no acute distress  HEENT: Normocephalic, atraumatic, white sclera, PERRLA, Mallampati 4, micrognathia  Neck: No adenopathy or masses  Respiratory: no intercostal retractions or accessory muscle use  Lungs auscultation: Clear to auscultation bilaterally  Cardiovascular: Regular rate rhythm. No murmurs, rubs or gallops.  No LE edema  Abdomen: soft, nondistended  Gait: Normal  Digits: No clubbing, cyanosis  Motor: No focal deficits  Orientation: Oriented to time, person and place    Diagnosis:  1. FRIEDA (obstructive sleep apnea)     2. Down syndrome     3. Obesity (BMI 35.0-39.9 without comorbidity) ([facility])         Plan:  The patient has severe obstructive sleep apnea syndrome and would benefit from CPAP therapy. He will start AutoPap 10-15 cm H20 with the temporary use of low-dose Ambien as a sleep aid; his parents are concerned that he may not tolerate CPAP mask however he did well in the sleep laboratory and I am hopeful that he will acclimate to CPAP use.  We will download his CPAP compliance card on follow-up.    In terms of his abnormal chest CAT scan, suspect component of pulmonary edema, and once he has acclimated to CPAP, we will repeat chest imaging.  Return in about 8 weeks (around 11/23/2017).      "

## 2017-10-25 ENCOUNTER — TELEPHONE (OUTPATIENT)
Dept: SLEEP MEDICINE | Facility: MEDICAL CENTER | Age: 22
End: 2017-10-25

## 2017-10-25 NOTE — TELEPHONE ENCOUNTER
Patient's dad called stating pt still has not received machine. I called Cara at [facility] and she said somehow it was never submitted, she said it would be done today. I called pt's dad back and let him know [facility] would submit it today

## 2017-10-26 ENCOUNTER — OFFICE VISIT (OUTPATIENT)
Dept: MEDICAL GROUP | Facility: MEDICAL CENTER | Age: 22
End: 2017-10-26
Payer: COMMERCIAL

## 2017-10-26 VITALS
WEIGHT: 211 LBS | HEART RATE: 112 BPM | OXYGEN SATURATION: 92 % | TEMPERATURE: 97.3 F | BODY MASS INDEX: 36.02 KG/M2 | DIASTOLIC BLOOD PRESSURE: 60 MMHG | SYSTOLIC BLOOD PRESSURE: 110 MMHG | HEIGHT: 64 IN

## 2017-10-26 DIAGNOSIS — J01.80 OTHER SUBACUTE SINUSITIS: ICD-10-CM

## 2017-10-26 DIAGNOSIS — R09.02 HYPOXIA: ICD-10-CM

## 2017-10-26 DIAGNOSIS — R05.9 COUGH: ICD-10-CM

## 2017-10-26 PROCEDURE — 99214 OFFICE O/P EST MOD 30 MIN: CPT | Performed by: NURSE PRACTITIONER

## 2017-10-26 RX ORDER — DOXYCYCLINE HYCLATE 100 MG
100 TABLET ORAL 2 TIMES DAILY
Qty: 20 TAB | Refills: 0 | Status: SHIPPED | OUTPATIENT
Start: 2017-10-26 | End: 2018-07-09

## 2017-11-07 ENCOUNTER — TELEPHONE (OUTPATIENT)
Dept: PULMONOLOGY | Facility: HOSPICE | Age: 22
End: 2017-11-07

## 2017-11-07 NOTE — TELEPHONE ENCOUNTER
The patient's father called and he is very upset with [facility] as they have not gotten his son his CPAP machine and he states that it has been 2 months since the order was done.  He wanted me to email him the order so that he could take it elsewhere so I emailed it to him.

## 2017-11-07 NOTE — TELEPHONE ENCOUNTER
Pt's mother called and left message that they are possibly going to buy a machine because they still have not been set up.  I reached out to [facility] and found out that the patient has been approved and they will call him and schedule set up today

## 2017-11-08 ENCOUNTER — TELEPHONE (OUTPATIENT)
Dept: SLEEP MEDICINE | Facility: MEDICAL CENTER | Age: 22
End: 2017-11-08

## 2017-11-09 NOTE — TELEPHONE ENCOUNTER
Per patient's father,  (Jaguar), the CPAP was taking toolong through [facility], so they purchased CPAP for cash through [employer].    Faxed order to DME:  [email or web address] /  [phone] / [place] [phone]    Sent [employer] message to patient with order information for APAP.

## 2017-11-21 ENCOUNTER — APPOINTMENT (OUTPATIENT)
Dept: PULMONOLOGY | Facility: HOSPICE | Age: 22
End: 2017-11-21
Payer: COMMERCIAL

## 2018-06-29 ENCOUNTER — OFFICE VISIT (OUTPATIENT)
Dept: URGENT CARE | Facility: PHYSICIAN GROUP | Age: 23
End: 2018-06-29
Payer: COMMERCIAL

## 2018-06-29 ENCOUNTER — APPOINTMENT (OUTPATIENT)
Dept: RADIOLOGY | Facility: IMAGING CENTER | Age: 23
End: 2018-06-29
Attending: FAMILY MEDICINE
Payer: COMMERCIAL

## 2018-06-29 VITALS
BODY MASS INDEX: 33.81 KG/M2 | SYSTOLIC BLOOD PRESSURE: 110 MMHG | WEIGHT: 197 LBS | DIASTOLIC BLOOD PRESSURE: 62 MMHG | TEMPERATURE: 98.4 F | OXYGEN SATURATION: 95 % | HEART RATE: 96 BPM

## 2018-06-29 DIAGNOSIS — M25.552 LEFT HIP PAIN: ICD-10-CM

## 2018-06-29 DIAGNOSIS — R26.9 GAIT DISTURBANCE: ICD-10-CM

## 2018-06-29 PROCEDURE — 99213 OFFICE O/P EST LOW 20 MIN: CPT | Performed by: FAMILY MEDICINE

## 2018-06-29 PROCEDURE — 73502 X-RAY EXAM HIP UNI 2-3 VIEWS: CPT | Mod: TC,LT | Performed by: FAMILY MEDICINE

## 2018-07-01 ASSESSMENT — ENCOUNTER SYMPTOMS
WEIGHT LOSS: 0
MYALGIAS: 0
NECK PAIN: 0

## 2018-07-01 NOTE — PROGRESS NOTES
Subjective:      Sander Romero is a 22 y.o. male who presents with Hip Pain (x1 day. Pt complains of Lt hip pain. Caretakers state that Pt has been walking oddly recently.)            1 day left hip pain and change in gait/externally rotating LLE. No trauma or clear trigger. No PMH CHD. No knee pain. No clear back pain but walking slightly stooped to left. No myelopathy. No fever. No other aggravating or alleviating factors.          Review of Systems   Constitutional: Negative for malaise/fatigue and weight loss.   Musculoskeletal: Negative for myalgias and neck pain.   Skin: Negative for itching and rash.     .  Medications, Allergies, and current problem list reviewed today in Epic       Objective:     /62   Pulse 96   Temp 36.9 °C (98.4 °F)   Wt 89.4 kg (197 lb)   SpO2 95%   BMI 33.81 kg/m²      Physical Exam   Constitutional: He appears well-developed and well-nourished. No distress.   Features c/w PMH Down syndrome   Neck: Normal range of motion. Neck supple.   Musculoskeletal:   Left hip: tender anterior aspect, FROM, full strength, Distal neuro/vascular intact.       Back: no point tenderness or deformity.       Gait: external rotation of LLE while walking noted               Assessment/Plan:   Xray hip: negative per radiology    1. Left hip pain    - DX-HIP-COMPLETE - UNILATERAL 2+ LEFT; Future  - REFERRAL TO ORTHOPEDICS    2. Gait disturbance    - REFERRAL TO ORTHOPEDICS    Differential diagnosis, natural history, supportive care, and indications for immediate follow-up discussed at length.     Will refer to ortho. OTC nsaid prn.

## 2018-07-09 ENCOUNTER — OFFICE VISIT (OUTPATIENT)
Dept: MEDICAL GROUP | Facility: MEDICAL CENTER | Age: 23
End: 2018-07-09
Payer: COMMERCIAL

## 2018-07-09 VITALS
HEART RATE: 78 BPM | SYSTOLIC BLOOD PRESSURE: 110 MMHG | BODY MASS INDEX: 33.97 KG/M2 | HEIGHT: 64 IN | OXYGEN SATURATION: 96 % | TEMPERATURE: 99.5 F | WEIGHT: 199 LBS | DIASTOLIC BLOOD PRESSURE: 70 MMHG

## 2018-07-09 DIAGNOSIS — R73.01 IMPAIRED FASTING GLUCOSE: ICD-10-CM

## 2018-07-09 DIAGNOSIS — R10.9 LEFT FLANK PAIN: ICD-10-CM

## 2018-07-09 DIAGNOSIS — E78.6 LOW HDL (UNDER 40): ICD-10-CM

## 2018-07-09 DIAGNOSIS — E55.9 VITAMIN D DEFICIENCY: ICD-10-CM

## 2018-07-09 LAB
APPEARANCE UR: CLEAR
BILIRUB UR STRIP-MCNC: NORMAL MG/DL
COLOR UR AUTO: NORMAL
GLUCOSE UR STRIP.AUTO-MCNC: NORMAL MG/DL
KETONES UR STRIP.AUTO-MCNC: NORMAL MG/DL
LEUKOCYTE ESTERASE UR QL STRIP.AUTO: NORMAL
NITRITE UR QL STRIP.AUTO: NORMAL
PH UR STRIP.AUTO: 6 [PH] (ref 5–8)
PROT UR QL STRIP: NORMAL MG/DL
RBC UR QL AUTO: NORMAL
SP GR UR STRIP.AUTO: 1.02
UROBILINOGEN UR STRIP-MCNC: NORMAL MG/DL

## 2018-07-09 PROCEDURE — 99214 OFFICE O/P EST MOD 30 MIN: CPT | Performed by: NURSE PRACTITIONER

## 2018-07-09 PROCEDURE — 81002 URINALYSIS NONAUTO W/O SCOPE: CPT | Performed by: NURSE PRACTITIONER

## 2018-07-09 RX ORDER — DICLOFENAC SODIUM 75 MG/1
75 TABLET, DELAYED RELEASE ORAL 2 TIMES DAILY
Qty: 30 TAB | Refills: 0 | Status: SHIPPED | OUTPATIENT
Start: 2018-07-09 | End: 2018-08-27

## 2018-07-09 RX ORDER — TIZANIDINE 4 MG/1
4 TABLET ORAL
Qty: 15 TAB | Refills: 0 | Status: SHIPPED | OUTPATIENT
Start: 2018-07-09 | End: 2018-08-27

## 2018-07-09 ASSESSMENT — PATIENT HEALTH QUESTIONNAIRE - PHQ9: CLINICAL INTERPRETATION OF PHQ2 SCORE: 0

## 2018-07-09 NOTE — PROGRESS NOTES
Subjective:     Sander Romero is a 22 y.o. male who presents with left flank pain.    HPI:     Seen in f/u for left flank pain.  Sx started 6/27/18.  He started limping after shower.  Has a high pain tolerance.  Occurring intermittently.  They went to .  Xray on hip left was w nl  Continues to have intermittent pain.  Today he notes pain on left flank.  No hx of injury.   He is due updated lab in august.  His last d was low.   His a1c in 8/17 was 6.1.  Not on meds.        Patient Active Problem List    Diagnosis Date Noted   • Obesity (BMI 30-39.9) 05/23/2017   • Obesity (BMI 35.0-39.9 without comorbidity) (HCC) 05/23/2017   • Hypoxia 05/12/2017   • Dehydration, moderate 05/12/2017   • URI (upper respiratory infection) 05/12/2017   • Sepsis(995.91) 05/12/2017   • CAP (community acquired pneumonia) 05/12/2017   • Low HDL (under 40)    • Vitamin D deficiency    • Impaired fasting glucose    • Sleep apnea    • Undescended testicle    • Down syndrome        Current medicines (including changes today)  Current Outpatient Prescriptions   Medication Sig Dispense Refill   • diclofenac EC (VOLTAREN) 75 MG Tablet Delayed Response Take 1 Tab by mouth 2 times a day. 30 Tab 0   • tizanidine (ZANAFLEX) 4 MG Tab Take 1 Tab by mouth every bedtime. 15 Tab 0   • therapeutic multivitamin-minerals (THERAGRAN-M) Tab Take 1 Tab by mouth every day. 30 Tab 0   • ipratropium-albuterol (DUONEB) 0.5-2.5 (3) MG/3ML nebulizer solution 3 mL by Nebulization route every four hours as needed for Shortness of Breath. (Patient not taking: Reported on 5/23/2017) 1 Vial 0   • Cholecalciferol (VITAMIN D PO) Take 1 Cap by mouth every day.       No current facility-administered medications for this visit.        No Known Allergies    ROS  Constitutional: Negative. Negative for fever, chills, weight loss, malaise/fatigue and diaphoresis.   HENT: Negative. Negative for hearing loss, ear pain, nosebleeds, congestion, sore throat, neck pain,  "tinnitus and ear discharge.   Respiratory: Negative. Negative for cough, hemoptysis, sputum production, shortness of breath, wheezing and stridor.   Cardiovascular: Negative. Negative for chest pain, palpitations, orthopnea, claudication, leg swelling and PND.   Gastrointestinal: Denies nausea, vomiting, diarrhea, constipation, heartburn, melena or hematochezia.  Genitourinary: Denies dysuria, hematuria, urinary incontinence, frequency or urgency.        Objective:     Blood pressure 110/70, pulse 78, temperature 37.5 °C (99.5 °F), height 1.626 m (5' 4\"), weight 90.3 kg (199 lb), SpO2 96 %. Body mass index is 34.16 kg/m².    Physical Exam:  Physical Exam   Vitals reviewed.  Constitutional: oriented to person, place, and time. appears well-developed and well-nourished. No distress.   Cardiovascular: Normal rate, regular rhythm, normal heart sounds and intact distal pulses.  Exam reveals no gallop and no friction rub.  No murmur heard.  No carotid bruits.   Pulmonary/Chest: Effort normal and breath sounds normal. No stridor. No respiratory distress. no wheezes or rales. exhibits no tenderness.   Musculoskeletal: Normal range of motion. exhibits no edema. pretty pedal pulses 2+.  2+ pretty knee reflexes.  Sensation intact pretty le  Lymphadenopathy: no cervical or supraclavicular adenopathy.   Abd:  Mild left CVAT,  Soft,  Bs noted in all quadrants.  No HSM.  No abdominal tenderness.  UA in office wnl w/o blood  Neurological: alert and oriented to person, place, and time. exhibits normal muscle tone. Coordination normal.   Skin: Skin is warm and dry. no diaphoresis.   Psychiatric: normal mood and affect. behavior is normal.        Assessment and Plan:     The following treatment plan was discussed:    1. Left flank pain  diclofenac EC (VOLTAREN) 75 MG Tablet Delayed Response    tizanidine (ZANAFLEX) 4 MG Tab    DX-LUMBAR SPINE-2 OR 3 VIEWS    POCT Urinalysis    no blood in urine.  check lumbar xray.  use voltaren and tizanidine " prn pain   2. Impaired fasting glucose  COMP METABOLIC PANEL    HEMOGLOBIN A1C    MICROALBUMIN CREAT RATIO URINE    do lab and f/u 1 months.  f/u wiht pt family withxray results.    3. Vitamin D deficiency  VITAMIN D,25 HYDROXY   4. Low HDL (under 40)  COMP METABOLIC PANEL    LIPID PROFILE   5. BMI 34.0-34.9,adult  Patient identified as having weight management issue.  Appropriate orders and counseling given.         Followup: Return in about 4 weeks (around 8/6/2018).

## 2018-07-16 ENCOUNTER — APPOINTMENT (OUTPATIENT)
Dept: RADIOLOGY | Facility: IMAGING CENTER | Age: 23
End: 2018-07-16
Attending: NURSE PRACTITIONER
Payer: COMMERCIAL

## 2018-07-16 DIAGNOSIS — R10.9 LEFT FLANK PAIN: ICD-10-CM

## 2018-07-16 PROCEDURE — 72100 X-RAY EXAM L-S SPINE 2/3 VWS: CPT | Mod: 26 | Performed by: FAMILY MEDICINE

## 2018-07-17 ENCOUNTER — TELEPHONE (OUTPATIENT)
Dept: MEDICAL GROUP | Facility: MEDICAL CENTER | Age: 23
End: 2018-07-17

## 2018-07-17 NOTE — TELEPHONE ENCOUNTER
Please let pt's parents know that the lumbar xray is wnl.  How is he feeling?  Is he still having the pain?

## 2018-07-17 NOTE — LETTER
July 23, 2018        Sander Romero  [address]  [place] [address]        Dear Sander:    Kailee Jeffrey's office has tried contacting you. At your earliest convenience please contact our office at [phone].      If you have any questions or concerns, please don't hesitate to call.        Sincerely,        Kailee Jeffrey, [facility].    Electronically Signed

## 2018-08-23 ENCOUNTER — HOSPITAL ENCOUNTER (OUTPATIENT)
Dept: LAB | Facility: MEDICAL CENTER | Age: 23
End: 2018-08-23
Attending: NURSE PRACTITIONER
Payer: COMMERCIAL

## 2018-08-23 DIAGNOSIS — E55.9 VITAMIN D DEFICIENCY: ICD-10-CM

## 2018-08-23 DIAGNOSIS — E78.6 LOW HDL (UNDER 40): ICD-10-CM

## 2018-08-23 DIAGNOSIS — R73.01 IMPAIRED FASTING GLUCOSE: ICD-10-CM

## 2018-08-23 LAB
25(OH)D3 SERPL-MCNC: 19 NG/ML (ref 30–100)
ALBUMIN SERPL BCP-MCNC: 3.6 G/DL (ref 3.2–4.9)
ALBUMIN/GLOB SERPL: 0.9 G/DL
ALP SERPL-CCNC: 131 U/L (ref 30–99)
ALT SERPL-CCNC: 70 U/L (ref 2–50)
ANION GAP SERPL CALC-SCNC: 6 MMOL/L (ref 0–11.9)
AST SERPL-CCNC: 32 U/L (ref 12–45)
BILIRUB SERPL-MCNC: 0.6 MG/DL (ref 0.1–1.5)
BUN SERPL-MCNC: 12 MG/DL (ref 8–22)
CALCIUM SERPL-MCNC: 9.1 MG/DL (ref 8.5–10.5)
CHLORIDE SERPL-SCNC: 101 MMOL/L (ref 96–112)
CHOLEST SERPL-MCNC: 176 MG/DL (ref 100–199)
CO2 SERPL-SCNC: 26 MMOL/L (ref 20–33)
CREAT SERPL-MCNC: 0.65 MG/DL (ref 0.5–1.4)
CREAT UR-MCNC: 146 MG/DL
EST. AVERAGE GLUCOSE BLD GHB EST-MCNC: 146 MG/DL
GLOBULIN SER CALC-MCNC: 3.8 G/DL (ref 1.9–3.5)
GLUCOSE SERPL-MCNC: 111 MG/DL (ref 65–99)
HBA1C MFR BLD: 6.7 % (ref 0–5.6)
HDLC SERPL-MCNC: 36 MG/DL
LDLC SERPL CALC-MCNC: 110 MG/DL
MICROALBUMIN UR-MCNC: <0.7 MG/DL
MICROALBUMIN/CREAT UR: NORMAL MG/G (ref 0–30)
POTASSIUM SERPL-SCNC: 4.1 MMOL/L (ref 3.6–5.5)
PROT SERPL-MCNC: 7.4 G/DL (ref 6–8.2)
SODIUM SERPL-SCNC: 133 MMOL/L (ref 135–145)
TRIGL SERPL-MCNC: 150 MG/DL (ref 0–149)

## 2018-08-23 PROCEDURE — 82570 ASSAY OF URINE CREATININE: CPT

## 2018-08-23 PROCEDURE — 83036 HEMOGLOBIN GLYCOSYLATED A1C: CPT

## 2018-08-23 PROCEDURE — 36415 COLL VENOUS BLD VENIPUNCTURE: CPT

## 2018-08-23 PROCEDURE — 80053 COMPREHEN METABOLIC PANEL: CPT

## 2018-08-23 PROCEDURE — 80061 LIPID PANEL: CPT

## 2018-08-23 PROCEDURE — 82043 UR ALBUMIN QUANTITATIVE: CPT

## 2018-08-23 PROCEDURE — 82306 VITAMIN D 25 HYDROXY: CPT

## 2018-08-24 ENCOUNTER — TELEPHONE (OUTPATIENT)
Dept: MEDICAL GROUP | Facility: MEDICAL CENTER | Age: 23
End: 2018-08-24

## 2018-08-24 NOTE — TELEPHONE ENCOUNTER
ESTABLISHED PATIENT PRE-VISIT PLANNING     Note: Patient will not be contacted if there is no indication to call.     1.  Reviewed notes from the last few office visits within the medical group: Yes 7/9/2018    2.  If any orders were placed at last visit or intended to be done for this visit (i.e. 6 mos follow-up), do we have Results/Consult Notes?        •  Labs - Labs ordered, completed on 8/23/2018 and results are in chart.   Note: If patient appointment is for lab review and patient did not complete labs, check with provider if OK to reschedule patient until labs completed.       •  Imaging - Imaging ordered, completed and results are in chart.       •  Referrals - No referrals were ordered at last office visit.    3. Is this appointment scheduled as a Hospital Follow-Up? No    4.  Immunizations were updated in [facility] using [employer]?: Yes       •  [employer] Recommendations: FLU, HPV, MMR  and TD    5.  Patient is due for the following Health Maintenance Topics:   Health Maintenance Due   Topic Date Due   • IMM HPV VACCINE (2 of 3 - Male 3-dose series) 03/26/2012   • IMM INFLUENZA (1) 09/01/2018     6.  MDX printed for Provider? NO    7.  Patient was NOT informed to arrive 15 min prior to their scheduled appointment and bring in their medication bottles.

## 2018-08-27 ENCOUNTER — OFFICE VISIT (OUTPATIENT)
Dept: MEDICAL GROUP | Facility: MEDICAL CENTER | Age: 23
End: 2018-08-27
Payer: COMMERCIAL

## 2018-08-27 VITALS
RESPIRATION RATE: 20 BRPM | DIASTOLIC BLOOD PRESSURE: 64 MMHG | SYSTOLIC BLOOD PRESSURE: 110 MMHG | WEIGHT: 200 LBS | OXYGEN SATURATION: 96 % | BODY MASS INDEX: 34.15 KG/M2 | TEMPERATURE: 97.6 F | HEART RATE: 64 BPM | HEIGHT: 64 IN

## 2018-08-27 DIAGNOSIS — R09.89 DECREASED PEDAL PULSES: ICD-10-CM

## 2018-08-27 DIAGNOSIS — R73.01 IMPAIRED FASTING GLUCOSE: ICD-10-CM

## 2018-08-27 DIAGNOSIS — E78.6 LOW HDL (UNDER 40): ICD-10-CM

## 2018-08-27 DIAGNOSIS — M79.604 BILATERAL LEG PAIN: ICD-10-CM

## 2018-08-27 DIAGNOSIS — E55.9 VITAMIN D DEFICIENCY: ICD-10-CM

## 2018-08-27 DIAGNOSIS — E78.5 HYPERLIPIDEMIA LDL GOAL <100: ICD-10-CM

## 2018-08-27 DIAGNOSIS — R79.89 ELEVATED LFTS: ICD-10-CM

## 2018-08-27 DIAGNOSIS — M79.605 BILATERAL LEG PAIN: ICD-10-CM

## 2018-08-27 PROCEDURE — 99214 OFFICE O/P EST MOD 30 MIN: CPT | Performed by: NURSE PRACTITIONER

## 2018-08-27 RX ORDER — ERGOCALCIFEROL 1.25 MG/1
50000 CAPSULE ORAL
Qty: 12 CAP | Refills: 0 | Status: SHIPPED | OUTPATIENT
Start: 2018-08-27 | End: 2019-03-28

## 2018-08-27 NOTE — PROGRESS NOTES
Subjective:     Sander Romero is a 22 y.o. male who presents with pretty leg pain.    HPI:   Seen in f/u for pretty leg pain.  He was previous seen for left hip pain.  That has resolved.  Hip and lumbar xrays were wnl.  He is seen with brother and mother today.  She reports that its very difficult to determine his sx or where the pain is d/t his downs syndrome  [place] went [place].  He was not able to walk the park.  He was having pretty leg pain and difficulty with balance.  No SOB.  He has been able to walk the park in past.  Today in office pretty feet warm and pink but rt foot pulses dec.    Reviewed lab with pt and family.  GFR, alb/cr ratio IS WNL  Vitamin d is low.  Not on otc supplement.  LP shows borderline trg at 150, HDL is low and LDL is high at 110.  Goal is <100.   a1ci s 6.7.  That is up from 6.1.  Not on healthy diet or exercise.   CMP shows sl low na.  Elevated glucose and lft's.  They are impvored from last time.  Hepatitis panel, ferritin and Fe/tibc was wnl in past.     Patient Active Problem List    Diagnosis Date Noted   • Obesity (BMI 30-39.9) 05/23/2017   • Obesity (BMI 35.0-39.9 without comorbidity) (HCC) 05/23/2017   • Hypoxia 05/12/2017   • Dehydration, moderate 05/12/2017   • URI (upper respiratory infection) 05/12/2017   • Sepsis(995.91) 05/12/2017   • CAP (community acquired pneumonia) 05/12/2017   • Low HDL (under 40)    • Vitamin D deficiency    • Impaired fasting glucose    • Sleep apnea    • Undescended testicle    • Down syndrome        Current medicines (including changes today)  Current Outpatient Prescriptions   Medication Sig Dispense Refill   • vitamin D, Ergocalciferol, (DRISDOL) 11343 units Cap capsule Take 1 Cap by mouth every 7 days. 12 Cap 0   • therapeutic multivitamin-minerals (THERAGRAN-M) Tab Take 1 Tab by mouth every day. 30 Tab 0   • Cholecalciferol (VITAMIN D PO) Take 1 Cap by mouth every day.       No current facility-administered medications for  "this visit.        No Known Allergies    ROS  Constitutional: Negative. Negative for fever, chills, weight loss, malaise/fatigue and diaphoresis.   HENT: Negative. Negative for hearing loss, ear pain, nosebleeds, congestion, sore throat, neck pain, tinnitus and ear discharge.   Respiratory: Negative. Negative for cough, hemoptysis, sputum production, shortness of breath, wheezing and stridor.   Cardiovascular: Negative. Negative for chest pain, palpitations, orthopnea, claudication, leg swelling and PND.   Gastrointestinal: Denies nausea, vomiting, diarrhea, constipation, heartburn, melena or hematochezia.  Genitourinary: Denies dysuria, hematuria, urinary incontinence, frequency or urgency.        Objective:     Blood pressure 110/64, pulse 64, temperature 36.4 °C (97.6 °F), resp. rate 20, height 1.626 m (5' 4\"), weight 90.7 kg (200 lb), SpO2 96 %. Body mass index is 34.33 kg/m².    Physical Exam:  Vitals reviewed.  Constitutional:pt nonverbal w/downs syndrome. appears well-developed and well-nourished. No distress.   Cardiovascular: Normal rate, regular rhythm, normal heart sounds and intact distal pulses. Exam reveals no gallop and no friction rub. No murmur heard. No carotid bruits.   Pulmonary/Chest: Effort normal and breath sounds normal. No stridor. No respiratory distress. no wheezes or rales. exhibits no tenderness.   Musculoskeletal: Normal range of motion. exhibits no edema. 2+ left pedal pulses 2+ but rt foot only 1+.  pretty feet are warm to touch         Lymphadenopathy: No cervical or supraclavicular adenopathy.   Neurological: exhibits normal muscle tone.  Skin: Skin is warm and dry. No diaphoresis.   Psychiatric: Normal mood and affect. Behavior is normal.      Assessment and Plan:     The following treatment plan was discussed:    1. Impaired fasting glucose  HEMOGLOBIN A1C    a1c is up from last time.  recheck lab 4 months.  f/u for review.  improve low complex carb diet and exercise   2. Low HDL " (under 40)      chronic and stable.  not on meds. will continue to monitor   3. Vitamin D deficiency  vitamin D, Ergocalciferol, (DRISDOL) 95705 units Cap capsule    VITAMIN D,25 HYDROXY    ergocalciferol x12 weeks then d3 2000 units dalily   4. Hyperlipidemia LDL goal <100      trg at 150.  HDL chroinically low and LDL too high.  improve healthy diet and exercise.  recheck 3 months   5. Elevated LFTs  US-ABDOMEN COMPLETE SURVEY    HEPATIC FUNCTION PANEL    check abd us.  has been elevated in past.  sl improved from last time   6. Bilateral leg pain  US-VAIBHAV MULTI LEVEL BILAT    DX-KNEE COMPLETE 4+ LEFT    DX-KNEE COMPLETE 4+ RIGHT    DX-FOOT-COMPLETE 3+ LEFT    DX-FOOT-COMPLETE 3+ RIGHT    pulses in rt foot dec compared tto left. foot pink and warm.  check A1C IN 4 MONTHS. f/u for review.  check VAIBHAV.  f/u with pt monther w/results.   7. Decreased pedal pulses  US-VAIBHAV MULTI LEVEL BILAT    DX-KNEE COMPLETE 4+ LEFT         Followup: Return in about 4 months (around 12/27/2018).

## 2018-08-31 ENCOUNTER — HOSPITAL ENCOUNTER (OUTPATIENT)
Dept: RADIOLOGY | Facility: MEDICAL CENTER | Age: 23
End: 2018-08-31
Attending: NURSE PRACTITIONER
Payer: COMMERCIAL

## 2018-08-31 DIAGNOSIS — R79.89 ELEVATED LFTS: ICD-10-CM

## 2018-08-31 PROCEDURE — 76700 US EXAM ABDOM COMPLETE: CPT

## 2018-09-03 ENCOUNTER — TELEPHONE (OUTPATIENT)
Dept: MEDICAL GROUP | Facility: MEDICAL CENTER | Age: 23
End: 2018-09-03

## 2018-09-08 ENCOUNTER — HOSPITAL ENCOUNTER (OUTPATIENT)
Dept: RADIOLOGY | Facility: MEDICAL CENTER | Age: 23
End: 2018-09-08
Attending: NURSE PRACTITIONER
Payer: COMMERCIAL

## 2018-09-08 DIAGNOSIS — M79.605 BILATERAL LEG PAIN: ICD-10-CM

## 2018-09-08 DIAGNOSIS — M79.604 BILATERAL LEG PAIN: ICD-10-CM

## 2018-09-08 DIAGNOSIS — R09.89 DECREASED PEDAL PULSES: ICD-10-CM

## 2018-09-08 PROCEDURE — 73630 X-RAY EXAM OF FOOT: CPT | Mod: LT

## 2018-09-08 PROCEDURE — 73564 X-RAY EXAM KNEE 4 OR MORE: CPT | Mod: RT

## 2018-09-11 ENCOUNTER — TELEPHONE (OUTPATIENT)
Dept: MEDICAL GROUP | Facility: MEDICAL CENTER | Age: 23
End: 2018-09-11

## 2018-09-12 NOTE — TELEPHONE ENCOUNTER
Please let pt family know that his xrays of pretty feet and knees are wnl.  We can try PT to see if that will help if they want.  He already has a ortho referral from  in june.  When does he see them?

## 2019-03-19 ENCOUNTER — HOSPITAL ENCOUNTER (OUTPATIENT)
Dept: LAB | Facility: MEDICAL CENTER | Age: 24
End: 2019-03-19
Attending: NURSE PRACTITIONER
Payer: COMMERCIAL

## 2019-03-19 DIAGNOSIS — E55.9 VITAMIN D DEFICIENCY: ICD-10-CM

## 2019-03-19 DIAGNOSIS — R73.01 IMPAIRED FASTING GLUCOSE: ICD-10-CM

## 2019-03-19 DIAGNOSIS — R79.89 ELEVATED LFTS: ICD-10-CM

## 2019-03-19 LAB
25(OH)D3 SERPL-MCNC: 26 NG/ML (ref 30–100)
ALBUMIN SERPL BCP-MCNC: 3.4 G/DL (ref 3.2–4.9)
ALBUMIN/GLOB SERPL: 0.9 G/DL
ALP SERPL-CCNC: 216 U/L (ref 30–99)
ALT SERPL-CCNC: 53 U/L (ref 2–50)
ANION GAP SERPL CALC-SCNC: 4 MMOL/L (ref 0–11.9)
AST SERPL-CCNC: 35 U/L (ref 12–45)
BILIRUB CONJ SERPL-MCNC: 0.2 MG/DL (ref 0.1–0.5)
BILIRUB INDIRECT SERPL-MCNC: 0.6 MG/DL (ref 0–1)
BILIRUB SERPL-MCNC: 0.8 MG/DL (ref 0.1–1.5)
BUN SERPL-MCNC: 14 MG/DL (ref 8–22)
CALCIUM SERPL-MCNC: 9.6 MG/DL (ref 8.5–10.5)
CHLORIDE SERPL-SCNC: 103 MMOL/L (ref 96–112)
CHOLEST SERPL-MCNC: 141 MG/DL (ref 100–199)
CO2 SERPL-SCNC: 27 MMOL/L (ref 20–33)
CREAT SERPL-MCNC: 0.45 MG/DL (ref 0.5–1.4)
CREAT UR-MCNC: 119.4 MG/DL
EST. AVERAGE GLUCOSE BLD GHB EST-MCNC: 137 MG/DL
GLOBULIN SER CALC-MCNC: 3.7 G/DL (ref 1.9–3.5)
GLUCOSE SERPL-MCNC: 141 MG/DL (ref 65–99)
HBA1C MFR BLD: 6.4 % (ref 0–5.6)
HDLC SERPL-MCNC: 26 MG/DL
LDLC SERPL CALC-MCNC: 85 MG/DL
MICROALBUMIN UR-MCNC: <0.7 MG/DL
MICROALBUMIN/CREAT UR: NORMAL MG/G (ref 0–30)
POTASSIUM SERPL-SCNC: 4 MMOL/L (ref 3.6–5.5)
PROT SERPL-MCNC: 7.1 G/DL (ref 6–8.2)
SODIUM SERPL-SCNC: 134 MMOL/L (ref 135–145)
TRIGL SERPL-MCNC: 151 MG/DL (ref 0–149)

## 2019-03-19 PROCEDURE — 36415 COLL VENOUS BLD VENIPUNCTURE: CPT

## 2019-03-19 PROCEDURE — 82306 VITAMIN D 25 HYDROXY: CPT

## 2019-03-19 PROCEDURE — 82570 ASSAY OF URINE CREATININE: CPT

## 2019-03-19 PROCEDURE — 82248 BILIRUBIN DIRECT: CPT

## 2019-03-19 PROCEDURE — 83036 HEMOGLOBIN GLYCOSYLATED A1C: CPT

## 2019-03-19 PROCEDURE — 80061 LIPID PANEL: CPT

## 2019-03-19 PROCEDURE — 82043 UR ALBUMIN QUANTITATIVE: CPT

## 2019-03-19 PROCEDURE — 80053 COMPREHEN METABOLIC PANEL: CPT

## 2019-03-20 ENCOUNTER — TELEPHONE (OUTPATIENT)
Dept: MEDICAL GROUP | Facility: MEDICAL CENTER | Age: 24
End: 2019-03-20

## 2019-03-20 NOTE — TELEPHONE ENCOUNTER
Phone Number Called: [phone] (home)       Message: Tried to contact patient, left message for patient to call [phone]      Left Message for patient to call back: N\A

## 2019-03-20 NOTE — TELEPHONE ENCOUNTER
----- Message from JOSE MIGUEL Jose sent at 3/19/2019  3:22 PM PDT -----  Please have pt set appointment to review and discuss treatment for labs.

## 2019-03-28 ENCOUNTER — OFFICE VISIT (OUTPATIENT)
Dept: MEDICAL GROUP | Facility: MEDICAL CENTER | Age: 24
End: 2019-03-28
Payer: COMMERCIAL

## 2019-03-28 VITALS
TEMPERATURE: 98 F | WEIGHT: 186 LBS | HEIGHT: 64 IN | OXYGEN SATURATION: 98 % | BODY MASS INDEX: 31.76 KG/M2 | HEART RATE: 96 BPM | SYSTOLIC BLOOD PRESSURE: 112 MMHG | DIASTOLIC BLOOD PRESSURE: 62 MMHG

## 2019-03-28 DIAGNOSIS — R73.01 IFG (IMPAIRED FASTING GLUCOSE): ICD-10-CM

## 2019-03-28 DIAGNOSIS — E78.5 HYPERLIPIDEMIA LDL GOAL <100: ICD-10-CM

## 2019-03-28 DIAGNOSIS — R79.89 ELEVATED LFTS: ICD-10-CM

## 2019-03-28 DIAGNOSIS — Z23 NEED FOR HPV VACCINATION: ICD-10-CM

## 2019-03-28 DIAGNOSIS — G47.30 SLEEP APNEA, UNSPECIFIED TYPE: ICD-10-CM

## 2019-03-28 PROCEDURE — 99214 OFFICE O/P EST MOD 30 MIN: CPT | Mod: 25 | Performed by: NURSE PRACTITIONER

## 2019-03-28 PROCEDURE — 90651 9VHPV VACCINE 2/3 DOSE IM: CPT | Performed by: NURSE PRACTITIONER

## 2019-03-28 PROCEDURE — 90471 IMMUNIZATION ADMIN: CPT | Performed by: NURSE PRACTITIONER

## 2019-03-28 NOTE — PROGRESS NOTES
Subjective:     Sander Romero is a 23 y.o. male who presents with IFG.    HPI:   Seen in f/u for IFG.  Mother has noted that he has rapid HR when sleeping.  Also waking up with headache.  He has been dx with sleep apnea but won't wear CPAP.    He has lost weight. Parents have stopped buying high calorie snacks and sodas.  Mother is having pt walk for 15 min tid.  He is jose elias well.  Reviewed lab with pt mother.  Pt has downs syndrome.  GFR, alb/cr ratio IS WNL.    VITAMIN D mildly dec at 26.  He is on otc supplement.  Mother gives to him daily.    LP shows trg are 151.  HDL remains low at in 30's.  LDL is at goal.  CMP shows elevated glucose but otherwise wnl.  A1C is stable at 6.4.  He is not on meds.    HFP shows elevated alk phos and SGPT.  abd us in past has shown fatty liver.   He is due 2nd HPV today.     Patient Active Problem List    Diagnosis Date Noted   • Obesity (BMI 30-39.9) 05/23/2017   • Obesity (BMI 35.0-39.9 without comorbidity) (HCC) 05/23/2017   • Hypoxia 05/12/2017   • Dehydration, moderate 05/12/2017   • URI (upper respiratory infection) 05/12/2017   • Sepsis(995.91) 05/12/2017   • CAP (community acquired pneumonia) 05/12/2017   • Low HDL (under 40)    • Vitamin D deficiency    • Impaired fasting glucose    • Sleep apnea    • Undescended testicle    • Down syndrome        Current medicines (including changes today)  Current Outpatient Prescriptions   Medication Sig Dispense Refill   • therapeutic multivitamin-minerals (THERAGRAN-M) Tab Take 1 Tab by mouth every day. 30 Tab 0   • Cholecalciferol (VITAMIN D PO) Take 1 Cap by mouth every day.       No current facility-administered medications for this visit.        No Known Allergies    ROS  Constitutional: Negative. Negative for fever, chills, weight loss, malaise/fatigue and diaphoresis.   HENT: Negative. Negative for hearing loss, ear pain, nosebleeds, congestion, sore throat, neck pain, tinnitus and ear discharge.   Respiratory:  "Negative. Negative for cough, hemoptysis, sputum production, shortness of breath, wheezing and stridor.   Cardiovascular: Negative. Negative for chest pain, palpitations, orthopnea, claudication, leg swelling and PND.   Gastrointestinal: Denies nausea, vomiting, diarrhea, constipation, heartburn, melena or hematochezia.  Genitourinary: Denies dysuria, hematuria, urinary incontinence, frequency or urgency.        Objective:     Blood pressure 112/62, pulse 96, temperature 36.7 °C (98 °F), temperature source Temporal, height 1.626 m (5' 4\"), weight 84.4 kg (186 lb), SpO2 98 %. Body mass index is 31.93 kg/m².    Physical Exam:  Vitals reviewed.  Constitutional:  appears well-developed and well-nourished. No distress.   Cardiovascular: Normal rate, regular rhythm, normal heart sounds and intact distal pulses. Exam reveals no gallop and no friction rub. No murmur heard. No carotid bruits.   Pulmonary/Chest: Effort normal and breath sounds normal. No stridor. No respiratory distress. no wheezes or rales. exhibits no tenderness.   Musculoskeletal: Normal range of motion. exhibits no edema. pretty pedal pulses 2+.  Lymphadenopathy: No cervical or supraclavicular adenopathy.   Neurological: downs syndrome. exhibits normal muscle tone.  Skin: Skin is warm and dry. No diaphoresis.   Psychiatric: Normal mood and affect. Behavior is normal.      Assessment and Plan:     The following treatment plan was discussed:    1. IFG (impaired fasting glucose)  HEMOGLOBIN A1C    stable w/o meds.  continue healthy diet and exercise.  enc inc exercise.  f/u 6 months for lab review   2. Sleep apnea, unspecified type      pt refuses CPAP despite sx   3. Elevated LFTs  HEPATIC FUNCTION PANEL    stable and d/t fatty liver.  continue activity and exercise with diet   4. Hyperlipidemia LDL goal <100      continue healthy diet and activity.     5. Need for HPV vaccination  Gardasil 9         Followup: Return in about 6 months (around 9/28/2019).  "

## 2019-04-30 ENCOUNTER — HOSPITAL ENCOUNTER (OUTPATIENT)
Dept: LAB | Facility: MEDICAL CENTER | Age: 24
End: 2019-04-30
Attending: NURSE PRACTITIONER
Payer: COMMERCIAL

## 2019-04-30 ENCOUNTER — OFFICE VISIT (OUTPATIENT)
Dept: MEDICAL GROUP | Facility: MEDICAL CENTER | Age: 24
End: 2019-04-30
Payer: COMMERCIAL

## 2019-04-30 VITALS
OXYGEN SATURATION: 98 % | BODY MASS INDEX: 30.73 KG/M2 | HEIGHT: 64 IN | DIASTOLIC BLOOD PRESSURE: 60 MMHG | WEIGHT: 180 LBS | TEMPERATURE: 98.6 F | SYSTOLIC BLOOD PRESSURE: 120 MMHG | HEART RATE: 101 BPM

## 2019-04-30 DIAGNOSIS — R00.0 TACHYCARDIA: ICD-10-CM

## 2019-04-30 DIAGNOSIS — R19.7 DIARRHEA, UNSPECIFIED TYPE: ICD-10-CM

## 2019-04-30 DIAGNOSIS — R29.818 OTHER SYMPTOMS AND SIGNS INVOLVING THE NERVOUS SYSTEM: ICD-10-CM

## 2019-04-30 DIAGNOSIS — R41.0 CONFUSION: ICD-10-CM

## 2019-04-30 DIAGNOSIS — R21 RASH: ICD-10-CM

## 2019-04-30 LAB
25(OH)D3 SERPL-MCNC: 26 NG/ML (ref 30–100)
ALBUMIN SERPL BCP-MCNC: 3.4 G/DL (ref 3.2–4.9)
ALBUMIN/GLOB SERPL: 0.9 G/DL
ALP SERPL-CCNC: 235 U/L (ref 30–99)
ALT SERPL-CCNC: 56 U/L (ref 2–50)
ANION GAP SERPL CALC-SCNC: 6 MMOL/L (ref 0–11.9)
AST SERPL-CCNC: 37 U/L (ref 12–45)
BASOPHILS # BLD AUTO: 1.1 % (ref 0–1.8)
BASOPHILS # BLD: 0.06 K/UL (ref 0–0.12)
BILIRUB SERPL-MCNC: 0.8 MG/DL (ref 0.1–1.5)
BUN SERPL-MCNC: 12 MG/DL (ref 8–22)
CALCIUM SERPL-MCNC: 10 MG/DL (ref 8.5–10.5)
CHLORIDE SERPL-SCNC: 102 MMOL/L (ref 96–112)
CO2 SERPL-SCNC: 26 MMOL/L (ref 20–33)
COMMENT 1642: NORMAL
CREAT SERPL-MCNC: 0.4 MG/DL (ref 0.5–1.4)
CREAT UR-MCNC: 57.4 MG/DL
CRP SERPL HS-MCNC: 0.94 MG/DL (ref 0–0.75)
EOSINOPHIL # BLD AUTO: 0.05 K/UL (ref 0–0.51)
EOSINOPHIL NFR BLD: 0.9 % (ref 0–6.9)
ERYTHROCYTE [DISTWIDTH] IN BLOOD BY AUTOMATED COUNT: 46.1 FL (ref 35.9–50)
ERYTHROCYTE [SEDIMENTATION RATE] IN BLOOD BY WESTERGREN METHOD: 40 MM/HOUR (ref 0–15)
FASTING STATUS PATIENT QL REPORTED: NORMAL
FOLATE SERPL-MCNC: >23.6 NG/ML
GLOBULIN SER CALC-MCNC: 3.6 G/DL (ref 1.9–3.5)
GLUCOSE SERPL-MCNC: 133 MG/DL (ref 65–99)
HCT VFR BLD AUTO: 47.4 % (ref 42–52)
HGB BLD-MCNC: 15.8 G/DL (ref 14–18)
IMM GRANULOCYTES # BLD AUTO: 0.02 K/UL (ref 0–0.11)
IMM GRANULOCYTES NFR BLD AUTO: 0.4 % (ref 0–0.9)
LYMPHOCYTES # BLD AUTO: 2.38 K/UL (ref 1–4.8)
LYMPHOCYTES NFR BLD: 43.2 % (ref 22–41)
MCH RBC QN AUTO: 30.7 PG (ref 27–33)
MCHC RBC AUTO-ENTMCNC: 33.3 G/DL (ref 33.7–35.3)
MCV RBC AUTO: 92.2 FL (ref 81.4–97.8)
MICROALBUMIN UR-MCNC: <0.7 MG/DL
MICROALBUMIN/CREAT UR: NORMAL MG/G (ref 0–30)
MONOCYTES # BLD AUTO: 0.6 K/UL (ref 0–0.85)
MONOCYTES NFR BLD AUTO: 10.9 % (ref 0–13.4)
MORPHOLOGY BLD-IMP: NORMAL
NEUTROPHILS # BLD AUTO: 2.4 K/UL (ref 1.82–7.42)
NEUTROPHILS NFR BLD: 43.5 % (ref 44–72)
NRBC # BLD AUTO: 0 K/UL
NRBC BLD-RTO: 0 /100 WBC
PLATELET # BLD AUTO: ABNORMAL K/UL (ref 164–446)
POTASSIUM SERPL-SCNC: 4.2 MMOL/L (ref 3.6–5.5)
PROT SERPL-MCNC: 7 G/DL (ref 6–8.2)
RBC # BLD AUTO: 5.14 M/UL (ref 4.7–6.1)
SODIUM SERPL-SCNC: 134 MMOL/L (ref 135–145)
T4 FREE SERPL-MCNC: 2.75 NG/DL (ref 0.53–1.43)
TSH SERPL DL<=0.005 MIU/L-ACNC: 0.01 UIU/ML (ref 0.38–5.33)
VIT B12 SERPL-MCNC: 815 PG/ML (ref 211–911)
WBC # BLD AUTO: 5.5 K/UL (ref 4.8–10.8)

## 2019-04-30 PROCEDURE — 82570 ASSAY OF URINE CREATININE: CPT

## 2019-04-30 PROCEDURE — 82043 UR ALBUMIN QUANTITATIVE: CPT

## 2019-04-30 PROCEDURE — 86140 C-REACTIVE PROTEIN: CPT

## 2019-04-30 PROCEDURE — 82306 VITAMIN D 25 HYDROXY: CPT

## 2019-04-30 PROCEDURE — 84439 ASSAY OF FREE THYROXINE: CPT

## 2019-04-30 PROCEDURE — 86039 ANTINUCLEAR ANTIBODIES (ANA): CPT

## 2019-04-30 PROCEDURE — 99214 OFFICE O/P EST MOD 30 MIN: CPT | Performed by: NURSE PRACTITIONER

## 2019-04-30 PROCEDURE — 83036 HEMOGLOBIN GLYCOSYLATED A1C: CPT

## 2019-04-30 PROCEDURE — 82746 ASSAY OF FOLIC ACID SERUM: CPT

## 2019-04-30 PROCEDURE — 86038 ANTINUCLEAR ANTIBODIES: CPT

## 2019-04-30 PROCEDURE — 82607 VITAMIN B-12: CPT

## 2019-04-30 PROCEDURE — 36415 COLL VENOUS BLD VENIPUNCTURE: CPT

## 2019-04-30 PROCEDURE — 85025 COMPLETE CBC W/AUTO DIFF WBC: CPT

## 2019-04-30 PROCEDURE — 80053 COMPREHEN METABOLIC PANEL: CPT

## 2019-04-30 PROCEDURE — 85652 RBC SED RATE AUTOMATED: CPT

## 2019-04-30 PROCEDURE — 84443 ASSAY THYROID STIM HORMONE: CPT

## 2019-04-30 RX ORDER — METHYLPREDNISOLONE 4 MG/1
TABLET ORAL
Qty: 21 TAB | Refills: 0 | Status: SHIPPED | OUTPATIENT
Start: 2019-04-30 | End: 2019-05-09

## 2019-04-30 ASSESSMENT — PATIENT HEALTH QUESTIONNAIRE - PHQ9
CLINICAL INTERPRETATION OF PHQ2 SCORE: 4
5. POOR APPETITE OR OVEREATING: 0 - NOT AT ALL
SUM OF ALL RESPONSES TO PHQ QUESTIONS 1-9: 13

## 2019-04-30 NOTE — PROGRESS NOTES
Subjective:     Sander Romero is a 23 y.o. male who presents with rash and memory changes.    HPI:   Seen in f/u for rash and memory changes.  He had diarrhea initially.  Started 2 weeks ago.  Lasted for 1 day and resolved.    Then he started having a rash.  It is all over his body.  He is not sleeping d/t the itching.  Red.  Tried otc antiitching meds and cremes.  Benadryl Will help for only 3 hrs then resolve.   Now bm's are normal.  He was incontinent for a week.  No abd pain.  Fever x1 day only  He has a hx of bumps on scrotal.  Now they are more.  Not resolving despite showed and cream.  He uses a specail soap usually that helps.  Now not helping.    He is starting to be forgetful.  That is totally new.    Loosing weight w/o trying.   His HR is lot higher than normal even with rest.   No new meds.  No new products.    Mother thinks that he has muscular pain.    He used to have a routine but now he is more confused.    He is also uncontrolled jerking.  That is new.  Occurring in UE.         Patient Active Problem List    Diagnosis Date Noted   • Obesity (BMI 30-39.9) 05/23/2017   • Obesity (BMI 35.0-39.9 without comorbidity) (HCC) 05/23/2017   • Hypoxia 05/12/2017   • Dehydration, moderate 05/12/2017   • URI (upper respiratory infection) 05/12/2017   • Sepsis(995.91) 05/12/2017   • CAP (community acquired pneumonia) 05/12/2017   • Low HDL (under 40)    • Vitamin D deficiency    • Impaired fasting glucose    • Sleep apnea    • Undescended testicle    • Down syndrome        Current medicines (including changes today)  Current Outpatient Prescriptions   Medication Sig Dispense Refill   • MethylPREDNISolone (MEDROL DOSEPAK) 4 MG Tablet Therapy Pack As directed on the packaging label. 21 Tab 0   • therapeutic multivitamin-minerals (THERAGRAN-M) Tab Take 1 Tab by mouth every day. 30 Tab 0   • Cholecalciferol (VITAMIN D PO) Take 1 Cap by mouth every day.       No current facility-administered medications  "for this visit.        No Known Allergies    ROS  Constitutional: Negative. Negative for fever, chills, weight loss, malaise/fatigue and diaphoresis.   HENT: Negative. Negative for hearing loss, ear pain, nosebleeds, congestion, sore throat, neck pain, tinnitus and ear discharge.   Respiratory: Negative. Negative for cough, hemoptysis, sputum production, shortness of breath, wheezing and stridor.   Cardiovascular: Negative. Negative for chest pain, palpitations, orthopnea, claudication, leg swelling and PND.   Gastrointestinal: Denies nausea, vomiting, constipation, heartburn, melena or hematochezia.  Genitourinary: Denies dysuria, hematuria, urinary incontinence, frequency or urgency.        Objective:     /60 (BP Location: Right arm, Patient Position: Sitting)   Pulse (!) 101   Temp 37 °C (98.6 °F) (Temporal)   Ht 1.626 m (5' 4\")   Wt 81.6 kg (180 lb)   SpO2 98%  Body mass index is 30.9 kg/m².    Physical Exam:  Vitals reviewed.  Constitutional: Oriented to person, place, and time. appears well-developed and well-nourished. No distress.   Cardiovascular: Normal rate, regular rhythm, normal heart sounds and intact distal pulses. Exam reveals no gallop and no friction rub. No murmur heard. No carotid bruits.   Pulmonary/Chest: Effort normal and breath sounds normal. No stridor. No respiratory distress. no wheezes or rales. exhibits no tenderness.   Musculoskeletal: Normal range of motion. exhibits no edema. pretty pedal pulses 2+.  Lymphadenopathy: No cervical or supraclavicular adenopathy.   Neurological: Alert and oriented to person, place, and time. exhibits normal muscle tone.  Skin: Skin is warm and dry. No diaphoresis.   Psychiatric: Normal mood and affect. Behavior is normal.      Assessment and Plan:     The following treatment plan was discussed:    1. Confusion  MethylPREDNISolone (MEDROL DOSEPAK) 4 MG Tablet Therapy Pack    MR-BRAIN-WITH & W/O    CBC WITH DIFFERENTIAL    Comp Metabolic Panel    " HEMOGLOBIN A1C    MICROALBUMIN CREAT RATIO URINE    TSH    FREE THYROXINE    VITAMIN D,25 HYDROXY    VITAMIN B12    FOLATE    WESTERGREN SED RATE    CRP QUANTITIVE (NON-CARDIAC)    ZAYNAB REFLEXIVE PROFILE    do MRI brain.  also ordered lab.  f/u 1 week   2. Rash  MethylPREDNISolone (MEDROL DOSEPAK) 4 MG Tablet Therapy Pack    MR-BRAIN-WITH & W/O    CBC WITH DIFFERENTIAL    Comp Metabolic Panel    HEMOGLOBIN A1C    MICROALBUMIN CREAT RATIO URINE    TSH    FREE THYROXINE    VITAMIN D,25 HYDROXY    VITAMIN B12    FOLATE    WESTERGREN SED RATE    CRP QUANTITIVE (NON-CARDIAC)    ZAYNAB REFLEXIVE PROFILE    not sleeping d/t the itching.  try medrol dose hipolito.  f/u 1 week   3. Diarrhea, unspecified type  MethylPREDNISolone (MEDROL DOSEPAK) 4 MG Tablet Therapy Pack    MR-BRAIN-WITH & W/O    CBC WITH DIFFERENTIAL    Comp Metabolic Panel    HEMOGLOBIN A1C    MICROALBUMIN CREAT RATIO URINE    TSH    FREE THYROXINE    VITAMIN D,25 HYDROXY    VITAMIN B12    FOLATE    WESTERGREN SED RATE    CRP QUANTITIVE (NON-CARDIAC)    ZAYNAB REFLEXIVE PROFILE    resolved   4. Other symptoms and signs involving the nervous system  MethylPREDNISolone (MEDROL DOSEPAK) 4 MG Tablet Therapy Pack    MR-BRAIN-WITH & W/O    CBC WITH DIFFERENTIAL    Comp Metabolic Panel    HEMOGLOBIN A1C    MICROALBUMIN CREAT RATIO URINE    TSH    FREE THYROXINE    VITAMIN D,25 HYDROXY    VITAMIN B12    FOLATE    WESTERGREN SED RATE    CRP QUANTITIVE (NON-CARDIAC)    ZAYNAB REFLEXIVE PROFILE   5. Tachycardia  MethylPREDNISolone (MEDROL DOSEPAK) 4 MG Tablet Therapy Pack    MR-BRAIN-WITH & W/O    CBC WITH DIFFERENTIAL    Comp Metabolic Panel    HEMOGLOBIN A1C    MICROALBUMIN CREAT RATIO URINE    TSH    FREE THYROXINE    VITAMIN D,25 HYDROXY    VITAMIN B12    FOLATE    WESTERGREN SED RATE    CRP QUANTITIVE (NON-CARDIAC)    ZAYNAB REFLEXIVE PROFILE    EKG in office read by me:  ST/NSR.  RSR' V1, V2.  that is chronic and seen on last EKG.  no acute chgs.           Followup: Return in  about 1 week (around 5/7/2019).

## 2019-05-01 ENCOUNTER — TELEPHONE (OUTPATIENT)
Dept: MEDICAL GROUP | Facility: MEDICAL CENTER | Age: 24
End: 2019-05-01

## 2019-05-01 DIAGNOSIS — R25.1 TREMOR OF BOTH HANDS: ICD-10-CM

## 2019-05-01 DIAGNOSIS — E05.90 HYPERTHYROIDISM: ICD-10-CM

## 2019-05-01 DIAGNOSIS — R21 RASH: ICD-10-CM

## 2019-05-01 DIAGNOSIS — R41.0 CONFUSION: ICD-10-CM

## 2019-05-01 DIAGNOSIS — R49.9 CHANGE IN VOICE: ICD-10-CM

## 2019-05-01 LAB
EST. AVERAGE GLUCOSE BLD GHB EST-MCNC: 143 MG/DL
HBA1C MFR BLD: 6.6 % (ref 0–5.6)
NUCLEAR IGG SER QL IA: DETECTED

## 2019-05-01 RX ORDER — METHIMAZOLE 5 MG/1
5 TABLET ORAL 2 TIMES DAILY
Qty: 60 TAB | Refills: 0 | Status: SHIPPED | OUTPATIENT
Start: 2019-05-01 | End: 2019-06-04 | Stop reason: SDUPTHER

## 2019-05-01 NOTE — TELEPHONE ENCOUNTER
Tc to pts mother.  Reviewed lab with low TSH and high T4.  Exp urgent referral to endocrinology, more lab needed, uptake scan and neck us ordered.  Also exp ordered low dose tapazole.  Reviewed risks and benefits of tapazole with pt including but not limited to: liver, blood cell and kidney abn with the med.  She will do all the orders and f/u with me in 1 week.    Vitamin d is low so will inc otc d supplement.

## 2019-05-02 ENCOUNTER — TELEPHONE (OUTPATIENT)
Dept: MEDICAL GROUP | Facility: MEDICAL CENTER | Age: 24
End: 2019-05-02

## 2019-05-02 DIAGNOSIS — R76.8 POSITIVE ANA (ANTINUCLEAR ANTIBODY): ICD-10-CM

## 2019-05-02 NOTE — TELEPHONE ENCOUNTER
Please let pt know that the ZAYNAB is now +.  This is concerning for an immunological problem as well as the thyroid issue.  This is prob why the CRP and ESR is abn.  i will add additional lab to the other lab that i ordered yesterday.  Please do asap

## 2019-05-03 ENCOUNTER — HOSPITAL ENCOUNTER (OUTPATIENT)
Dept: LAB | Facility: MEDICAL CENTER | Age: 24
End: 2019-05-03
Attending: NURSE PRACTITIONER
Payer: COMMERCIAL

## 2019-05-03 DIAGNOSIS — R25.1 TREMOR OF BOTH HANDS: ICD-10-CM

## 2019-05-03 DIAGNOSIS — R49.9 CHANGE IN VOICE: ICD-10-CM

## 2019-05-03 DIAGNOSIS — R76.8 POSITIVE ANA (ANTINUCLEAR ANTIBODY): ICD-10-CM

## 2019-05-03 DIAGNOSIS — R41.0 CONFUSION: ICD-10-CM

## 2019-05-03 DIAGNOSIS — R79.89 ELEVATED LFTS: ICD-10-CM

## 2019-05-03 DIAGNOSIS — R21 RASH: ICD-10-CM

## 2019-05-03 DIAGNOSIS — E05.90 HYPERTHYROIDISM: ICD-10-CM

## 2019-05-03 LAB
ALBUMIN SERPL BCP-MCNC: 3.5 G/DL (ref 3.2–4.9)
ALP SERPL-CCNC: 214 U/L (ref 30–99)
ALT SERPL-CCNC: 45 U/L (ref 2–50)
ANA INTERPRETIVE COMMENT Q5143: NORMAL
ANTINUCLEAR ANTIBODY (ANA), HEP-2, IGG Q5142: NORMAL
AST SERPL-CCNC: 33 U/L (ref 12–45)
BILIRUB CONJ SERPL-MCNC: 0.2 MG/DL (ref 0.1–0.5)
BILIRUB INDIRECT SERPL-MCNC: 0.6 MG/DL (ref 0–1)
BILIRUB SERPL-MCNC: 0.8 MG/DL (ref 0.1–1.5)
C3 SERPL-MCNC: 169 MG/DL (ref 87–200)
C4 SERPL-MCNC: 35 MG/DL (ref 19–52)
PROT SERPL-MCNC: 7.4 G/DL (ref 6–8.2)
RHEUMATOID FACT SER IA-ACNC: <10 IU/ML (ref 0–14)
T3FREE SERPL-MCNC: 16.28 PG/ML (ref 2.4–4.2)
THYROPEROXIDASE AB SERPL-ACNC: 489.6 IU/ML (ref 0–9)

## 2019-05-03 PROCEDURE — 84075 ASSAY ALKALINE PHOSPHATASE: CPT

## 2019-05-03 PROCEDURE — 86800 THYROGLOBULIN ANTIBODY: CPT

## 2019-05-03 PROCEDURE — 86376 MICROSOMAL ANTIBODY EACH: CPT

## 2019-05-03 PROCEDURE — 86431 RHEUMATOID FACTOR QUANT: CPT

## 2019-05-03 PROCEDURE — 84481 FREE ASSAY (FT-3): CPT

## 2019-05-03 PROCEDURE — 86160 COMPLEMENT ANTIGEN: CPT | Mod: 91

## 2019-05-03 PROCEDURE — 86162 COMPLEMENT TOTAL (CH50): CPT

## 2019-05-03 PROCEDURE — 80076 HEPATIC FUNCTION PANEL: CPT

## 2019-05-03 PROCEDURE — 84080 ASSAY ALKALINE PHOSPHATASES: CPT

## 2019-05-03 PROCEDURE — 36415 COLL VENOUS BLD VENIPUNCTURE: CPT

## 2019-05-03 PROCEDURE — 86200 CCP ANTIBODY: CPT

## 2019-05-05 LAB
CCP IGG SERPL-ACNC: 13 UNITS (ref 0–19)
THYROGLOB AB SERPL-ACNC: 4.3 IU/ML (ref 0–4)

## 2019-05-06 LAB — CH50 SERPL-ACNC: 143 CAE UNITS (ref 60–144)

## 2019-05-08 LAB
ALP BONE SERPL-CCNC: 77 U/L (ref 0–55)
ALP ISOS SERPL HS-CCNC: 0 U/L
ALP LIVER SERPL-CCNC: 181 U/L (ref 0–94)
ALP SERPL-CCNC: 258 U/L (ref 40–120)

## 2019-05-09 ENCOUNTER — OFFICE VISIT (OUTPATIENT)
Dept: MEDICAL GROUP | Facility: MEDICAL CENTER | Age: 24
End: 2019-05-09
Payer: COMMERCIAL

## 2019-05-09 VITALS
OXYGEN SATURATION: 96 % | DIASTOLIC BLOOD PRESSURE: 60 MMHG | HEIGHT: 64 IN | WEIGHT: 181 LBS | BODY MASS INDEX: 30.9 KG/M2 | SYSTOLIC BLOOD PRESSURE: 118 MMHG | TEMPERATURE: 99.3 F | HEART RATE: 111 BPM

## 2019-05-09 DIAGNOSIS — R70.0 ESR RAISED: ICD-10-CM

## 2019-05-09 DIAGNOSIS — R73.01 IFG (IMPAIRED FASTING GLUCOSE): ICD-10-CM

## 2019-05-09 DIAGNOSIS — R79.89 ELEVATED LFTS: ICD-10-CM

## 2019-05-09 DIAGNOSIS — K76.0 FATTY LIVER: ICD-10-CM

## 2019-05-09 DIAGNOSIS — E05.90 HYPERTHYROIDISM: ICD-10-CM

## 2019-05-09 DIAGNOSIS — E55.9 VITAMIN D DEFICIENCY: ICD-10-CM

## 2019-05-09 DIAGNOSIS — R79.82 CRP ELEVATED: ICD-10-CM

## 2019-05-09 DIAGNOSIS — R76.8 ANA POSITIVE: ICD-10-CM

## 2019-05-09 DIAGNOSIS — R94.6 ABNORMAL THYROID FUNCTION TEST: ICD-10-CM

## 2019-05-09 PROCEDURE — 99214 OFFICE O/P EST MOD 30 MIN: CPT | Performed by: NURSE PRACTITIONER

## 2019-05-09 NOTE — PROGRESS NOTES
Subjective:     Sander Romero is a 23 y.o. male who presents with hyperthyroidism.    HPI:   Seen in f/u for hyperthyroidism. Pt has downs syndrome and is nonverbal.  He is seen today with his mother.    He was recently seen for confusion, rapid heart rate, diarrhea and rash.  He was given medrol dose hipolito for the rash but that has not helped.  Reviewed lab with pts mother.  His TSH is very low.  He is not on thyroid med.  His T3 & T4 & TPO are also very elevated.  He was started on tapazole earlier this week d/t the abn TSH.  So far there is no change in his rapid HR.  Mother reports that he takes cold showers.  HR will go up to 150 bpm with exercise.  They have not been encouraging exercise d/t HR.    CBC, alb/cr ratio, B12, folate, CCP, RA FACTOR, C3, C4, CH50 is wnl.  a1c is up to 6.6. He is not on meds.    Vitamin d is low.  He is on otc supplement.  Mother will increase dose  ESR, CRP are both elevated of ? Etiology  ZAYNAB is + at 1:80.  Further testing for poss etiology is unrevealing.    Alk phos is elevated.  Fractionation shows some bone but more liver.  abd us last year shows fatty liver.    He has been referred to endocrinology.  Mother will set appt.  Pt also has order for MRI brain, endocrinology referral and neck us.  She will get these tests done.    Exp patho of abn thyroid function tests with pt mother.     Patient Active Problem List    Diagnosis Date Noted   • Obesity (BMI 35.0-39.9 without comorbidity) (HCC) 05/23/2017   • Sepsis(995.91) 05/12/2017   • CAP (community acquired pneumonia) 05/12/2017   • Low HDL (under 40)    • Vitamin D deficiency    • Impaired fasting glucose    • Sleep apnea    • Undescended testicle    • Down syndrome        Current medicines (including changes today)  Current Outpatient Prescriptions   Medication Sig Dispense Refill   • methimazole (TAPAZOLE) 5 MG Tab Take 1 Tab by mouth 2 Times a Day. 60 Tab 0   • therapeutic multivitamin-minerals (THERAGRAN-M) Tab  "Take 1 Tab by mouth every day. 30 Tab 0   • Cholecalciferol (VITAMIN D PO) Take 1 Cap by mouth every day.       No current facility-administered medications for this visit.        No Known Allergies    ROS  Constitutional: Negative. Negative for fever, chills, weight loss, malaise/fatigue and diaphoresis.   HENT: Negative. Negative for hearing loss, ear pain, nosebleeds, congestion, sore throat, neck pain, tinnitus and ear discharge.   Respiratory: Negative. Negative for cough, hemoptysis, sputum production, shortness of breath, wheezing and stridor.   Cardiovascular: Negative. Negative for chest pain, palpitations, orthopnea, claudication, leg swelling and PND.   Gastrointestinal: Denies nausea, vomiting, diarrhea, constipation, heartburn, melena or hematochezia.  Genitourinary: Denies dysuria, hematuria, urinary incontinence, frequency or urgency.        Objective:     /60 (BP Location: Right arm, Patient Position: Sitting)   Pulse (!) 111   Temp 37.4 °C (99.3 °F) (Temporal)   Ht 1.626 m (5' 4\")   Wt 82.1 kg (181 lb)   SpO2 96%  Body mass index is 31.07 kg/m².    Physical Exam:  Vitals reviewed.  Constitutional:  appears well-developed and well-nourished. No distress.   Cardiovascular: Normal rate, regular rhythm, normal heart sounds and intact distal pulses. Exam reveals no gallop and no friction rub. No murmur heard. No carotid bruits.   Pulmonary/Chest: Effort normal and breath sounds normal. No stridor. No respiratory distress. no wheezes or rales. exhibits no tenderness.   Musculoskeletal: Normal range of motion. exhibits no edema. pretty pedal pulses 2+.  Lymphadenopathy: No cervical or supraclavicular adenopathy.   Neurological:  exhibits normal muscle tone.  Skin: Skin is warm and dry. No diaphoresis.   Psychiatric: Normal mood and affect. Behavior is normal.      Assessment and Plan:     The following treatment plan was discussed:    1. Hyperthyroidism      continue on tapazole.  do neck us and " thyroid uptake scan.  f/u with endocrinology.  mother will make appt   2. Abnormal thyroid function test     3. ZAYNAB positive      will recheck once tx for thyroid has been completed.  no etiology known at this time   4. ESR raised      ? etiology for elevated ESR and CRP.  will recheck once endocrinology has completed w/u.  call for lab slip.  then f/u appt to review lab.    5. CRP elevated     6. Elevated LFTs      chronic and stable. r/t fatty liver   7. Fatty liver      will limit exercise until hyperthyroidism under control   8. Vitamin D deficiency      inc otc supplement.  recheck d with neck lab   9. IFG (impaired fasting glucose)      a1c is up. will recheck when thyroid w/u and tx has completed.         Followup: Return if symptoms worsen or fail to improve.

## 2019-05-13 ENCOUNTER — TELEPHONE (OUTPATIENT)
Dept: MEDICAL GROUP | Facility: MEDICAL CENTER | Age: 24
End: 2019-05-13

## 2019-05-13 NOTE — TELEPHONE ENCOUNTER
i don't know if there is another office that can see them earlier.  They can check with other endocrinologist here in [place] or [place].  If they find another that will get him in quicker then i will refer.    Alternative is to give him some sleep med until he can get in or increase the tapazole.

## 2019-05-13 NOTE — TELEPHONE ENCOUNTER
Pt father called states pt is not sleeping and his endo apt isn't until end of June. They are concerned and want to know if they can get a referral to different facility to get seen faster.

## 2019-05-17 ENCOUNTER — HOSPITAL ENCOUNTER (OUTPATIENT)
Dept: RADIOLOGY | Facility: MEDICAL CENTER | Age: 24
End: 2019-05-17
Attending: NURSE PRACTITIONER
Payer: COMMERCIAL

## 2019-05-17 DIAGNOSIS — R41.0 CONFUSION: ICD-10-CM

## 2019-05-17 DIAGNOSIS — R00.0 TACHYCARDIA: ICD-10-CM

## 2019-05-17 DIAGNOSIS — R29.818 OTHER SYMPTOMS AND SIGNS INVOLVING THE NERVOUS SYSTEM: ICD-10-CM

## 2019-05-17 DIAGNOSIS — R49.9 CHANGE IN VOICE: ICD-10-CM

## 2019-05-17 DIAGNOSIS — R21 RASH: ICD-10-CM

## 2019-05-17 DIAGNOSIS — R25.1 TREMOR OF BOTH HANDS: ICD-10-CM

## 2019-05-17 DIAGNOSIS — E05.90 HYPERTHYROIDISM: ICD-10-CM

## 2019-05-17 DIAGNOSIS — R19.7 DIARRHEA, UNSPECIFIED TYPE: ICD-10-CM

## 2019-05-17 PROCEDURE — 70553 MRI BRAIN STEM W/O & W/DYE: CPT

## 2019-05-17 PROCEDURE — 76536 US EXAM OF HEAD AND NECK: CPT

## 2019-05-17 PROCEDURE — A9585 GADOBUTROL INJECTION: HCPCS | Performed by: NURSE PRACTITIONER

## 2019-05-17 PROCEDURE — 700117 HCHG RX CONTRAST REV CODE 255: Performed by: NURSE PRACTITIONER

## 2019-05-17 RX ORDER — GADOBUTROL 604.72 MG/ML
7.5 INJECTION INTRAVENOUS ONCE
Status: COMPLETED | OUTPATIENT
Start: 2019-05-17 | End: 2019-05-17

## 2019-05-17 RX ADMIN — GADOBUTROL 7.5 ML: 604.72 INJECTION INTRAVENOUS at 09:09

## 2019-05-20 ENCOUNTER — HOSPITAL ENCOUNTER (OUTPATIENT)
Dept: RADIOLOGY | Facility: MEDICAL CENTER | Age: 24
End: 2019-05-20
Attending: NURSE PRACTITIONER
Payer: COMMERCIAL

## 2019-05-20 ENCOUNTER — TELEPHONE (OUTPATIENT)
Dept: HEALTH INFORMATION MANAGEMENT | Facility: MEDICAL CENTER | Age: 24
End: 2019-05-20

## 2019-05-20 ENCOUNTER — TELEPHONE (OUTPATIENT)
Dept: MEDICAL GROUP | Facility: MEDICAL CENTER | Age: 24
End: 2019-05-20

## 2019-05-20 DIAGNOSIS — R21 RASH: ICD-10-CM

## 2019-05-20 DIAGNOSIS — R41.0 CONFUSION: ICD-10-CM

## 2019-05-20 DIAGNOSIS — R25.1 TREMOR OF BOTH HANDS: ICD-10-CM

## 2019-05-20 DIAGNOSIS — R49.9 CHANGE IN VOICE: ICD-10-CM

## 2019-05-20 DIAGNOSIS — E05.90 HYPERTHYROIDISM: ICD-10-CM

## 2019-05-20 PROCEDURE — 78014 THYROID IMAGING W/BLOOD FLOW: CPT

## 2019-05-20 PROCEDURE — A9516 IODINE I-123 SOD IODIDE MIC: HCPCS

## 2019-05-20 NOTE — TELEPHONE ENCOUNTER
Please let pts mother know that the thyroid uptake scan is consistent with hyperthyroidism only.

## 2019-05-21 ENCOUNTER — TELEPHONE (OUTPATIENT)
Dept: MEDICAL GROUP | Facility: MEDICAL CENTER | Age: 24
End: 2019-05-21

## 2019-05-21 NOTE — TELEPHONE ENCOUNTER
Please let pt mother know that the neck us shows enlarged thyroid w/o nodules.  He has enlarged lymph nodes.  Unknown if this is r/t his thyroid or other etiology.  Will recheck neck us in 6 weeks.

## 2019-06-04 DIAGNOSIS — R21 RASH: ICD-10-CM

## 2019-06-04 DIAGNOSIS — R49.9 CHANGE IN VOICE: ICD-10-CM

## 2019-06-04 DIAGNOSIS — E05.90 HYPERTHYROIDISM: ICD-10-CM

## 2019-06-04 DIAGNOSIS — R41.0 CONFUSION: ICD-10-CM

## 2019-06-04 DIAGNOSIS — R25.1 TREMOR OF BOTH HANDS: ICD-10-CM

## 2019-06-05 DIAGNOSIS — E05.90 HYPERTHYROIDISM: ICD-10-CM

## 2019-06-05 DIAGNOSIS — R41.0 CONFUSION: ICD-10-CM

## 2019-06-05 DIAGNOSIS — R49.9 CHANGE IN VOICE: ICD-10-CM

## 2019-06-05 DIAGNOSIS — R25.1 TREMOR OF BOTH HANDS: ICD-10-CM

## 2019-06-05 DIAGNOSIS — R21 RASH: ICD-10-CM

## 2019-06-05 RX ORDER — METHIMAZOLE 5 MG/1
5 TABLET ORAL 2 TIMES DAILY
Qty: 60 TAB | Refills: 0 | Status: SHIPPED | OUTPATIENT
Start: 2019-06-05 | End: 2019-06-05 | Stop reason: SDUPTHER

## 2019-06-05 RX ORDER — METHIMAZOLE 5 MG/1
5 TABLET ORAL 2 TIMES DAILY
Qty: 60 TAB | Refills: 0 | Status: SHIPPED | OUTPATIENT
Start: 2019-06-05 | End: 2019-06-19

## 2019-06-05 NOTE — TELEPHONE ENCOUNTER
From: Sander Romero  To: JOSE MIGUEL Jose  Sent: 6/4/2019 5:38 PM PDT  Subject: Medication Renewal Request    Sander Romero would like a refill of the following medications:     methimazole (TAPAZOLE) 5 MG Tab [JOSE MIGUEL Jose]   Patient Comment: I need a refill on this medication because I have only a four day supply left.    Preferred pharmacy: [facility] [place], [place] - [address] [place]

## 2019-06-19 ENCOUNTER — OFFICE VISIT (OUTPATIENT)
Dept: ENDOCRINOLOGY | Facility: MEDICAL CENTER | Age: 24
End: 2019-06-19
Payer: COMMERCIAL

## 2019-06-19 ENCOUNTER — HOSPITAL ENCOUNTER (OUTPATIENT)
Dept: LAB | Facility: MEDICAL CENTER | Age: 24
End: 2019-06-19
Attending: NURSE PRACTITIONER
Payer: COMMERCIAL

## 2019-06-19 VITALS
WEIGHT: 181 LBS | DIASTOLIC BLOOD PRESSURE: 68 MMHG | OXYGEN SATURATION: 96 % | HEIGHT: 65 IN | HEART RATE: 109 BPM | SYSTOLIC BLOOD PRESSURE: 110 MMHG | BODY MASS INDEX: 30.16 KG/M2

## 2019-06-19 DIAGNOSIS — E05.90 HYPERTHYROIDISM: ICD-10-CM

## 2019-06-19 DIAGNOSIS — E66.9 OBESITY (BMI 35.0-39.9 WITHOUT COMORBIDITY): ICD-10-CM

## 2019-06-19 DIAGNOSIS — R73.01 IMPAIRED FASTING GLUCOSE: ICD-10-CM

## 2019-06-19 DIAGNOSIS — E55.9 VITAMIN D DEFICIENCY: ICD-10-CM

## 2019-06-19 DIAGNOSIS — E11.9 TYPE 2 DIABETES MELLITUS WITHOUT COMPLICATION, WITHOUT LONG-TERM CURRENT USE OF INSULIN (HCC): ICD-10-CM

## 2019-06-19 PROCEDURE — 99214 OFFICE O/P EST MOD 30 MIN: CPT | Performed by: PHYSICIAN ASSISTANT

## 2019-06-19 PROCEDURE — 36415 COLL VENOUS BLD VENIPUNCTURE: CPT

## 2019-06-19 PROCEDURE — 84481 FREE ASSAY (FT-3): CPT

## 2019-06-19 PROCEDURE — 84681 ASSAY OF C-PEPTIDE: CPT

## 2019-06-19 PROCEDURE — 84480 ASSAY TRIIODOTHYRONINE (T3): CPT

## 2019-06-19 PROCEDURE — 84439 ASSAY OF FREE THYROXINE: CPT

## 2019-06-19 PROCEDURE — 83520 IMMUNOASSAY QUANT NOS NONAB: CPT

## 2019-06-19 PROCEDURE — 84443 ASSAY THYROID STIM HORMONE: CPT

## 2019-06-19 PROCEDURE — 86341 ISLET CELL ANTIBODY: CPT

## 2019-06-19 RX ORDER — PROPRANOLOL HYDROCHLORIDE 10 MG/1
10 TABLET ORAL 3 TIMES DAILY
Qty: 90 TAB | Refills: 11 | Status: SHIPPED | OUTPATIENT
Start: 2019-06-19 | End: 2020-06-29 | Stop reason: SDUPTHER

## 2019-06-19 RX ORDER — METHIMAZOLE 5 MG/1
5 TABLET ORAL 3 TIMES DAILY
Qty: 90 TAB | Refills: 3 | Status: SHIPPED | OUTPATIENT
Start: 2019-06-19 | End: 2019-08-01

## 2019-06-19 NOTE — PROGRESS NOTES
"New Patient Consult Note  Referred by: JOSE MIGUEL Jose      HPI:  Sander Romero is a  23 y.o. old patient who comes in today for evaluation and management of the following:      Hyperthyroidism:   Started with symptoms of weight loss 40 lbs over 3 month, fever, diarrhea, skin itching, tremor, decrease in memory, elevated heart rate and dry eyes and redness. Intermittent symptoms of choking and swallowing. He is hungry     On Methimazole 5mg BID   Patient is compliant with medications      Mother: hypothyroidism    4/30/2019-  TSH 0.010, FT4 2.75, FT3 16.28, .6, AntiTG 4.3   WBC 5.5, Neutrophils - 43.50 (L), Glucose 133, ALT 56, Alk phos 235,     NM Thyroid uptake and Scan:   The planar scan demonstrates homogeneous labeling throughout the thyroid gland.  The five-hour uptake measurement equals 65.5%.    Thyroid Ultrasound: 5/2019  The thyroid gland is heterogeneous.  Vascularity is normal.    The right lobe of the thyroid gland measures 2.37 cm x 5.40 cm x 2.01 cm.  The left lobe of the thyroid gland measures 2.39 cm x 5.10 cm x 2.26 cm.  The isthmus measures 0.46 cm.    There are no thyroid nodules identified. There are multiple enlarged lymph nodes seen within the neck bilaterally. The largest on the right measures 3.1 x 1.7 x 0.9 cm in size and the largest on the left measures 3.6 x 1.6 x 0.9 cm in size.    Diabetes:   4/30/2019- A1c 6.6,  Patients diet has been \"bad\" secondary to symptoms of increased appetite re: hyperthyroidism  Patient is not currently on any medications.     Fatigue:   4/30/2019- B12 815,     Vitamin D   4/30/2019- Vitamin D 26- takes multivitamin   Compliant with vitamin regimen         ROS:  Constitutional: No weight loss or gain,  fever  HEENT: No difficulty with swallowing, change in voice, or swelling in throat area   Cardiac: No chest pain, palpitations, or racing heart  Resp: No shortness of breath  GI: No abdominal pain, nausea, vomiting, or diarrhea   Neuro: No " numbness or tinging in feet  Endo: No heat or cold intolerance, no polyuria or polydipsia  All other systems were reviewed and were negative.    Past Medical History:  Patient Active Problem List    Diagnosis Date Noted   • Obesity (BMI 35.0-39.9 without comorbidity) ([facility]) 05/23/2017   • Sepsis(995.91) 05/12/2017   • CAP (community acquired pneumonia) 05/12/2017   • Low HDL (under 40)    • Vitamin D deficiency    • Impaired fasting glucose    • Sleep apnea    • Undescended testicle    • Down syndrome        Past Surgical History:  Past Surgical History:   Procedure Laterality Date   • MYRINGOTOMY     • OTHER      surgery for undescended testies.   • TESTICLE EXPLORATION      undescended testicle   • TONSILLECTOMY     • TONSILLECTOMY AND ADENOIDECTOMY         Allergies:  Patient has no known allergies.    Social History:  Social History     Social History   • Marital status: Single     Spouse name: N/A   • Number of children: N/A   • Years of education: N/A     Occupational History   • Not on file.     Social History Main Topics   • Smoking status: Never Smoker   • Smokeless tobacco: Never Used   • Alcohol use 0.0 oz/week      Comment: rarely   • Drug use: No   • Sexual activity: Not on file     Other Topics Concern   • Not on file     Social History Narrative   • No narrative on file       Family History:  Family History   Problem Relation Age of Onset   • Heart Disease Mother         congenital heart defect   • Diabetes Maternal Grandfather    • Diabetes Paternal Grandmother    • Diabetes Paternal Grandfather        Medications:    Current Outpatient Prescriptions:   •  methimazole (TAPAZOLE) 5 MG Tab, Take 1 Tab by mouth 3 times a day., Disp: 90 Tab, Rfl: 3  •  propranolol (INDERAL) 10 MG Tab, Take 1 Tab by mouth 3 times a day., Disp: 90 Tab, Rfl: 11  •  therapeutic multivitamin-minerals (THERAGRAN-M) Tab, Take 1 Tab by mouth every day., Disp: 30 Tab, Rfl: 0  •  Cholecalciferol (VITAMIN D PO), Take 1 Cap by mouth  "every day., Disp: , Rfl:     Labs: Reviewed (as above)     Physical Examination:  Vital signs: /68 (BP Location: Left arm, Patient Position: Sitting, BP Cuff Size: Adult)   Pulse (!) 109   Ht 1.638 m (5' 4.5\")   Wt 82.1 kg (181 lb)   SpO2 96%   BMI 30.59 kg/m²  Body mass index is 30.59 kg/m².  General: No apparent distress, cooperative, no facial hair down syndrome     Eyes: No scleral icterus or discharge, no nystagmus  ENMT: Normal on external inspection of nose, lips, normal thyroid exam  Neck: No abnormal masses on inspection or palpations. no bruits noted   Resp: Normal effort, clear to auscultation bilaterally without wheezes, rales or rhonchi  CVS: Regular rate and rhythm, S1 S2 normal, no murmur, rubs or gallops    Extremities: No edema.   Abdomen: abdominal obesity present, No Hepatosplenomegaly, no striae   Neuro: Alert and oriented  Skin: No rash  Psych: Normal mood and affect, intact memory and able to make informed decisions    Assessment and Plan:  1. Hyperthyroidism  We have reviewed in great detail his labs, thyroid uptake and scan,       continue methimazole propanolol 10 mg 3 times a week    We discussed potential side effects with methimazole including agranulocytosis and hepatotoxicity.    We also discussed about long-term treatment options with regards to hyperthyroidism.  Side effects of the above medications were discussed in great detail including but not limited to: agranulocytosis, leukopenia, thrombocytopenia, aplastic anemia, hepatotoxicity, periarteritis, hyperprothrombinemia, nephritis, rash, vasculitis, nausea, vomiting, dyspepsia, arthralgia, alopecia, jaundice, edema, headache etc.     The patient and family members indicated understanding.     - methimazole (TAPAZOLE) 5 MG Tab; Take 1 Tab by mouth 3 times a day.  Dispense: 90 Tab; Refill: 3  - propranolol (INDERAL) 10 MG Tab; Take 1 Tab by mouth 3 times a day.  Dispense: 90 Tab; Refill: 11  - FREE THYROXINE; Standing  - " T3 FREE; Standing  - TSH; Standing  - TRIIDOTHYRONINE; Standing  - THYROTROPIN RECEP AB; Future    2. Obesity (BMI 35.0-39.9 without comorbidity) (HCC)  Discussed diet and exorcize (wter a    3. Vitamin D deficiency  Continue Vitamin D replacement     4. Type 2 diabetes mellitus without complication, without long-term current use of insulin (HCC)  Will continue to monitor   - ERVIN-65; Future  - C-PEPTIDE; Future      No Follow-up on file.     Thank you for allowing me to participate in the care of this patient.  If you have any questions or concerns please do not hesitate to contact me.    Christelle Murphy P.A.-C.  06/19/19    CC:   INGRIS Jose.    This note was created using voice recognition software (Dragon). The accuracy of the dictation is limited by the abilities of the software. I have reviewed the note prior to signing, however some errors in grammar and context are still possible. If you have any questions related to this note please do not hesitate to contact our office.

## 2019-06-20 LAB
C PEPTIDE SERPL-MCNC: 4.6 NG/ML (ref 0.8–3.5)
T3 SERPL-MCNC: 315.4 NG/DL (ref 60–181)
T3FREE SERPL-MCNC: 9.71 PG/ML (ref 2.4–4.2)
T4 FREE SERPL-MCNC: 2.65 NG/DL (ref 0.53–1.43)
TSH SERPL DL<=0.005 MIU/L-ACNC: 0.01 UIU/ML (ref 0.38–5.33)

## 2019-06-21 LAB
GAD65 AB SER IA-ACNC: <5 IU/ML (ref 0–5)
TSH RECEP AB SER-ACNC: 37.87 IU/L

## 2019-06-24 ENCOUNTER — TELEPHONE (OUTPATIENT)
Dept: ENDOCRINOLOGY | Facility: MEDICAL CENTER | Age: 24
End: 2019-06-24

## 2019-06-24 NOTE — TELEPHONE ENCOUNTER
----- Message from Christelle Murphy P.A.-C. sent at 6/23/2019 11:35 PM PDT -----  PLEASE call mom- tell her reviewed labs. Okay to increase dose of methimazole.   Christelle

## 2019-07-24 ENCOUNTER — TELEPHONE (OUTPATIENT)
Dept: SLEEP MEDICINE | Facility: MEDICAL CENTER | Age: 24
End: 2019-07-24

## 2019-07-24 DIAGNOSIS — H53.9 VISION CHANGES: ICD-10-CM

## 2019-07-24 NOTE — TELEPHONE ENCOUNTER
you referred my son Sander Bowden to a ophthalmologist but we can’t go without a referral. Could we get that sent to an ophthalmologist that will work under my hmo.         Referral pended, please sign.

## 2019-07-29 ENCOUNTER — HOSPITAL ENCOUNTER (OUTPATIENT)
Dept: LAB | Facility: MEDICAL CENTER | Age: 24
End: 2019-07-29
Attending: PHYSICIAN ASSISTANT
Payer: COMMERCIAL

## 2019-07-29 DIAGNOSIS — E05.90 HYPERTHYROIDISM: ICD-10-CM

## 2019-07-29 PROCEDURE — 84481 FREE ASSAY (FT-3): CPT

## 2019-07-29 PROCEDURE — 36415 COLL VENOUS BLD VENIPUNCTURE: CPT

## 2019-07-29 PROCEDURE — 84443 ASSAY THYROID STIM HORMONE: CPT

## 2019-07-29 PROCEDURE — 84480 ASSAY TRIIODOTHYRONINE (T3): CPT

## 2019-07-29 PROCEDURE — 84439 ASSAY OF FREE THYROXINE: CPT

## 2019-07-30 LAB
T3 SERPL-MCNC: 213.3 NG/DL (ref 60–181)
T3FREE SERPL-MCNC: 5.89 PG/ML (ref 2.4–4.2)
T4 FREE SERPL-MCNC: 1.42 NG/DL (ref 0.53–1.43)
TSH SERPL DL<=0.005 MIU/L-ACNC: <0.005 UIU/ML (ref 0.38–5.33)

## 2019-07-31 ENCOUNTER — OFFICE VISIT (OUTPATIENT)
Dept: ENDOCRINOLOGY | Facility: MEDICAL CENTER | Age: 24
End: 2019-07-31
Payer: COMMERCIAL

## 2019-07-31 VITALS
DIASTOLIC BLOOD PRESSURE: 60 MMHG | BODY MASS INDEX: 31.65 KG/M2 | WEIGHT: 190 LBS | SYSTOLIC BLOOD PRESSURE: 110 MMHG | HEART RATE: 89 BPM | HEIGHT: 65 IN | RESPIRATION RATE: 16 BRPM | OXYGEN SATURATION: 97 %

## 2019-07-31 DIAGNOSIS — E05.90 HYPERTHYROIDISM: ICD-10-CM

## 2019-07-31 DIAGNOSIS — E55.9 VITAMIN D DEFICIENCY: ICD-10-CM

## 2019-07-31 DIAGNOSIS — R53.83 FATIGUE, UNSPECIFIED TYPE: ICD-10-CM

## 2019-07-31 DIAGNOSIS — E11.9 TYPE 2 DIABETES MELLITUS WITHOUT COMPLICATION, WITHOUT LONG-TERM CURRENT USE OF INSULIN (HCC): ICD-10-CM

## 2019-07-31 PROCEDURE — 99214 OFFICE O/P EST MOD 30 MIN: CPT | Performed by: PHYSICIAN ASSISTANT

## 2019-07-31 NOTE — PROGRESS NOTES
New Patient Consult Note  Referred by: JOSE MIGUEL Jose      HPI:  Sander Romero is a  23 y.o. old patient who comes in today for evaluation and management of the following:      Hyperthyroidism:   Patient is still restless and hot all the time.   Patient is picking at eyes.Patient has appointment Aug 5 - eye doctor   Heart rate has improved        Methimazole 5mg TID    propanolol 10 mg 3 times a day    Mother: hypothyroidism    7/19- <0.005, FT4 1.42, T3 213, Ft3 5.89   6/19- TSH ,0.010, FT4 2.65, T3 315.FT4  4/30/2019-  TSH 0.010, FT4 2.75, FT3 16.28, .6, AntiTG 4.3   WBC 5.5, Neutrophils - 43.50 (L), Glucose 133, ALT 56, Alk phos 235,       Diabetes:   7/31/2019- 5.8 - improved from prior   Mother is still resticted diet     Fatigue:   4/30/2019- B12 815,     Vitamin D   Currently on Vitamin D supplment   4/30/2019- Vitamin D 26- takes multivitamin   Compliant with vitamin regimen         ROS:  Constitutional: No weight loss or gain,  fever  HEENT: No difficulty with swallowing, change in voice, or swelling in throat area   Cardiac: No chest pain, palpitations, or racing heart  Resp: No shortness of breath  GI: No abdominal pain, nausea, vomiting, or diarrhea   Neuro: No numbness or tinging in feet  Endo: No heat or cold intolerance, no polyuria or polydipsia  All other systems were reviewed and were negative.    Past Medical History:  Patient Active Problem List    Diagnosis Date Noted   • Obesity (BMI 35.0-39.9 without comorbidity) ([facility]) 05/23/2017   • Sepsis(995.91) 05/12/2017   • CAP (community acquired pneumonia) 05/12/2017   • Low HDL (under 40)    • Vitamin D deficiency    • Impaired fasting glucose    • Sleep apnea    • Undescended testicle    • Down syndrome        Past Surgical History:  Past Surgical History:   Procedure Laterality Date   • MYRINGOTOMY     • OTHER      surgery for undescended testies.   • TESTICLE EXPLORATION      undescended testicle   • TONSILLECTOMY     •  TONSILLECTOMY AND ADENOIDECTOMY         Allergies:  Patient has no known allergies.    Social History:  Social History     Socioeconomic History   • Marital status: Single     Spouse name: Not on file   • Number of children: Not on file   • Years of education: Not on file   • Highest education level: Not on file   Occupational History   • Not on file   Social Needs   • Financial resource strain: Not on file   • Food insecurity:     Worry: Not on file     Inability: Not on file   • Transportation needs:     Medical: Not on file     Non-medical: Not on file   Tobacco Use   • Smoking status: Never Smoker   • Smokeless tobacco: Never Used   Substance and Sexual Activity   • Alcohol use: Yes     Alcohol/week: 0.0 oz     Comment: rarely   • Drug use: No   • Sexual activity: Not on file   Lifestyle   • Physical activity:     Days per week: Not on file     Minutes per session: Not on file   • Stress: Not on file   Relationships   • Social connections:     Talks on phone: Not on file     Gets together: Not on file     Attends [employer] service: Not on file     Active member of club or organization: Not on file     Attends meetings of clubs or organizations: Not on file     Relationship status: Not on file   • Intimate partner violence:     Fear of current or ex partner: Not on file     Emotionally abused: Not on file     Physically abused: Not on file     Forced sexual activity: Not on file   Other Topics Concern   • Not on file   Social History Narrative   • Not on file       Family History:  Family History   Problem Relation Age of Onset   • Heart Disease Mother         congenital heart defect   • Diabetes Maternal Grandfather    • Diabetes Paternal Grandmother    • Diabetes Paternal Grandfather        Medications:    Current Outpatient Medications:   •  methimazole (TAPAZOLE) 5 MG Tab, Take 1 Tab by mouth 3 times a day., Disp: 90 Tab, Rfl: 3  •  propranolol (INDERAL) 10 MG Tab, Take 1 Tab by mouth 3 times a day., Disp: 90  "Tab, Rfl: 11  •  therapeutic multivitamin-minerals (THERAGRAN-M) Tab, Take 1 Tab by mouth every day., Disp: 30 Tab, Rfl: 0  •  Cholecalciferol (VITAMIN D PO), Take 1 Cap by mouth every day., Disp: , Rfl:     Labs: Reviewed (as above)     Physical Examination:  Vital signs: /60 (BP Location: Left arm, Patient Position: Sitting, BP Cuff Size: Adult)   Pulse 89   Resp 16   Ht 1.651 m (5' 5\")   Wt 86.2 kg (190 lb)   SpO2 97%   BMI 31.62 kg/m²  Body mass index is 31.62 kg/m².  General: No apparent distress, cooperative, sleeping, down syndrome   Eyes: No scleral icterus or discharge, no nystagmus  ENMT: Normal on external inspection of nose, lips, normal thyroid exam  Neck: No abnormal masses on inspection or palpations. no bruits noted   Resp: Normal effort, clear to auscultation bilaterally without wheezes, rales or rhonchi  CVS: Regular rate and rhythm, S1 S2 normal, no murmur, rubs or gallops    Abdomen: abdominal obesity present,   Neuro: Alert and oriented  Skin: No rash  Psych: Normal mood and affect, intact memory and able to make informed decisions    Assessment and Plan:  1. Hyperthyroidism   Methimazole 5mg 4 tablets a day - INCREASE     propanolol 10 mg 1-2 tablet 3 times a day as needed for symptoms     We discussed potential side effects with methimazole including agranulocytosis and hepatotoxicity.    We also discussed about long-term treatment options with regards to hyperthyroidism.  Side effects of the above medications were discussed in great detail including but not limited to: agranulocytosis, leukopenia, thrombocytopenia, aplastic anemia, hepatotoxicity, periarteritis, hyperprothrombinemia, nephritis, rash, vasculitis, nausea, vomiting, dyspepsia, arthralgia, alopecia, jaundice, edema, headache etc.     2. Obesity (BMI 35.0-39.9 without comorbidity) (HCC)  With continue with diet     3. Vitamin D deficiency  Continue Vitamin D replacement     4. Type 2 diabetes mellitus without " complication, without long-term current use of insulin (HCC)  Continue lifestyle regimen   Improved from prior     Return in about 4 weeks (around 8/28/2019).     Thank you for allowing me to participate in the care of this patient.  If you have any questions or concerns please do not hesitate to contact me.    Christelle Murphy P.A.-C.  06/19/19    CC:   INGRIS Jose.    This note was created using voice recognition software (Dragon). The accuracy of the dictation is limited by the abilities of the software. I have reviewed the note prior to signing, however some errors in grammar and context are still possible. If you have any questions related to this note please do not hesitate to contact our office.

## 2019-07-31 NOTE — PATIENT INSTRUCTIONS
1. Hyperthyroidism   Methimazole 5mg 4 tablets a day - INCREASE     propanolol 10 mg 1-2 tablet 3 times a day as needed for symptoms     We discussed potential side effects with methimazole including agranulocytosis and hepatotoxicity.    We also discussed about long-term treatment options with regards to hyperthyroidism.  Side effects of the above medications were discussed in great detail including but not limited to: agranulocytosis, leukopenia, thrombocytopenia, aplastic anemia, hepatotoxicity, periarteritis, hyperprothrombinemia, nephritis, rash, vasculitis, nausea, vomiting, dyspepsia, arthralgia, alopecia, jaundice, edema, headache etc.     2. Obesity (BMI 35.0-39.9 without comorbidity) (HCC)  With continue with diet     3. Vitamin D deficiency  Continue Vitamin D replacement     4. Type 2 diabetes mellitus without complication, without long-term current use of insulin (HCC)  Continue lifestyle regimen   Improved from prior

## 2019-08-01 RX ORDER — METHIMAZOLE 5 MG/1
5 TABLET ORAL 4 TIMES DAILY
Qty: 120 TAB | Refills: 3 | Status: SHIPPED | OUTPATIENT
Start: 2019-08-01 | End: 2019-08-01 | Stop reason: SDUPTHER

## 2019-08-01 RX ORDER — METHIMAZOLE 5 MG/1
5 TABLET ORAL 4 TIMES DAILY
Qty: 120 TAB | Refills: 3 | Status: SHIPPED | OUTPATIENT
Start: 2019-08-01 | End: 2019-10-22

## 2019-08-27 ENCOUNTER — APPOINTMENT (OUTPATIENT)
Dept: ENDOCRINOLOGY | Facility: MEDICAL CENTER | Age: 24
End: 2019-08-27
Payer: COMMERCIAL

## 2019-10-18 ENCOUNTER — HOSPITAL ENCOUNTER (OUTPATIENT)
Dept: LAB | Facility: MEDICAL CENTER | Age: 24
End: 2019-10-18
Attending: PHYSICIAN ASSISTANT
Payer: COMMERCIAL

## 2019-10-18 DIAGNOSIS — E05.90 HYPERTHYROIDISM: ICD-10-CM

## 2019-10-18 LAB
T3 SERPL-MCNC: 333.1 NG/DL (ref 60–181)
T3FREE SERPL-MCNC: 13 PG/ML (ref 2.4–4.2)
T4 FREE SERPL-MCNC: 3.09 NG/DL (ref 0.53–1.43)
TSH SERPL DL<=0.005 MIU/L-ACNC: <0.005 UIU/ML (ref 0.38–5.33)

## 2019-10-18 PROCEDURE — 84439 ASSAY OF FREE THYROXINE: CPT

## 2019-10-18 PROCEDURE — 84443 ASSAY THYROID STIM HORMONE: CPT

## 2019-10-18 PROCEDURE — 84481 FREE ASSAY (FT-3): CPT

## 2019-10-18 PROCEDURE — 36415 COLL VENOUS BLD VENIPUNCTURE: CPT

## 2019-10-18 PROCEDURE — 84480 ASSAY TRIIODOTHYRONINE (T3): CPT

## 2019-10-22 ENCOUNTER — OFFICE VISIT (OUTPATIENT)
Dept: ENDOCRINOLOGY | Facility: MEDICAL CENTER | Age: 24
End: 2019-10-22
Payer: COMMERCIAL

## 2019-10-22 VITALS
SYSTOLIC BLOOD PRESSURE: 100 MMHG | DIASTOLIC BLOOD PRESSURE: 70 MMHG | WEIGHT: 186 LBS | OXYGEN SATURATION: 98 % | HEIGHT: 65 IN | HEART RATE: 96 BPM | BODY MASS INDEX: 30.99 KG/M2

## 2019-10-22 DIAGNOSIS — E11.9 TYPE 2 DIABETES MELLITUS WITHOUT COMPLICATION, WITHOUT LONG-TERM CURRENT USE OF INSULIN (HCC): ICD-10-CM

## 2019-10-22 DIAGNOSIS — E66.9 OBESITY (BMI 35.0-39.9 WITHOUT COMORBIDITY): ICD-10-CM

## 2019-10-22 DIAGNOSIS — R73.01 IMPAIRED FASTING GLUCOSE: ICD-10-CM

## 2019-10-22 DIAGNOSIS — E05.90 HYPERTHYROIDISM: ICD-10-CM

## 2019-10-22 LAB
HBA1C MFR BLD: 6.5 % (ref 0–5.6)
INT CON NEG: NEGATIVE
INT CON POS: POSITIVE

## 2019-10-22 PROCEDURE — 83036 HEMOGLOBIN GLYCOSYLATED A1C: CPT | Performed by: PHYSICIAN ASSISTANT

## 2019-10-22 PROCEDURE — 99214 OFFICE O/P EST MOD 30 MIN: CPT | Performed by: PHYSICIAN ASSISTANT

## 2019-10-22 RX ORDER — METHIMAZOLE 10 MG/1
15 TABLET ORAL 2 TIMES DAILY
Qty: 60 TAB | Refills: 3 | Status: SHIPPED | OUTPATIENT
Start: 2019-10-22 | End: 2020-02-27

## 2019-10-22 NOTE — PROGRESS NOTES
Endocrine Consult Note  Referred by: INGRIS Jose.      HPI:  Sander Romero is a  24 y.o. old patient who comes in today for evaluation and management of the following:      Hyperthyroidism:   Patient is again nonverbal accompanied by his mother.  She states that he is still having symptoms of insomnia and difficulty sleeping at night.  He also continues to have an increase in appetite which she has been controlling with portion control.  She has not noticed any symptoms of diarrhea and/or tremor.  He has not complained of any increase in heart rate and or difficulty breathing.    He is up-to-date with his eye doctor.  Previously did we did increase his methimazole to 20 mg daily     Methimazole 5mg  4 tablets daily   propanolol 10 mg 3 times a day  Patient is compliant with medications  Denies any missed and/or skipped doses    Mother: hypothyroidism     Ref. Range 10/18/2019 06:41   TSH Latest Ref Range: 0.380 - 5.330 uIU/mL <0.005 (L)   Free T-4 Latest Ref Range: 0.53 - 1.43 ng/dL 3.09 (H)   T3 Latest Ref Range: 60.0 - 181.0 ng/dL 333.1 (H)   T3,Free Latest Ref Range: 2.40 - 4.20 pg/mL 13.00 (H)     7/19- <0.005, FT4 1.42, T3 213, Ft3 5.89   6/19- TSH ,0.010, FT4 2.65, T3 315.FT4  4/30/2019-  TSH 0.010, FT4 2.75, FT3 16.28, .6, AntiTG 4.3   WBC 5.5, Neutrophils - 43.50 (L), Glucose 133, ALT 56, Alk phos 235,       Diabetes:    10/22/2019 A1c 6.5-increased from prior.    7/31/2019- 5.8 - improved from prior   Mother is still resticted diet     Fatigue:   4/30/2019- B12 815,     Vitamin D   Currently on Vitamin D supplment   4/30/2019- Vitamin D 26- takes multivitamin   Compliant with vitamin regimen     ROS:  Constitutional: No weight loss or gain,  fever  HEENT: No difficulty with swallowing, change in voice, or swelling in throat area   Cardiac: No chest pain, palpitations, or racing heart  Resp: No shortness of breath  GI: No abdominal pain, nausea, vomiting, or diarrhea   Neuro: No  numbness or tinging in feet  Endo: No heat or cold intolerance, no polyuria or polydipsia  All other systems were reviewed and were negative.    Past Medical History:  Patient Active Problem List    Diagnosis Date Noted   • Obesity (BMI 35.0-39.9 without comorbidity) (formerly [facility]) 05/23/2017   • Sepsis(995.91) 05/12/2017   • CAP (community acquired pneumonia) 05/12/2017   • Low HDL (under 40)    • Vitamin D deficiency    • Impaired fasting glucose    • Sleep apnea    • Undescended testicle    • Down syndrome        Past Surgical History:  Past Surgical History:   Procedure Laterality Date   • MYRINGOTOMY     • OTHER      surgery for undescended testies.   • TESTICLE EXPLORATION      undescended testicle   • TONSILLECTOMY     • TONSILLECTOMY AND ADENOIDECTOMY         Allergies:  Patient has no known allergies.    Social History:  Social History     Socioeconomic History   • Marital status: Single     Spouse name: Not on file   • Number of children: Not on file   • Years of education: Not on file   • Highest education level: Not on file   Occupational History   • Not on file   Social Needs   • Financial resource strain: Not on file   • Food insecurity:     Worry: Not on file     Inability: Not on file   • Transportation needs:     Medical: Not on file     Non-medical: Not on file   Tobacco Use   • Smoking status: Never Smoker   • Smokeless tobacco: Never Used   Substance and Sexual Activity   • Alcohol use: Yes     Alcohol/week: 0.0 oz     Comment: rarely   • Drug use: No   • Sexual activity: Not on file   Lifestyle   • Physical activity:     Days per week: Not on file     Minutes per session: Not on file   • Stress: Not on file   Relationships   • Social connections:     Talks on phone: Not on file     Gets together: Not on file     Attends Orthodox service: Not on file     Active member of club or organization: Not on file     Attends meetings of clubs or organizations: Not on file     Relationship status: Not on file   •  "Intimate partner violence:     Fear of current or ex partner: Not on file     Emotionally abused: Not on file     Physically abused: Not on file     Forced sexual activity: Not on file   Other Topics Concern   • Not on file   Social History Narrative   • Not on file       Family History:  Family History   Problem Relation Age of Onset   • Heart Disease Mother         congenital heart defect   • Diabetes Maternal Grandfather    • Diabetes Paternal Grandmother    • Diabetes Paternal Grandfather        Medications:    Current Outpatient Medications:   •  methimazole (TAPAZOLE) 10 MG Tab, Take 1.5 Tabs by mouth 2 Times a Day., Disp: 60 Tab, Rfl: 3  •  propranolol (INDERAL) 10 MG Tab, Take 1 Tab by mouth 3 times a day., Disp: 90 Tab, Rfl: 11  •  therapeutic multivitamin-minerals (THERAGRAN-M) Tab, Take 1 Tab by mouth every day., Disp: 30 Tab, Rfl: 0  •  Cholecalciferol (VITAMIN D PO), Take 1 Cap by mouth every day., Disp: , Rfl:     Labs: Reviewed (as above)     Physical Examination:  Vital signs: /70 (BP Location: Right arm, Patient Position: Sitting, BP Cuff Size: Adult)   Pulse 96   Ht 1.651 m (5' 5\")   Wt 84.4 kg (186 lb)   SpO2 98%   BMI 30.95 kg/m²  Body mass index is 30.95 kg/m².  General: No apparent distress, cooperative, down syndrome appears more alert than previously  Eyes: No scleral icterus or discharge, no nystagmus  ENMT: Normal on external inspection of nose, lips, normal thyroid exam  Neck: No abnormal masses on inspection or palpations. no bruits noted   Resp: Normal effort, clear to auscultation bilaterally without wheezes, rales or rhonchi  CVS: Regular rate and rhythm, S1 S2 normal, no murmur, rubs or gallops    Neuro: Alert nonverbal  Skin: No rash he does have a well-healed abrasion on his right forearm      Assessment and Plan:  1. Hyperthyroidism   Increase: Methimazole 30mg daily    propanolol 10 mg 1-2 tablet 3 times a day as needed for symptoms     2. Obesity (BMI 35.0-39.9 without " comorbidity) (HCC)  With continue with diet     3. Vitamin D deficiency  Continue Vitamin D replacement     4. Type 2 diabetes mellitus without complication, without long-term current use of insulin (HCC)  Continue lifestyle regimen   Worsened from prior.  Will discuss potential starting metformin at his next evaluation to help with insulin resistance.    Return in about 5 weeks (around 11/26/2019).     Thank you for allowing me to participate in the care of this patient.  If you have any questions or concerns please do not hesitate to contact me.    Christelle Murphy P.A.-C.  06/19/19    CC:   INGRIS Jose.    This note was created using voice recognition software (Dragon). The accuracy of the dictation is limited by the abilities of the software. I have reviewed the note prior to signing, however some errors in grammar and context are still possible. If you have any questions related to this note please do not hesitate to contact our office.

## 2019-11-21 ENCOUNTER — OFFICE VISIT (OUTPATIENT)
Dept: URGENT CARE | Facility: MEDICAL CENTER | Age: 24
End: 2019-11-21
Payer: COMMERCIAL

## 2019-11-21 VITALS
BODY MASS INDEX: 31.49 KG/M2 | HEART RATE: 107 BPM | OXYGEN SATURATION: 94 % | TEMPERATURE: 101.4 F | HEIGHT: 65 IN | SYSTOLIC BLOOD PRESSURE: 106 MMHG | WEIGHT: 189 LBS | DIASTOLIC BLOOD PRESSURE: 54 MMHG

## 2019-11-21 DIAGNOSIS — R05.9 COUGH: ICD-10-CM

## 2019-11-21 DIAGNOSIS — J10.1 INFLUENZA B: ICD-10-CM

## 2019-11-21 DIAGNOSIS — Z87.01 HISTORY OF PNEUMONIA: ICD-10-CM

## 2019-11-21 DIAGNOSIS — R50.9 FEVER, UNSPECIFIED FEVER CAUSE: ICD-10-CM

## 2019-11-21 LAB
FLUAV+FLUBV AG SPEC QL IA: POSITIVE
INT CON NEG: NORMAL
INT CON POS: NORMAL

## 2019-11-21 PROCEDURE — 87804 INFLUENZA ASSAY W/OPTIC: CPT | Performed by: FAMILY MEDICINE

## 2019-11-21 PROCEDURE — 99214 OFFICE O/P EST MOD 30 MIN: CPT | Performed by: FAMILY MEDICINE

## 2019-11-21 RX ORDER — IBUPROFEN 200 MG
600 TABLET ORAL ONCE
Status: COMPLETED | OUTPATIENT
Start: 2019-11-21 | End: 2019-11-21

## 2019-11-21 RX ORDER — DOXYCYCLINE HYCLATE 100 MG
100 TABLET ORAL 2 TIMES DAILY
Qty: 14 TAB | Refills: 0 | Status: SHIPPED | OUTPATIENT
Start: 2019-11-21 | End: 2019-11-28

## 2019-11-21 RX ORDER — OSELTAMIVIR PHOSPHATE 75 MG/1
75 CAPSULE ORAL 2 TIMES DAILY
Qty: 10 CAP | Refills: 0 | Status: SHIPPED | OUTPATIENT
Start: 2019-11-21 | End: 2019-11-26

## 2019-11-21 RX ADMIN — Medication 600 MG: at 14:01

## 2019-11-21 ASSESSMENT — ENCOUNTER SYMPTOMS
MYALGIAS: 0
EYE DISCHARGE: 0
DIARRHEA: 1
WEIGHT LOSS: 0
VOMITING: 0
EYE REDNESS: 0
NAUSEA: 1

## 2019-11-21 NOTE — PROGRESS NOTES
"Subjective:      Sander Romero is a 24 y.o. male who presents with Sore Throat (loose stool, stomach pain, cough X1 radames )            1 day ST, productive cough without blood in sputum, fever tmax here 101.4. +wheeze. No respiratory distress. No PMH asthma. +PMH pneumonia once requiring hospitalization. No earache. No other aggravating or alleviating factors.          Review of Systems   Constitutional: Negative for malaise/fatigue and weight loss.   Eyes: Negative for discharge and redness.   Gastrointestinal: Positive for diarrhea (watery without blood. No infectious diarrhea risk factors.) and nausea. Negative for vomiting.   Musculoskeletal: Negative for joint pain and myalgias.   Skin: Negative for itching and rash.     .  Medications, Allergies, and current problem list reviewed today in Epic       Objective:     /54 (BP Location: Left arm, Patient Position: Sitting)   Pulse (!) 107   Temp (!) 38.6 °C (101.4 °F) (Temporal)   Ht 1.651 m (5' 5\")   Wt 85.7 kg (189 lb)   SpO2 94%   BMI 31.45 kg/m²      Physical Exam  Constitutional:       General: He is not in acute distress.     Appearance: He is well-developed.   HENT:      Head: Normocephalic and atraumatic.      Right Ear: Tympanic membrane normal.      Left Ear: Tympanic membrane normal.   Eyes:      Conjunctiva/sclera: Conjunctivae normal.   Cardiovascular:      Rate and Rhythm: Normal rate and regular rhythm.      Heart sounds: Normal heart sounds. No murmur.   Pulmonary:      Effort: Pulmonary effort is normal.      Breath sounds: Normal breath sounds. No wheezing.   Skin:     General: Skin is warm and dry.      Findings: No rash.   Neurological:      Mental Status: He is alert and oriented to person, place, and time.                 Assessment/Plan:       "

## 2019-12-03 ENCOUNTER — HOSPITAL ENCOUNTER (OUTPATIENT)
Dept: LAB | Facility: MEDICAL CENTER | Age: 24
End: 2019-12-03
Attending: PHYSICIAN ASSISTANT
Payer: COMMERCIAL

## 2019-12-03 DIAGNOSIS — E05.90 HYPERTHYROIDISM: ICD-10-CM

## 2019-12-03 LAB
T3 SERPL-MCNC: 208.5 NG/DL (ref 60–181)
T3FREE SERPL-MCNC: 6.28 PG/ML (ref 2.4–4.2)
T4 FREE SERPL-MCNC: 2.44 NG/DL (ref 0.53–1.43)
TSH SERPL DL<=0.005 MIU/L-ACNC: 0.01 UIU/ML (ref 0.38–5.33)

## 2019-12-03 PROCEDURE — 84443 ASSAY THYROID STIM HORMONE: CPT

## 2019-12-03 PROCEDURE — 84481 FREE ASSAY (FT-3): CPT

## 2019-12-03 PROCEDURE — 84439 ASSAY OF FREE THYROXINE: CPT

## 2019-12-03 PROCEDURE — 36415 COLL VENOUS BLD VENIPUNCTURE: CPT

## 2019-12-03 PROCEDURE — 84480 ASSAY TRIIODOTHYRONINE (T3): CPT

## 2019-12-05 ENCOUNTER — OFFICE VISIT (OUTPATIENT)
Dept: ENDOCRINOLOGY | Facility: MEDICAL CENTER | Age: 24
End: 2019-12-05
Payer: COMMERCIAL

## 2019-12-05 VITALS
HEART RATE: 101 BPM | WEIGHT: 188 LBS | DIASTOLIC BLOOD PRESSURE: 56 MMHG | SYSTOLIC BLOOD PRESSURE: 112 MMHG | OXYGEN SATURATION: 96 % | BODY MASS INDEX: 31.28 KG/M2

## 2019-12-05 DIAGNOSIS — E11.9 TYPE 2 DIABETES MELLITUS WITHOUT COMPLICATION, WITHOUT LONG-TERM CURRENT USE OF INSULIN (HCC): ICD-10-CM

## 2019-12-05 PROCEDURE — 99213 OFFICE O/P EST LOW 20 MIN: CPT | Performed by: PHYSICIAN ASSISTANT

## 2019-12-05 RX ORDER — METFORMIN HYDROCHLORIDE 500 MG/1
500 TABLET, EXTENDED RELEASE ORAL DAILY
Qty: 30 TAB | Refills: 3 | Status: SHIPPED | OUTPATIENT
Start: 2019-12-05 | End: 2020-01-16

## 2019-12-05 NOTE — PROGRESS NOTES
Endocrine Consult Note  Referred by: JOSE MIGUEL Jose      HPI:  Sander Romero is a  24 y.o. old patient who comes in today for evaluation and management of the following:      He is accompanied by his mother.     Hyperthyroidism:   Patient is again nonverbal accompanied by his mother.  She states that his symptoms are similar to prior. She has noticed any change.     Patient continues with symptoms of insomnia and difficulty sleeping at night.  He also continues to have an increase in appetite which she has been controlling with portion control.  She has not noticed any symptoms of diarrhea and/or tremor.  He has not complained of any increase in heart rate and or difficulty breathing.    He is up-to-date with his eye doctor.     Methimazole 30 mg daily    propanolol 10 mg 3 times a day  Patient is compliant with medications  Denies any missed and/or skipped doses    Mother: hypothyroidism     Ref. Range 7/29/2019 08:19 10/18/2019 06:41 12/3/2019 07:32   TSH Latest Ref Range: 0.380 - 5.330 uIU/mL <0.005 (L) <0.005 (L) 0.010 (L)   Free T-4 Latest Ref Range: 0.53 - 1.43 ng/dL 1.42 3.09 (H) 2.44 (H)   T3 Latest Ref Range: 60.0 - 181.0 ng/dL 213.3 (H) 333.1 (H) 208.5 (H)   T3,Free Latest Ref Range: 2.40 - 4.20 pg/mL 5.89 (H) 13.00 (H) 6.28 (H)     7/19- <0.005, FT4 1.42, T3 213, Ft3 5.89   6/19- TSH ,0.010, FT4 2.65, T3 315.FT4  4/30/19-  TSH 0.010, FT4 2.75, FT3 16.28, .6, AntiTG 4.3   WBC 5.5, Neutrophils - 43.50 (L), Glucose 133, ALT 56, Alk phos 235,       Diabetes:    12/5/2019- started on Metformin   10/22/2019 A1c 6.5-increased from prior.    7/31/2019- 5.8 - improved from prior   Mother is still resticted diet     Fatigue:   4/30/2019- B12 815,     Vitamin D   Currently on Vitamin D supplment   4/30/2019- Vitamin D 26- takes multivitamin   Compliant with vitamin regimen     ROS:  Constitutional: No weight loss or gain,  fever  HEENT: No difficulty with swallowing, change in voice, or  swelling in throat area   Cardiac: No chest pain, palpitations, or racing heart  Resp: No shortness of breath  GI: No abdominal pain, nausea, vomiting, or diarrhea   Neuro: No numbness or tinging in feet  Endo: No heat or cold intolerance, no polyuria or polydipsia  All other systems were reviewed and were negative.    Past Medical History:  Patient Active Problem List    Diagnosis Date Noted   • Type 2 diabetes mellitus without complication, without long-term current use of insulin ([facility]) 10/22/2019   • Obesity (BMI 35.0-39.9 without comorbidity) ([facility]) 05/23/2017   • Sepsis(995.91) 05/12/2017   • CAP (community acquired pneumonia) 05/12/2017   • Low HDL (under 40)    • Vitamin D deficiency    • Impaired fasting glucose    • Sleep apnea    • Undescended testicle    • Down syndrome        Past Surgical History:  Past Surgical History:   Procedure Laterality Date   • MYRINGOTOMY     • OTHER      surgery for undescended testies.   • TESTICLE EXPLORATION      undescended testicle   • TONSILLECTOMY     • TONSILLECTOMY AND ADENOIDECTOMY         Allergies:  Patient has no known allergies.    Social History:  Social History     Socioeconomic History   • Marital status: Single     Spouse name: Not on file   • Number of children: Not on file   • Years of education: Not on file   • Highest education level: Not on file   Occupational History   • Not on file   Social Needs   • Financial resource strain: Not on file   • Food insecurity:     Worry: Not on file     Inability: Not on file   • Transportation needs:     Medical: Not on file     Non-medical: Not on file   Tobacco Use   • Smoking status: Never Smoker   • Smokeless tobacco: Never Used   Substance and Sexual Activity   • Alcohol use: Yes     Alcohol/week: 0.0 oz     Comment: rarely   • Drug use: No   • Sexual activity: Not on file   Lifestyle   • Physical activity:     Days per week: Not on file     Minutes per session: Not on file   • Stress: Not on file   Relationships    • Social connections:     Talks on phone: Not on file     Gets together: Not on file     Attends Mandaen service: Not on file     Active member of club or organization: Not on file     Attends meetings of clubs or organizations: Not on file     Relationship status: Not on file   • Intimate partner violence:     Fear of current or ex partner: Not on file     Emotionally abused: Not on file     Physically abused: Not on file     Forced sexual activity: Not on file   Other Topics Concern   • Not on file   Social History Narrative   • Not on file       Family History:  Family History   Problem Relation Age of Onset   • Heart Disease Mother         congenital heart defect   • Diabetes Maternal Grandfather    • Diabetes Paternal Grandmother    • Diabetes Paternal Grandfather        Medications:    Current Outpatient Medications:   •  methimazole (TAPAZOLE) 10 MG Tab, Take 1.5 Tabs by mouth 2 Times a Day., Disp: 60 Tab, Rfl: 3  •  propranolol (INDERAL) 10 MG Tab, Take 1 Tab by mouth 3 times a day., Disp: 90 Tab, Rfl: 11  •  therapeutic multivitamin-minerals (THERAGRAN-M) Tab, Take 1 Tab by mouth every day., Disp: 30 Tab, Rfl: 0  •  Cholecalciferol (VITAMIN D PO), Take 1 Cap by mouth every day., Disp: , Rfl:     Labs: Reviewed (as above)     Physical Examination:  Vital signs: /56 (BP Location: Left arm, Patient Position: Sitting, BP Cuff Size: Adult)   Pulse (!) 101   Wt 85.3 kg (188 lb)   SpO2 96%   BMI 31.28 kg/m²  Body mass index is 31.28 kg/m².  General: No apparent distress, cooperative, down syndrome appears more alert than previously, nice hair cut   Eyes: No scleral icterus or discharge, no nystagmus  ENMT: Normal on external inspection of nose, lips, normal thyroid exam  Neck: No abnormal masses on inspection or palpations. no bruits noted   Resp: Normal effort, clear to auscultation bilaterally without wheezes, rales or rhonchi  CVS: Regular rate and rhythm, S1 S2 normal, no murmur, rubs or gallops     Neuro: Alert nonverbal    Assessment and Plan:  1. Hyperthyroidism   Continue:  Methimazole 30mg daily    propanolol 10 mg 1-2 tablet 3 times a day as needed for symptoms   Recheck labs in 4 weeks and 6 weeks f/u     -standing labs     2. Obesity (BMI 35.0-39.9 without comorbidity) (HCC)  With continue with diet     3. Vitamin D deficiency  Continue Vitamin D replacement     4. Type 2 diabetes mellitus without complication, without long-term current use of insulin (HCC)  Continue lifestyle regimen   Will start low dose Metformin     - discussed and reviewed side effects     Return in about 6 weeks (around 1/16/2020).     Thank you for allowing me to participate in the care of this patient.  If you have any questions or concerns please do not hesitate to contact me.    Christelle Murphy P.A.-C.  06/19/19    CC:   INGRIS Jose.    This note was created using voice recognition software (Dragon). The accuracy of the dictation is limited by the abilities of the software. I have reviewed the note prior to signing, however some errors in grammar and context are still possible. If you have any questions related to this note please do not hesitate to contact our office.

## 2020-01-11 DIAGNOSIS — E05.90 HYPERTHYROIDISM: ICD-10-CM

## 2020-01-13 ENCOUNTER — HOSPITAL ENCOUNTER (OUTPATIENT)
Dept: LAB | Facility: MEDICAL CENTER | Age: 25
End: 2020-01-13
Attending: PHYSICIAN ASSISTANT
Payer: COMMERCIAL

## 2020-01-13 DIAGNOSIS — E05.90 HYPERTHYROIDISM: ICD-10-CM

## 2020-01-13 LAB
T3 SERPL-MCNC: 523 NG/DL (ref 60–181)
T3FREE SERPL-MCNC: 20.3 PG/ML (ref 2.4–4.2)
T4 FREE SERPL-MCNC: 3.08 NG/DL (ref 0.53–1.43)
TSH SERPL DL<=0.005 MIU/L-ACNC: <0.005 UIU/ML (ref 0.38–5.33)

## 2020-01-13 PROCEDURE — 36415 COLL VENOUS BLD VENIPUNCTURE: CPT

## 2020-01-13 PROCEDURE — 84443 ASSAY THYROID STIM HORMONE: CPT

## 2020-01-13 PROCEDURE — 84481 FREE ASSAY (FT-3): CPT

## 2020-01-13 PROCEDURE — 84480 ASSAY TRIIODOTHYRONINE (T3): CPT

## 2020-01-13 PROCEDURE — 84439 ASSAY OF FREE THYROXINE: CPT

## 2020-01-13 RX ORDER — METHIMAZOLE 5 MG/1
TABLET ORAL
Qty: 120 TAB | Refills: 0 | Status: SHIPPED
Start: 2020-01-13 | End: 2020-01-16

## 2020-01-13 NOTE — TELEPHONE ENCOUNTER
Was the patient seen in the last year in this department? Yes    Does patient have an active prescription for medications requested? Yes    Received Request Via: Pharmacy     methimazole (TAPAZOLE) 5 MG Tab       Sig: TAKE 1 TABLET BY MOUTH 4 TIMES DAILY

## 2020-01-16 ENCOUNTER — HOSPITAL ENCOUNTER (OUTPATIENT)
Dept: LAB | Facility: MEDICAL CENTER | Age: 25
End: 2020-01-16
Attending: INTERNAL MEDICINE
Payer: COMMERCIAL

## 2020-01-16 ENCOUNTER — OFFICE VISIT (OUTPATIENT)
Dept: ENDOCRINOLOGY | Facility: MEDICAL CENTER | Age: 25
End: 2020-01-16
Payer: COMMERCIAL

## 2020-01-16 VITALS
OXYGEN SATURATION: 98 % | DIASTOLIC BLOOD PRESSURE: 58 MMHG | WEIGHT: 193 LBS | BODY MASS INDEX: 32.15 KG/M2 | HEIGHT: 65 IN | SYSTOLIC BLOOD PRESSURE: 106 MMHG | HEART RATE: 127 BPM

## 2020-01-16 DIAGNOSIS — E11.9 TYPE 2 DIABETES MELLITUS WITHOUT COMPLICATION, WITHOUT LONG-TERM CURRENT USE OF INSULIN (HCC): ICD-10-CM

## 2020-01-16 DIAGNOSIS — E05.00 GRAVES DISEASE: ICD-10-CM

## 2020-01-16 DIAGNOSIS — E05.90 HYPERTHYROIDISM: ICD-10-CM

## 2020-01-16 DIAGNOSIS — Z79.899 HIGH RISK MEDICATION USE: ICD-10-CM

## 2020-01-16 LAB
ALBUMIN SERPL BCP-MCNC: 3.5 G/DL (ref 3.2–4.9)
ALBUMIN/GLOB SERPL: 0.8 G/DL
ALP SERPL-CCNC: 228 U/L (ref 30–99)
ALT SERPL-CCNC: 57 U/L (ref 2–50)
ANION GAP SERPL CALC-SCNC: 12 MMOL/L (ref 0–11.9)
AST SERPL-CCNC: 31 U/L (ref 12–45)
BILIRUB SERPL-MCNC: 0.7 MG/DL (ref 0.1–1.5)
BUN SERPL-MCNC: 14 MG/DL (ref 8–22)
CALCIUM SERPL-MCNC: 9.4 MG/DL (ref 8.5–10.5)
CHLORIDE SERPL-SCNC: 101 MMOL/L (ref 96–112)
CO2 SERPL-SCNC: 23 MMOL/L (ref 20–33)
CREAT SERPL-MCNC: 0.54 MG/DL (ref 0.5–1.4)
CREAT UR-MCNC: 151.5 MG/DL
GLOBULIN SER CALC-MCNC: 4.2 G/DL (ref 1.9–3.5)
GLUCOSE SERPL-MCNC: 147 MG/DL (ref 65–99)
HBA1C MFR BLD: 6.7 % (ref 0–5.6)
INT CON NEG: NEGATIVE
INT CON POS: POSITIVE
MICROALBUMIN UR-MCNC: 1.5 MG/DL
MICROALBUMIN/CREAT UR: 10 MG/G (ref 0–30)
POTASSIUM SERPL-SCNC: 4.1 MMOL/L (ref 3.6–5.5)
PROT SERPL-MCNC: 7.7 G/DL (ref 6–8.2)
SODIUM SERPL-SCNC: 136 MMOL/L (ref 135–145)

## 2020-01-16 PROCEDURE — 82043 UR ALBUMIN QUANTITATIVE: CPT

## 2020-01-16 PROCEDURE — 36415 COLL VENOUS BLD VENIPUNCTURE: CPT

## 2020-01-16 PROCEDURE — 99214 OFFICE O/P EST MOD 30 MIN: CPT | Performed by: INTERNAL MEDICINE

## 2020-01-16 PROCEDURE — 83036 HEMOGLOBIN GLYCOSYLATED A1C: CPT | Performed by: INTERNAL MEDICINE

## 2020-01-16 PROCEDURE — 82570 ASSAY OF URINE CREATININE: CPT

## 2020-01-16 PROCEDURE — 84445 ASSAY OF TSI GLOBULIN: CPT

## 2020-01-16 PROCEDURE — 80053 COMPREHEN METABOLIC PANEL: CPT

## 2020-01-16 PROCEDURE — 84681 ASSAY OF C-PEPTIDE: CPT

## 2020-01-16 PROCEDURE — 86341 ISLET CELL ANTIBODY: CPT

## 2020-01-16 RX ORDER — METFORMIN HYDROCHLORIDE 500 MG/1
1000 TABLET, EXTENDED RELEASE ORAL DAILY
Qty: 60 TAB | Refills: 3 | Status: SHIPPED | OUTPATIENT
Start: 2020-01-16 | End: 2020-05-18

## 2020-01-16 RX ORDER — METHIMAZOLE 10 MG/1
20 TABLET ORAL DAILY
Qty: 60 TAB | Refills: 1 | Status: SHIPPED | OUTPATIENT
Start: 2020-01-16 | End: 2020-02-27 | Stop reason: SDUPTHER

## 2020-01-17 NOTE — PROGRESS NOTES
Chief Complaint: Follow up for Hyperthyroidism secondary to Grave's disease and type 2 diabetes and history of Down syndrome    HPI:     Sander Romero is a 24 y.o. male here for follow up of of the above medical issues.   She was previously a patient of KATHERINE Brush and is my first time seeing her today.    He has a history of uncontrolled Graves' hyperthyroidism with a history of medical noncompliance with methimazole therapy complicated by the fact that he has Down syndrome and he does not like taking medications    His hyperthyroidism was first diagnosed in April 2019 with a positive thyroid uptake and scan from May 2019 showing increased 5-hour uptake of 65%      On follow-up the patient's mother admits that the patient has not been taking methimazole over the past week because he lost his medication.    He denies unexplained weight loss but has palpitations, tremors, baseline resting tachycardia while sitting, heat intolerance but no diarrhea.   He denies having any vision changes, eye pain eyelid redness and eyelid swelling or pain with eye fixation.      His most recent labs on January 13, 2020 show an uncontrolled and suppressed TSH of less than 0.005 with a frankly elevated free T4 of 3.08 and frankly elevated free T3 of 20.30    Previously on December 2019 while taking methimazole 10 mg daily his labs were better and free T4 was down to 2.4 and free T3 was down to 6.28    The patient's mother admitted that she did not understand the patient's medical problem and she thought that it was okay for the patient to miss her medications.      Patient also has type 2 diabetes initially discovered in August 2018.  Baseline A1c has been elevated at 6.7%.  His last A1c was stable at 6.7% on January 16, 2020.    He was started on metformin by KATHERINE Brush but the patient did not understand the indication and the patient's mother did not understand the indication for the medication and so they  stopped taking it.    He currently denies hyperglycemic symptoms such as polyuria, polydipsia and polyphagia as well as blurry vision.  The patient is morbidly obese however because of intake of excess calories and Down syndrome    He has not had a formal eye exam but the patient does admit to blurry vision.  His last urine microalbumin creatinine ratio was normal back in April 2019.    Last lipid profile showed baseline LDL of 85 and triglycerides of 151 with a normal total cholesterol he is not on a statin because of his young age.    The patient's mother is unaware if the patient has any history of congenital heart disease which is common with Down syndrome.      Patient's medications, allergies, and social histories were reviewed and updated as appropriate.      ROS:     CONS:     No fever, no chills   EYES:     No diplopia, no blurry vision   CV:           No chest pain, no palpitations   PULM:     No SOB, no cough, no hemoptysis.   GI:            No nausea, no vomiting, no diarrhea, no constipation   ENDO:     No polyuria, no polydipsia, no heat intolerance, no cold intolerance       Past Medical History:  Problem List:  2020-01: Hyperthyroidism  2019-10: Type 2 diabetes mellitus without complication, without long-  term current use of insulin ([facility])  2017-05: Obesity (BMI 35.0-39.9 without comorbidity) ([facility])  2017-05: Sepsis(995.91)  2017-05: CAP (community acquired pneumonia)  Down syndrome  Low HDL (under 40)  Vitamin D deficiency  Impaired fasting glucose  Sleep apnea  Undescended testicle      Past Surgical History:  Past Surgical History:   Procedure Laterality Date   • MYRINGOTOMY     • OTHER      surgery for undescended testies.   • TESTICLE EXPLORATION      undescended testicle   • TONSILLECTOMY     • TONSILLECTOMY AND ADENOIDECTOMY          Allergies:  Patient has no known allergies.     Social History:  Social History     Tobacco Use   • Smoking status: Never Smoker   • Smokeless tobacco: Never Used  "  Substance Use Topics   • Alcohol use: Yes     Alcohol/week: 0.0 oz     Comment: rarely   • Drug use: No        Family History:   family history includes Diabetes in his maternal grandfather, paternal grandfather, and paternal grandmother; Heart Disease in his mother.      PHYSICAL EXAM:   Vital signs: /58 (BP Location: Left arm, Patient Position: Sitting)   Pulse (!) 127   Ht 1.651 m (5' 5\")   Wt 87.5 kg (193 lb)   SpO2 98%   BMI 32.12 kg/m²   GENERAL: Obese young gentleman with Down syndrome in no apparent distress.   EYE:  No ocular asymmetry, PERRLA, No exophthalmos or lid lag  HENT: Pink, moist mucous membranes.    NECK: No thyromegaly.   CARDIOVASCULAR:  No murmurs  LUNGS: Clear breath sounds  ABDOMEN: Soft, nontender   EXTREMITIES: No clubbing, cyanosis, or edema.   NEUROLOGICAL: No gross focal motor abnormalities, there is visible tremors with both hands  LYMPH: No cervical adenopathy palpated.   SKIN: No rashes, lesions.   Monofilament testing with a 10 gram force: sensation: intact bilaterally  Visual Inspection: Feet without maceration, ulcers, or fissures.  Pedal pulses: intact bilaterally      Labs:  Lab Results   Component Value Date/Time    WBC 5.5 04/30/2019 11:38 AM    RBC 5.14 04/30/2019 11:38 AM    HEMOGLOBIN 15.8 04/30/2019 11:38 AM    MCV 92.2 04/30/2019 11:38 AM    MCH 30.7 04/30/2019 11:38 AM    MCHC 33.3 (L) 04/30/2019 11:38 AM    RDW 46.1 04/30/2019 11:38 AM    MPV 8.9 (L) 07/05/2017 11:46 AM       Lab Results   Component Value Date/Time    SODIUM 134 (L) 04/30/2019 11:38 AM    POTASSIUM 4.2 04/30/2019 11:38 AM    CHLORIDE 102 04/30/2019 11:38 AM    CO2 26 04/30/2019 11:38 AM    ANION 6.0 04/30/2019 11:38 AM    GLUCOSE 133 (H) 04/30/2019 11:38 AM    BUN 12 04/30/2019 11:38 AM    CREATININE 0.40 (L) 04/30/2019 11:38 AM    CALCIUM 10.0 04/30/2019 11:38 AM    ASTSGOT 33 05/03/2019 03:30 PM    ALTSGPT 45 05/03/2019 03:30 PM    TBILIRUBIN 0.8 05/03/2019 03:30 PM    ALBUMIN 3.5 " 05/03/2019 03:30 PM    ALBUMIN 3.93 12/07/2015 09:38 AM    TOTPROTEIN 7.4 05/03/2019 03:30 PM    TOTPROTEIN 8.00 12/07/2015 09:38 AM    GLOBULIN 3.6 (H) 04/30/2019 11:38 AM    AGRATIO 0.9 04/30/2019 11:38 AM       Lab Results   Component Value Date/Time    TSHULTRASEN <0.005 (L) 01/13/2020 0850     Lab Results   Component Value Date/Time    FREET4 3.08 (H) 01/13/2020 0850     Lab Results   Component Value Date/Time    FREET3 20.30 (H) 01/13/2020 0850     No results found for: THYSTIMIG      Imaging: Please refer to thyroid uptake and scan from May 2019      ASSESSMENT/PLAN:     1. Hyperthyroidism  Uncontrolled secondary to noncompliance   Patient is noncompliant because he has Down syndrome and is mentally delayed    Discussed the diagnosis of hyperthyroidism secondary to Graves' disease  Reviewed its pathogenesis  Reviewed the therapeutic options which includes medical therapy, radioactive iodine therapy, and surgery    Discussed with parents and siblings the importance of compliance with antithyroid drug therapy or methimazole therapy  Reviewed the risks of uncontrolled hyperthyroidism which include atrial fibrillation, stroke and even death    I am starting him back on methimazole at 20 mg once a day.  I am not dividing his medications because of compliance related issues.    We will plan for follow-up in 4 to 6 weeks with a repeat of his TSH, free T4 and free T3 level      2. Graves disease  This is the etiology of his hyperthyroidism    3. Type 2 diabetes mellitus without complication, without long-term current use of insulin (HCC)  Fairly controlled  A1c stable at 6.7%  Discussed the increased risk of incidence with diabetes in Down syndrome  Recommend starting metformin extended release 500 mg 2 pills daily to help improve compliance and reduce pill burden  Recommend annual dilated eye exam  Foot exam was completed today  I am checking a urine microalbumin creatinine ratio today  We will plan to repeat A1c in  3 months    4. High risk medication use  Patient is taking methimazole which is a high risk medication      Return in about 6 weeks (around 2/27/2020).      This patient during there office visit today was started on a new medication.  Side effects of the new medication were discussed with the patient today in the office.     Thank you kindly for allowing me to participate in the thyroid care plan for this patient.    David Kessler MD, [facility], [facility]  01/16/20    CC:   JOSE MIGUEL Jose

## 2020-01-18 LAB — C PEPTIDE SERPL-MCNC: 4.5 NG/ML (ref 0.8–3.5)

## 2020-01-20 LAB — GAD65 AB SER IA-ACNC: <5 IU/ML (ref 0–5)

## 2020-01-22 ENCOUNTER — TELEPHONE (OUTPATIENT)
Dept: ENDOCRINOLOGY | Facility: MEDICAL CENTER | Age: 25
End: 2020-01-22

## 2020-01-22 LAB — TSI ACT/NOR SER: 318 %

## 2020-01-22 NOTE — TELEPHONE ENCOUNTER
----- Message from David Kessler M.D. sent at 1/22/2020  8:24 AM PST -----  To Ms. Romero    The blood work for Sander confirms that he has hyperthyroidism from Graves' disease    He really needs to take the methimazole 20mg  Everyday and must not miss it so that his overactive thyroid gets controlled    Unfortunately this condition may be permanent and this is the best medication for him    Please also have Sander take the diabetes medication metformin to help control his sugars    Please remember to get labs done prior to your next visit so we can discuss them face to face     Thank you    Dr. Kessler

## 2020-01-22 NOTE — TELEPHONE ENCOUNTER
Phone Number Called: [phone] (home)       Call outcome: left message for patient to call back regarding message below    Message: LM for patient cristel Mayo to call back regarding test results.

## 2020-02-25 ENCOUNTER — HOSPITAL ENCOUNTER (OUTPATIENT)
Dept: LAB | Facility: MEDICAL CENTER | Age: 25
End: 2020-02-25
Attending: INTERNAL MEDICINE
Payer: COMMERCIAL

## 2020-02-25 DIAGNOSIS — E05.90 HYPERTHYROIDISM: ICD-10-CM

## 2020-02-25 DIAGNOSIS — E11.9 TYPE 2 DIABETES MELLITUS WITHOUT COMPLICATION, WITHOUT LONG-TERM CURRENT USE OF INSULIN (HCC): ICD-10-CM

## 2020-02-25 LAB
T3FREE SERPL-MCNC: 8.03 PG/ML (ref 2.4–4.2)
T4 FREE SERPL-MCNC: 1.81 NG/DL (ref 0.53–1.43)
TSH SERPL DL<=0.005 MIU/L-ACNC: <0.005 UIU/ML (ref 0.38–5.33)

## 2020-02-25 PROCEDURE — 84443 ASSAY THYROID STIM HORMONE: CPT

## 2020-02-25 PROCEDURE — 36415 COLL VENOUS BLD VENIPUNCTURE: CPT

## 2020-02-25 PROCEDURE — 84439 ASSAY OF FREE THYROXINE: CPT

## 2020-02-25 PROCEDURE — 84481 FREE ASSAY (FT-3): CPT

## 2020-02-27 ENCOUNTER — OFFICE VISIT (OUTPATIENT)
Dept: ENDOCRINOLOGY | Facility: MEDICAL CENTER | Age: 25
End: 2020-02-27
Payer: COMMERCIAL

## 2020-02-27 VITALS
WEIGHT: 191.2 LBS | BODY MASS INDEX: 31.86 KG/M2 | SYSTOLIC BLOOD PRESSURE: 112 MMHG | HEIGHT: 65 IN | HEART RATE: 120 BPM | DIASTOLIC BLOOD PRESSURE: 58 MMHG | OXYGEN SATURATION: 96 %

## 2020-02-27 DIAGNOSIS — E05.90 HYPERTHYROIDISM: ICD-10-CM

## 2020-02-27 DIAGNOSIS — E05.00 GRAVES DISEASE: ICD-10-CM

## 2020-02-27 DIAGNOSIS — Z79.899 HIGH RISK MEDICATION USE: ICD-10-CM

## 2020-02-27 DIAGNOSIS — E11.9 TYPE 2 DIABETES MELLITUS WITHOUT COMPLICATION, WITHOUT LONG-TERM CURRENT USE OF INSULIN (HCC): ICD-10-CM

## 2020-02-27 PROCEDURE — 99214 OFFICE O/P EST MOD 30 MIN: CPT | Performed by: INTERNAL MEDICINE

## 2020-02-27 RX ORDER — METHIMAZOLE 10 MG/1
20 TABLET ORAL DAILY
Qty: 60 TAB | Refills: 1 | Status: SHIPPED | OUTPATIENT
Start: 2020-02-27 | End: 2020-04-09 | Stop reason: SDUPTHER

## 2020-02-28 NOTE — PATIENT INSTRUCTIONS
Notify me for symptoms of hyperthyroidism including heat intolerance, fatigue, weight loss, heart racing, palpitations, anxiety, or tremor.  Avoid strenuous activity, caffeine, and decongestant medications until your thyroid function is well controlled.  Take your medications regularly as we discussed at this visit.  Please have your labs drawn prior to your next visit so that we may discuss them at the time of your next visit.

## 2020-02-28 NOTE — PROGRESS NOTES
Chief Complaint: Follow up for Hyperthyroidism secondary to Grave's disease and type 2 diabetes and history of Down syndrome    HPI:     Sander Romero is a 24 y.o. male here for follow up of of the above medical issues.   She was previously a patient of KATHERINE Brush.  He has Hyperthyroidism first diagnosed in April 2019 with a positive thyroid uptake and scan from May 2019 showing increased 5-hour uptake of 65% c/w Graves' disease    Previously his hyperthyroidism was difficult to control due to medical noncompliance with methimazole therapy.  Patient has Down syndrome and he does not like taking medications.  His mother also originally did not understand why the patient had to take methimazole.      On follow-up he has been doing better with taking his medications now that his mother understands his thyroid condition and the importance of taking medications.  He is on methimazole 20 mg daily which is been his dose for 1 month    His labs from February 2020 showed the following  His free T4 dropped from 3.08 to 1.81  His free T3 went down from 20 down to 8.03  His TSH is still suppressed which is expected    Unfortunately his heart rate is still uncontrolled at 120 bpm.  His weight is stable.  He denies palpitations, he denies vision changes.    He denies having eye pain eyelid redness and eyelid swelling or pain with eye fixation.        Patient also has type 2 diabetes initially discovered in August 2018.  Baseline A1c has been elevated at 6.7%.  His last A1c was stable at 6.7% on January 16, 2020.    Again the patient originally did not understand the rationale for taking metformin and I explained this again last visit.  He is back to taking mMetformin  mg twice a day.    He currently denies hyperglycemic symptoms such as polyuria, polydipsia and polyphagia as well as blurry vision. He is morbidly obese because of intake of excess calories and Down syndrome    He has not had a formal eye  exam but the patient does admit to blurry vision.  His last urine microalbumin creatinine ratio was normal back in Jan 2020    Last lipid profile showed baseline LDL of 85 and triglycerides of 151 with a normal total cholesterol he is not on a statin because of his young age.    The patient's mother is unaware if the patient has any history of congenital heart disease which is common with Down syndrome.      Patient's medications, allergies, and social histories were reviewed and updated as appropriate.      ROS:     CONS:     No fever, no chills   EYES:     No diplopia, no blurry vision   CV:           No chest pain, no palpitations   PULM:     No SOB, no cough, no hemoptysis.   GI:            No nausea, no vomiting, no diarrhea, no constipation   ENDO:     No polyuria, no polydipsia, no heat intolerance, no cold intolerance       Past Medical History:  Problem List:  2020-01: Hyperthyroidism  2020-01: Graves disease  2020-01: High risk medication use  2019-10: Type 2 diabetes mellitus without complication, without long-  term current use of insulin ([facility])  2017-05: Obesity (BMI 35.0-39.9 without comorbidity) ([facility])  2017-05: Sepsis(995.91)  2017-05: CAP (community acquired pneumonia)  Down syndrome  Low HDL (under 40)  Vitamin D deficiency  Impaired fasting glucose  Sleep apnea  Undescended testicle      Past Surgical History:  Past Surgical History:   Procedure Laterality Date   • MYRINGOTOMY     • OTHER      surgery for undescended testies.   • TESTICLE EXPLORATION      undescended testicle   • TONSILLECTOMY     • TONSILLECTOMY AND ADENOIDECTOMY          Allergies:  Patient has no known allergies.     Social History:  Social History     Tobacco Use   • Smoking status: Never Smoker   • Smokeless tobacco: Never Used   Substance Use Topics   • Alcohol use: Yes     Alcohol/week: 0.0 oz     Comment: rarely   • Drug use: No        Family History:   family history includes Diabetes in his maternal grandfather, paternal  "grandfather, and paternal grandmother; Heart Disease in his mother.      PHYSICAL EXAM:   Vital signs: /58 (BP Location: Left arm, Patient Position: Sitting, BP Cuff Size: Adult)   Pulse (!) 120   Ht 1.651 m (5' 5\")   Wt 86.7 kg (191 lb 3.2 oz)   SpO2 96%   BMI 31.82 kg/m²   GENERAL: Obese young gentleman with Down syndrome in no apparent distress.   EYE:  No ocular asymmetry, PERRLA, No exophthalmos or lid lag  HENT: Pink, moist mucous membranes.    NECK: No thyromegaly.   CARDIOVASCULAR:  No murmurs  LUNGS: Clear breath sounds  ABDOMEN: Soft, nontender   EXTREMITIES: No clubbing, cyanosis, or edema.   NEUROLOGICAL: No gross focal motor abnormalities, there is visible tremors with both hands  LYMPH: No cervical adenopathy palpated.   SKIN: No rashes, lesions.   Monofilament testing with a 10 gram force: sensation: intact bilaterally  Visual Inspection: Feet without maceration, ulcers, or fissures.  Pedal pulses: intact bilaterally      Labs:  Lab Results   Component Value Date/Time    WBC 5.5 04/30/2019 11:38 AM    RBC 5.14 04/30/2019 11:38 AM    HEMOGLOBIN 15.8 04/30/2019 11:38 AM    MCV 92.2 04/30/2019 11:38 AM    MCH 30.7 04/30/2019 11:38 AM    MCHC 33.3 (L) 04/30/2019 11:38 AM    RDW 46.1 04/30/2019 11:38 AM    MPV 8.9 (L) 07/05/2017 11:46 AM       Lab Results   Component Value Date/Time    SODIUM 136 01/16/2020 04:19 PM    POTASSIUM 4.1 01/16/2020 04:19 PM    CHLORIDE 101 01/16/2020 04:19 PM    CO2 23 01/16/2020 04:19 PM    ANION 12.0 (H) 01/16/2020 04:19 PM    GLUCOSE 147 (H) 01/16/2020 04:19 PM    BUN 14 01/16/2020 04:19 PM    CREATININE 0.54 01/16/2020 04:19 PM    CALCIUM 9.4 01/16/2020 04:19 PM    ASTSGOT 31 01/16/2020 04:19 PM    ALTSGPT 57 (H) 01/16/2020 04:19 PM    TBILIRUBIN 0.7 01/16/2020 04:19 PM    ALBUMIN 3.5 01/16/2020 04:19 PM    ALBUMIN 3.93 12/07/2015 09:38 AM    TOTPROTEIN 7.7 01/16/2020 04:19 PM    TOTPROTEIN 8.00 12/07/2015 09:38 AM    GLOBULIN 4.2 (H) 01/16/2020 04:19 PM    " AGRATIO 0.8 01/16/2020 04:19 PM       Lab Results   Component Value Date/Time    TSHULTRASEN <0.005 (L) 01/13/2020 0850     Lab Results   Component Value Date/Time    FREET4 3.08 (H) 01/13/2020 0850     Lab Results   Component Value Date/Time    FREET3 20.30 (H) 01/13/2020 0850     No results found for: THYSTIMIG      Imaging: Please refer to thyroid uptake and scan from May 2019      ASSESSMENT/PLAN:     1. Hyperthyroidism  Uncontrolled with improvement  Free T4 and free T3 levels are improving  Patient was previously noncompliant complicated by poor understanding by parents of the patient's condition and language barrier issues  Patient is now more compliant    Recommend that he continue methimazole 20 mg daily  Recommend that he increase his beta-blocker or atenolol to 25 mg twice a day  We will plan for follow-up in 1 month with repeat of TSH, free T4 and free T3 level      2. Graves disease  This is the etiology of his hyperthyroidism      3. Type 2 diabetes mellitus without complication, without long-term current use of insulin ([facility])  Fairly controlled  A1c stable at 6.7%  Patient was previously noncompliant because of poor understanding of the condition by both the parents and siblings and because of language barrier issues  Patient is now more compliant    Continue Metformin extended release 500 mg 2 pills daily   Recommend annual dilated eye exam  We will plan to repeat A1c in 3 months      4. High risk medication use  Patient is taking methimazole which is a high risk medication      Return in about 4 weeks (around 3/26/2020).      Thank you kindly for allowing me to participate in the thyroid care plan for this patient.    David Kessler MD, FACE, [facility]  01/16/20    CC:   JOSE MIGUEL Jose

## 2020-04-08 ENCOUNTER — HOSPITAL ENCOUNTER (OUTPATIENT)
Dept: LAB | Facility: MEDICAL CENTER | Age: 25
End: 2020-04-08
Attending: INTERNAL MEDICINE
Payer: COMMERCIAL

## 2020-04-08 DIAGNOSIS — Z79.899 HIGH RISK MEDICATION USE: ICD-10-CM

## 2020-04-08 DIAGNOSIS — E05.00 GRAVES DISEASE: ICD-10-CM

## 2020-04-08 DIAGNOSIS — E05.90 HYPERTHYROIDISM: ICD-10-CM

## 2020-04-08 DIAGNOSIS — E11.9 TYPE 2 DIABETES MELLITUS WITHOUT COMPLICATION, WITHOUT LONG-TERM CURRENT USE OF INSULIN (HCC): ICD-10-CM

## 2020-04-08 LAB
T3FREE SERPL-MCNC: 7.52 PG/ML (ref 2.4–4.2)
T4 FREE SERPL-MCNC: 2.43 NG/DL (ref 0.53–1.43)
TSH SERPL DL<=0.005 MIU/L-ACNC: <0.005 UIU/ML (ref 0.38–5.33)

## 2020-04-08 PROCEDURE — 84443 ASSAY THYROID STIM HORMONE: CPT

## 2020-04-08 PROCEDURE — 84439 ASSAY OF FREE THYROXINE: CPT

## 2020-04-08 PROCEDURE — 84481 FREE ASSAY (FT-3): CPT

## 2020-04-08 PROCEDURE — 36415 COLL VENOUS BLD VENIPUNCTURE: CPT

## 2020-04-09 ENCOUNTER — TELEMEDICINE (OUTPATIENT)
Dept: ENDOCRINOLOGY | Facility: MEDICAL CENTER | Age: 25
End: 2020-04-09
Payer: COMMERCIAL

## 2020-04-09 DIAGNOSIS — E05.90 HYPERTHYROIDISM: ICD-10-CM

## 2020-04-09 DIAGNOSIS — E05.00 GRAVES DISEASE: ICD-10-CM

## 2020-04-09 DIAGNOSIS — E11.9 TYPE 2 DIABETES MELLITUS WITHOUT COMPLICATION, WITHOUT LONG-TERM CURRENT USE OF INSULIN (HCC): ICD-10-CM

## 2020-04-09 DIAGNOSIS — Z79.899 HIGH RISK MEDICATION USE: ICD-10-CM

## 2020-04-09 PROCEDURE — 99214 OFFICE O/P EST MOD 30 MIN: CPT | Mod: 95,CR | Performed by: INTERNAL MEDICINE

## 2020-04-09 RX ORDER — METHIMAZOLE 10 MG/1
30 TABLET ORAL DAILY
Qty: 90 TAB | Refills: 1 | Status: SHIPPED | OUTPATIENT
Start: 2020-04-09 | End: 2020-07-03 | Stop reason: SDUPTHER

## 2020-04-09 NOTE — PROGRESS NOTES
Chief Complaint: Follow up for Hyperthyroidism secondary to Grave's disease and type 2 diabetes and history of Down syndrome. Patient was presented for a telehealth consultation via secure and encrypted videoconferencing technology. This encounter was conducted via Zoom .   Verbal consent was obtained. Patient's identity was verified.    HPI:     Sander Romero is a 24 y.o. male here for follow up of of the above medical issues.   She was previously a patient of KATHERINE Brush.  He has Hyperthyroidism first diagnosed in April 2019 with a positive thyroid uptake and scan from May 2019 showing increased 5-hour uptake of 65% c/w Graves' disease    Previously his hyperthyroidism was difficult to control due to medical noncompliance with methimazole therapy.  Patient has Down syndrome and he does not like taking medications.  His mother also originally did not understand why the patient had to take methimazole.      On follow-up he has been doing better with taking his medications now that his mother understands his thyroid condition and the importance of taking medications.  He is on methimazole 20 mg daily which is been his dose for 2 months    His labs from March 2020 showed the following  His free T4 dropped is unchanged at 2.43  His free T3 went is unchanged at 7.52  His TSH is still suppressed which is expected    His weight is stable per family.  He denies palpitations, he denies vision changes.    He denies having eye pain eyelid redness and eyelid swelling or pain with eye fixation.      Patient also has type 2 diabetes initially discovered in August 2018.  Baseline A1c has been elevated at 6.7%.  His last A1c was stable at 6.7% on January 16, 2020.    Again the patient originally did not understand the rationale for taking metformin and I explained this again last visit.  He is back to taking mMetformin  mg twice a day.    He currently denies hyperglycemic symptoms such as polyuria,  polydipsia and polyphagia as well as blurry vision. He is morbidly obese because of intake of excess calories and Down syndrome    He has not had a formal eye exam but the patient does admit to blurry vision.  His last urine microalbumin creatinine ratio was normal back in Jan 2020    Last lipid profile showed baseline LDL of 85 and triglycerides of 151 with a normal total cholesterol he is not on a statin because of his young age.    The patient's mother is unaware if the patient has any history of congenital heart disease which is common with Down syndrome.      Patient's medications, allergies, and social histories were reviewed and updated as appropriate.      ROS:     CONS:     No fever, no chills   EYES:     No diplopia, no blurry vision   CV:           No chest pain, no palpitations   PULM:     No SOB, no cough, no hemoptysis.   GI:            No nausea, no vomiting, no diarrhea, no constipation   ENDO:     No polyuria, no polydipsia, no heat intolerance, no cold intolerance       Past Medical History:  Problem List:  2020-01: Hyperthyroidism  2020-01: Graves disease  2020-01: High risk medication use  2019-10: Type 2 diabetes mellitus without complication, without long-  term current use of insulin ([facility])  2017-05: Obesity (BMI 35.0-39.9 without comorbidity) ([facility])  2017-05: Sepsis(995.91)  2017-05: CAP (community acquired pneumonia)  Down syndrome  Low HDL (under 40)  Vitamin D deficiency  Impaired fasting glucose  Sleep apnea  Undescended testicle      Past Surgical History:  Past Surgical History:   Procedure Laterality Date   • MYRINGOTOMY     • OTHER      surgery for undescended testies.   • TESTICLE EXPLORATION      undescended testicle   • TONSILLECTOMY     • TONSILLECTOMY AND ADENOIDECTOMY          Allergies:  Patient has no known allergies.     Social History:  Social History     Tobacco Use   • Smoking status: Never Smoker   • Smokeless tobacco: Never Used   Substance Use Topics   • Alcohol use: Yes      Alcohol/week: 0.0 oz     Comment: rarely   • Drug use: No        Family History:   family history includes Diabetes in his maternal grandfather, paternal grandfather, and paternal grandmother; Heart Disease in his mother.      PHYSICAL EXAM:   Vital signs: There were no vitals taken for this visit.  GENERAL: Obese young gentleman with Down syndrome in no apparent distress.   EYE:  No ocular asymmetry, PERRLA, No exophthalmos or lid lag  HENT: Pink, moist mucous membranes.    NECK: No thyromegaly.   CARDIOVASCULAR:  No murmurs  LUNGS: Clear breath sounds  ABDOMEN: Soft, nontender   EXTREMITIES: No clubbing, cyanosis, or edema.   NEUROLOGICAL: No gross focal motor abnormalities, there is visible tremors with both hands  LYMPH: No cervical adenopathy palpated.   SKIN: No rashes, lesions.   Monofilament testing with a 10 gram force: sensation: intact bilaterally  Visual Inspection: Feet without maceration, ulcers, or fissures.  Pedal pulses: intact bilaterally      Labs:  Lab Results   Component Value Date/Time    WBC 5.5 04/30/2019 11:38 AM    RBC 5.14 04/30/2019 11:38 AM    HEMOGLOBIN 15.8 04/30/2019 11:38 AM    MCV 92.2 04/30/2019 11:38 AM    MCH 30.7 04/30/2019 11:38 AM    MCHC 33.3 (L) 04/30/2019 11:38 AM    RDW 46.1 04/30/2019 11:38 AM    MPV 8.9 (L) 07/05/2017 11:46 AM       Lab Results   Component Value Date/Time    SODIUM 136 01/16/2020 04:19 PM    POTASSIUM 4.1 01/16/2020 04:19 PM    CHLORIDE 101 01/16/2020 04:19 PM    CO2 23 01/16/2020 04:19 PM    ANION 12.0 (H) 01/16/2020 04:19 PM    GLUCOSE 147 (H) 01/16/2020 04:19 PM    BUN 14 01/16/2020 04:19 PM    CREATININE 0.54 01/16/2020 04:19 PM    CALCIUM 9.4 01/16/2020 04:19 PM    ASTSGOT 31 01/16/2020 04:19 PM    ALTSGPT 57 (H) 01/16/2020 04:19 PM    TBILIRUBIN 0.7 01/16/2020 04:19 PM    ALBUMIN 3.5 01/16/2020 04:19 PM    ALBUMIN 3.93 12/07/2015 09:38 AM    TOTPROTEIN 7.7 01/16/2020 04:19 PM    TOTPROTEIN 8.00 12/07/2015 09:38 AM    GLOBULIN 4.2 (H) 01/16/2020  04:19 PM    AGRATIO 0.8 01/16/2020 04:19 PM       Lab Results   Component Value Date/Time    TSHULTRASEN <0.005 (L) 01/13/2020 0850     Lab Results   Component Value Date/Time    FREET4 3.08 (H) 01/13/2020 0850     Lab Results   Component Value Date/Time    FREET3 20.30 (H) 01/13/2020 0850     No results found for: THYSTIMIG      Imaging: Please refer to thyroid uptake and scan from May 2019      ASSESSMENT/PLAN:     1. Hyperthyroidism  Uncontrolled   Free T4 and free T3 levels are not improving  Patient noncompliant again  Talked to brother and advised him to watch him take his meds  I am increasing his methimazole to 30 mg daily  Recommend that he increase his beta-blocker or atenolol to 25 mg twice a day  We will plan for follow-up in 1 month with repeat of TSH, free T4 and free T3 level      2. Graves disease  This is the etiology of his hyperthyroidism      3. Type 2 diabetes mellitus without complication, without long-term current use of insulin ([facility])  Fairly controlled  A1c stable at 6.7%  Patient was previously noncompliant because of poor understanding of the condition by both the parents and siblings and because of language barrier issues  Patient is now more compliant    Continue Metformin extended release 500 mg 2 pills daily   Recommend annual dilated eye exam  We will plan to repeat A1c in 1 month      4. High risk medication use  Patient is taking methimazole which is a high risk medication      Return in about 4 weeks (around 5/7/2020).      Thank you kindly for allowing me to participate in the thyroid care plan for this patient.    David Kessler MD, NORA, [facility]  01/16/20    CC:   JOSE MIGUEL Jose

## 2020-05-18 RX ORDER — METFORMIN HYDROCHLORIDE 500 MG/1
TABLET, EXTENDED RELEASE ORAL
Qty: 60 TAB | Refills: 0 | Status: SHIPPED | OUTPATIENT
Start: 2020-05-18 | End: 2020-06-23 | Stop reason: SDUPTHER

## 2020-05-18 NOTE — TELEPHONE ENCOUNTER
Received request via: Patient    Was the patient seen in the last year in this department? Yes    Does the patient have an active prescription (recently filled or refills available) for medication(s) requested? No     metFORMIN ER (GLUCOPHAGE XR) 500 MG TABLET SR 24 HR

## 2020-05-27 ENCOUNTER — APPOINTMENT (OUTPATIENT)
Dept: ENDOCRINOLOGY | Facility: MEDICAL CENTER | Age: 25
End: 2020-05-27
Payer: COMMERCIAL

## 2020-06-25 ENCOUNTER — HOSPITAL ENCOUNTER (OUTPATIENT)
Dept: LAB | Facility: MEDICAL CENTER | Age: 25
End: 2020-06-25
Attending: INTERNAL MEDICINE
Payer: COMMERCIAL

## 2020-06-25 DIAGNOSIS — Z79.899 HIGH RISK MEDICATION USE: ICD-10-CM

## 2020-06-25 DIAGNOSIS — E11.9 TYPE 2 DIABETES MELLITUS WITHOUT COMPLICATION, WITHOUT LONG-TERM CURRENT USE OF INSULIN (HCC): ICD-10-CM

## 2020-06-25 DIAGNOSIS — E05.90 HYPERTHYROIDISM: ICD-10-CM

## 2020-06-25 DIAGNOSIS — E05.00 GRAVES DISEASE: ICD-10-CM

## 2020-06-25 LAB
ALBUMIN SERPL BCP-MCNC: 3.9 G/DL (ref 3.2–4.9)
ALBUMIN/GLOB SERPL: 1 G/DL
ALP SERPL-CCNC: 159 U/L (ref 30–99)
ALT SERPL-CCNC: 33 U/L (ref 2–50)
ANION GAP SERPL CALC-SCNC: 14 MMOL/L (ref 7–16)
AST SERPL-CCNC: 21 U/L (ref 12–45)
BASOPHILS # BLD AUTO: 1.4 % (ref 0–1.8)
BASOPHILS # BLD: 0.09 K/UL (ref 0–0.12)
BILIRUB SERPL-MCNC: 0.6 MG/DL (ref 0.1–1.5)
BUN SERPL-MCNC: 14 MG/DL (ref 8–22)
CALCIUM SERPL-MCNC: 9.2 MG/DL (ref 8.5–10.5)
CHLORIDE SERPL-SCNC: 102 MMOL/L (ref 96–112)
CHOLEST SERPL-MCNC: 147 MG/DL (ref 100–199)
CO2 SERPL-SCNC: 22 MMOL/L (ref 20–33)
CREAT SERPL-MCNC: 0.65 MG/DL (ref 0.5–1.4)
CREAT UR-MCNC: 269.34 MG/DL
EOSINOPHIL # BLD AUTO: 0.11 K/UL (ref 0–0.51)
EOSINOPHIL NFR BLD: 1.7 % (ref 0–6.9)
ERYTHROCYTE [DISTWIDTH] IN BLOOD BY AUTOMATED COUNT: 47.5 FL (ref 35.9–50)
EST. AVERAGE GLUCOSE BLD GHB EST-MCNC: 123 MG/DL
FASTING STATUS PATIENT QL REPORTED: NORMAL
GLOBULIN SER CALC-MCNC: 3.9 G/DL (ref 1.9–3.5)
GLUCOSE SERPL-MCNC: 106 MG/DL (ref 65–99)
HBA1C MFR BLD: 5.9 % (ref 0–5.6)
HCT VFR BLD AUTO: 48.2 % (ref 42–52)
HDLC SERPL-MCNC: 33 MG/DL
HGB BLD-MCNC: 16.1 G/DL (ref 14–18)
IMM GRANULOCYTES # BLD AUTO: 0.02 K/UL (ref 0–0.11)
IMM GRANULOCYTES NFR BLD AUTO: 0.3 % (ref 0–0.9)
LDLC SERPL CALC-MCNC: 92 MG/DL
LYMPHOCYTES # BLD AUTO: 3.47 K/UL (ref 1–4.8)
LYMPHOCYTES NFR BLD: 52.7 % (ref 22–41)
MCH RBC QN AUTO: 31.3 PG (ref 27–33)
MCHC RBC AUTO-ENTMCNC: 33.4 G/DL (ref 33.7–35.3)
MCV RBC AUTO: 93.8 FL (ref 81.4–97.8)
MICROALBUMIN UR-MCNC: <1.2 MG/DL
MICROALBUMIN/CREAT UR: NORMAL MG/G (ref 0–30)
MONOCYTES # BLD AUTO: 0.54 K/UL (ref 0–0.85)
MONOCYTES NFR BLD AUTO: 8.2 % (ref 0–13.4)
NEUTROPHILS # BLD AUTO: 2.36 K/UL (ref 1.82–7.42)
NEUTROPHILS NFR BLD: 35.7 % (ref 44–72)
NRBC # BLD AUTO: 0 K/UL
NRBC BLD-RTO: 0 /100 WBC
PLATELET # BLD AUTO: 363 K/UL (ref 164–446)
PMV BLD AUTO: 9 FL (ref 9–12.9)
POTASSIUM SERPL-SCNC: 3.9 MMOL/L (ref 3.6–5.5)
PROT SERPL-MCNC: 7.8 G/DL (ref 6–8.2)
RBC # BLD AUTO: 5.14 M/UL (ref 4.7–6.1)
SODIUM SERPL-SCNC: 138 MMOL/L (ref 135–145)
T3FREE SERPL-MCNC: 4.61 PG/ML (ref 2–4.4)
T4 FREE SERPL-MCNC: 1.65 NG/DL (ref 0.93–1.7)
TRIGL SERPL-MCNC: 108 MG/DL (ref 0–149)
TSH SERPL DL<=0.005 MIU/L-ACNC: 0.01 UIU/ML (ref 0.38–5.33)
WBC # BLD AUTO: 6.6 K/UL (ref 4.8–10.8)

## 2020-06-25 PROCEDURE — 85025 COMPLETE CBC W/AUTO DIFF WBC: CPT

## 2020-06-25 PROCEDURE — 84439 ASSAY OF FREE THYROXINE: CPT

## 2020-06-25 PROCEDURE — 84443 ASSAY THYROID STIM HORMONE: CPT

## 2020-06-25 PROCEDURE — 82570 ASSAY OF URINE CREATININE: CPT

## 2020-06-25 PROCEDURE — 80061 LIPID PANEL: CPT

## 2020-06-25 PROCEDURE — 36415 COLL VENOUS BLD VENIPUNCTURE: CPT

## 2020-06-25 PROCEDURE — 80053 COMPREHEN METABOLIC PANEL: CPT

## 2020-06-25 PROCEDURE — 83036 HEMOGLOBIN GLYCOSYLATED A1C: CPT

## 2020-06-25 PROCEDURE — 84481 FREE ASSAY (FT-3): CPT

## 2020-06-25 PROCEDURE — 82043 UR ALBUMIN QUANTITATIVE: CPT

## 2020-06-25 RX ORDER — METFORMIN HYDROCHLORIDE 500 MG/1
1000 TABLET, EXTENDED RELEASE ORAL DAILY
Qty: 180 TAB | Refills: 1 | Status: SHIPPED | OUTPATIENT
Start: 2020-06-25 | End: 2020-12-28

## 2020-06-26 ENCOUNTER — TELEPHONE (OUTPATIENT)
Dept: ENDOCRINOLOGY | Facility: MEDICAL CENTER | Age: 25
End: 2020-06-26

## 2020-06-26 NOTE — TELEPHONE ENCOUNTER
Phone Number Called: [phone]    Call outcome: Left detailed message for patient. Informed to call back with any additional questions.    Message: Contacted patient's mom and asked to her to please call our office because Dr. Kessler would like to see Sander and her to discuss lab results. I asked her to please call our office to schedule.

## 2020-06-26 NOTE — TELEPHONE ENCOUNTER
----- Message from David Kessler M.D. sent at 6/25/2020  9:29 PM PDT -----  Patient needs appt to discuss labs

## 2020-06-29 DIAGNOSIS — E05.90 HYPERTHYROIDISM: ICD-10-CM

## 2020-06-29 RX ORDER — PROPRANOLOL HYDROCHLORIDE 10 MG/1
10 TABLET ORAL 3 TIMES DAILY
Qty: 90 TAB | Refills: 11 | Status: SHIPPED | OUTPATIENT
Start: 2020-06-29 | End: 2021-02-11 | Stop reason: SDUPTHER

## 2020-07-03 DIAGNOSIS — E05.90 HYPERTHYROIDISM: ICD-10-CM

## 2020-07-06 RX ORDER — METHIMAZOLE 10 MG/1
30 TABLET ORAL DAILY
Qty: 90 TAB | Refills: 1 | Status: SHIPPED | OUTPATIENT
Start: 2020-07-06 | End: 2020-09-03 | Stop reason: SDUPTHER

## 2020-07-06 NOTE — TELEPHONE ENCOUNTER
Received request via: Pharmacy    Was the patient seen in the last year in this department? Yes    Does the patient have an active prescription (recently filled or refills available) for medication(s) requested? No       methimazole (TAPAZOLE) 10 MG Tab             Sig: Take 3 Tabs by mouth every day.

## 2020-07-07 ENCOUNTER — OFFICE VISIT (OUTPATIENT)
Dept: ENDOCRINOLOGY | Facility: MEDICAL CENTER | Age: 25
End: 2020-07-07
Attending: INTERNAL MEDICINE
Payer: COMMERCIAL

## 2020-07-07 VITALS
HEART RATE: 93 BPM | DIASTOLIC BLOOD PRESSURE: 68 MMHG | HEIGHT: 65 IN | SYSTOLIC BLOOD PRESSURE: 120 MMHG | WEIGHT: 198.8 LBS | BODY MASS INDEX: 33.12 KG/M2 | OXYGEN SATURATION: 98 %

## 2020-07-07 DIAGNOSIS — Z79.899 HIGH RISK MEDICATION USE: ICD-10-CM

## 2020-07-07 DIAGNOSIS — E05.00 GRAVES DISEASE: ICD-10-CM

## 2020-07-07 DIAGNOSIS — E05.90 HYPERTHYROIDISM: ICD-10-CM

## 2020-07-07 DIAGNOSIS — E11.9 TYPE 2 DIABETES MELLITUS WITHOUT COMPLICATION, WITHOUT LONG-TERM CURRENT USE OF INSULIN (HCC): ICD-10-CM

## 2020-07-07 PROCEDURE — 99214 OFFICE O/P EST MOD 30 MIN: CPT | Performed by: INTERNAL MEDICINE

## 2020-07-07 PROCEDURE — 99211 OFF/OP EST MAY X REQ PHY/QHP: CPT | Performed by: INTERNAL MEDICINE

## 2020-07-07 ASSESSMENT — FIBROSIS 4 INDEX: FIB4 SCORE: 0.24

## 2020-07-07 NOTE — PROGRESS NOTES
Chief Complaint: Follow up for Hyperthyroidism secondary to Grave's disease and type 2 diabetes and history of Down syndrome.     HPI:     Sander Romero is a 24 y.o. male here for follow up of of the above medical issues.   She was previously a patient of KATHERINE Brush.    He has Hyperthyroidism first diagnosed in April 2019 with a positive thyroid uptake and scan from May 2019 showing increased 5-hour uptake of 65% c/w Graves' disease.   Previously his hyperthyroidism was difficult to control due to noncompliance with methimazole therapy.  Patient has Down syndrome and he does not like taking medications.  His mother also originally did not understand why the patient had to take methimazole.      On follow-up he has been doing better.   He is on methimazole 30 mg daily which is been his dose for 2 months.  His mother now admits that they sometimes find his methimazole tablets in his cabinet or in his drawer, they have been encouraging the patient to take his medications regularly.      His labs from June 2020 showed the following:    His free T4 dropped to normal at 1.65  His free T3 dropped to close to normal at 4.6  His TSH is still suppressed which is expected    His weight is stable per family.  He denies palpitations, he denies vision changes.      He denies having eye pain eyelid redness and eyelid swelling or pain with eye fixation.  He has a heat related rash and this is not related to methimazole      Patient also has type 2 diabetes initially discovered in August 2018.    His last A1c was much better at 5.9% on June 2020    He is back to taking Metformin  mg twice a day.  His weight has increased from 191 to 198lbs  He hasnt been able to go to the gym due to COVID 19  He currently denies hyperglycemic symptoms such as polyuria, polydipsia and polyphagia as well as blurry vision.     His mom claims that he had a formal eye exam but we do not have the records  His last urine  microalbumin creatinine ratio was normal back in Jan 2020    Last lipid profile showed baseline LDL of 92 and triglycerides of 108   with a normal total cholesterol he is not on a statin because of his young age.    The patient's mother is unaware if the patient has any history of congenital heart disease   which is common with Down syndrome.      Patient's medications, allergies, and social histories were reviewed and updated as appropriate.      ROS:     CONS:     No fever, no chills   EYES:     No diplopia, no blurry vision   CV:           No chest pain, no palpitations   PULM:     No SOB, no cough, no hemoptysis.   GI:            No nausea, no vomiting, no diarrhea, no constipation   ENDO:     No polyuria, no polydipsia, no heat intolerance, no cold intolerance       Past Medical History:  Problem List:  2020-01: Hyperthyroidism  2020-01: Graves disease  2020-01: High risk medication use  2019-10: Type 2 diabetes mellitus without complication, without long-  term current use of insulin ([facility])  2017-05: Obesity (BMI 35.0-39.9 without comorbidity) ([facility])  2017-05: Sepsis(995.91)  2017-05: CAP (community acquired pneumonia)  Down syndrome  Low HDL (under 40)  Vitamin D deficiency  Impaired fasting glucose  Sleep apnea  Undescended testicle      Past Surgical History:  Past Surgical History:   Procedure Laterality Date   • MYRINGOTOMY     • OTHER      surgery for undescended testies.   • TESTICLE EXPLORATION      undescended testicle   • TONSILLECTOMY     • TONSILLECTOMY AND ADENOIDECTOMY          Allergies:  Patient has no known allergies.     Social History:  Social History     Tobacco Use   • Smoking status: Never Smoker   • Smokeless tobacco: Never Used   Substance Use Topics   • Alcohol use: Yes     Alcohol/week: 0.0 oz     Comment: rarely   • Drug use: No        Family History:   family history includes Diabetes in his maternal grandfather, paternal grandfather, and paternal grandmother; Heart Disease in his  "mother.      PHYSICAL EXAM:   Vital signs: /68 (BP Location: Left arm, Patient Position: Sitting, BP Cuff Size: Adult)   Pulse 93   Ht 1.651 m (5' 5\")   Wt 90.2 kg (198 lb 12.8 oz)   SpO2 98%   BMI 33.08 kg/m²   GENERAL: Obese young gentleman with Down syndrome in no apparent distress.   EYE:  No ocular asymmetry, PERRLA, No exophthalmos or lid lag  HENT: Pink, moist mucous membranes.    NECK: No thyromegaly.   CARDIOVASCULAR:  No murmurs  LUNGS: Clear breath sounds  ABDOMEN: Soft, nontender   EXTREMITIES: No clubbing, cyanosis, or edema.   NEUROLOGICAL: No gross focal motor abnormalities, there is visible tremors with both hands  LYMPH: No cervical adenopathy palpated.   SKIN: No rashes, lesions.   Monofilament testing with a 10 gram force: sensation: intact bilaterally  Visual Inspection: Feet without maceration, ulcers, or fissures.  Pedal pulses: intact bilaterally      Labs:  Lab Results   Component Value Date/Time    WBC 6.6 06/25/2020 08:04 AM    RBC 5.14 06/25/2020 08:04 AM    HEMOGLOBIN 16.1 06/25/2020 08:04 AM    MCV 93.8 06/25/2020 08:04 AM    MCH 31.3 06/25/2020 08:04 AM    MCHC 33.4 (L) 06/25/2020 08:04 AM    RDW 47.5 06/25/2020 08:04 AM    MPV 9.0 06/25/2020 08:04 AM       Lab Results   Component Value Date/Time    SODIUM 138 06/25/2020 08:04 AM    POTASSIUM 3.9 06/25/2020 08:04 AM    CHLORIDE 102 06/25/2020 08:04 AM    CO2 22 06/25/2020 08:04 AM    ANION 14.0 06/25/2020 08:04 AM    GLUCOSE 106 (H) 06/25/2020 08:04 AM    BUN 14 06/25/2020 08:04 AM    CREATININE 0.65 06/25/2020 08:04 AM    CALCIUM 9.2 06/25/2020 08:04 AM    ASTSGOT 21 06/25/2020 08:04 AM    ALTSGPT 33 06/25/2020 08:04 AM    TBILIRUBIN 0.6 06/25/2020 08:04 AM    ALBUMIN 3.9 06/25/2020 08:04 AM    ALBUMIN 3.93 12/07/2015 09:38 AM    TOTPROTEIN 7.8 06/25/2020 08:04 AM    TOTPROTEIN 8.00 12/07/2015 09:38 AM    GLOBULIN 3.9 (H) 06/25/2020 08:04 AM    AGRATIO 1.0 06/25/2020 08:04 AM       Lab Results   Component Value Date/Time    " TSHULTRASEN <0.005 (L) 01/13/2020 0850     Lab Results   Component Value Date/Time    FREET4 3.08 (H) 01/13/2020 0850     Lab Results   Component Value Date/Time    FREET3 20.30 (H) 01/13/2020 0850     No results found for: THYSTIMIG      Imaging: Please refer to thyroid uptake and scan from May 2019      ASSESSMENT/PLAN:     1. Hyperthyroidism  Improved control  Free T4 is back to normal  Free T3 levels are close to normal and is trending down  Continue methimazole 30 mg daily  Continue atenolol as needed for control palpitations  We will plan for follow-up in 2 months with repeat of TSH, free T4 and free T3 level    2. Graves disease  This is the etiology of his hyperthyroidism    3. Type 2 diabetes mellitus without complication, without long-term current use of insulin ([facility])  Fairly controlled  His A1c is better at 5.9%  Continue Metformin extended release 500 mg 2 pills daily   Recommend annual dilated eye exam  We will plan to repeat A1c in 6 months    4. High risk medication use  Patient is taking methimazole which is a high risk medication      Return in about 3 months (around 10/7/2020).      Thank you kindly for allowing me to participate in the thyroid care plan for this patient.    David Kessler MD, FACE, [facility]  01/16/20    CC:   INGRIS Jose.

## 2020-09-03 DIAGNOSIS — E05.90 HYPERTHYROIDISM: ICD-10-CM

## 2020-09-04 RX ORDER — METHIMAZOLE 10 MG/1
30 TABLET ORAL DAILY
Qty: 90 TAB | Refills: 1 | Status: SHIPPED | OUTPATIENT
Start: 2020-09-04 | End: 2020-11-30

## 2020-09-28 ENCOUNTER — HOSPITAL ENCOUNTER (OUTPATIENT)
Dept: LAB | Facility: MEDICAL CENTER | Age: 25
End: 2020-09-28
Attending: INTERNAL MEDICINE
Payer: COMMERCIAL

## 2020-09-28 ENCOUNTER — TELEPHONE (OUTPATIENT)
Dept: ENDOCRINOLOGY | Facility: MEDICAL CENTER | Age: 25
End: 2020-09-28

## 2020-09-28 DIAGNOSIS — E05.90 HYPERTHYROIDISM: ICD-10-CM

## 2020-09-28 DIAGNOSIS — E05.00 GRAVES DISEASE: ICD-10-CM

## 2020-09-28 LAB
T3FREE SERPL-MCNC: 4.63 PG/ML (ref 2–4.4)
T4 FREE SERPL-MCNC: 1.55 NG/DL (ref 0.93–1.7)
TSH SERPL DL<=0.005 MIU/L-ACNC: 0.01 UIU/ML (ref 0.38–5.33)

## 2020-09-28 PROCEDURE — 36415 COLL VENOUS BLD VENIPUNCTURE: CPT

## 2020-09-28 PROCEDURE — 84481 FREE ASSAY (FT-3): CPT

## 2020-09-28 PROCEDURE — 84443 ASSAY THYROID STIM HORMONE: CPT

## 2020-09-28 PROCEDURE — 84439 ASSAY OF FREE THYROXINE: CPT

## 2020-09-29 NOTE — TELEPHONE ENCOUNTER
Phone Number Called: [phone]    Call outcome: Left detailed message for patient. Informed to call back with any additional questions.    Message: Contacted patient's mom. I asked her to call us back to schedule an appointment to go over labs.

## 2020-09-29 NOTE — TELEPHONE ENCOUNTER
----- Message from David Kessler M.D. sent at 9/28/2020  4:29 PM PDT -----  Patient needs appointment to go over labs - he has down's syndrome

## 2020-11-30 DIAGNOSIS — E05.90 HYPERTHYROIDISM: ICD-10-CM

## 2020-11-30 RX ORDER — METHIMAZOLE 10 MG/1
TABLET ORAL
Qty: 90 TAB | Refills: 0 | Status: SHIPPED | OUTPATIENT
Start: 2020-11-30 | End: 2020-12-28

## 2020-12-26 DIAGNOSIS — E11.9 TYPE 2 DIABETES MELLITUS WITHOUT COMPLICATION, WITHOUT LONG-TERM CURRENT USE OF INSULIN (HCC): ICD-10-CM

## 2020-12-28 DIAGNOSIS — E05.90 HYPERTHYROIDISM: ICD-10-CM

## 2020-12-28 RX ORDER — METFORMIN HYDROCHLORIDE 500 MG/1
TABLET, EXTENDED RELEASE ORAL
Qty: 180 TAB | Refills: 0 | Status: SHIPPED | OUTPATIENT
Start: 2020-12-28 | End: 2021-02-10

## 2020-12-28 RX ORDER — METHIMAZOLE 10 MG/1
15 TABLET ORAL 2 TIMES DAILY
Qty: 270 TAB | Refills: 0 | Status: SHIPPED | OUTPATIENT
Start: 2020-12-28 | End: 2021-02-10

## 2021-02-02 DIAGNOSIS — E05.90 HYPERTHYROIDISM: ICD-10-CM

## 2021-02-02 NOTE — TELEPHONE ENCOUNTER
Received request via: Pharmacy    Was the patient seen in the last year in this department? Yes    Does the patient have an active prescription (recently filled or refills available) for medication(s) requested? No       methimazole (TAPAZOLE) 10 MG Tab      Sig: Take 3 tablets by mouth once daily

## 2021-02-03 RX ORDER — METHIMAZOLE 10 MG/1
TABLET ORAL
Qty: 90 TAB | Refills: 0 | OUTPATIENT
Start: 2021-02-03

## 2021-02-03 RX ORDER — METHIMAZOLE 10 MG/1
15 TABLET ORAL 2 TIMES DAILY
Qty: 270 TAB | Refills: 0 | OUTPATIENT
Start: 2021-02-03

## 2021-02-09 DIAGNOSIS — E11.9 TYPE 2 DIABETES MELLITUS WITHOUT COMPLICATION, WITHOUT LONG-TERM CURRENT USE OF INSULIN (HCC): ICD-10-CM

## 2021-02-09 DIAGNOSIS — E05.90 HYPERTHYROIDISM: ICD-10-CM

## 2021-02-10 ENCOUNTER — TELEPHONE (OUTPATIENT)
Dept: MEDICAL GROUP | Facility: MEDICAL CENTER | Age: 26
End: 2021-02-10

## 2021-02-10 RX ORDER — METFORMIN HYDROCHLORIDE 500 MG/1
TABLET, EXTENDED RELEASE ORAL
Qty: 180 TABLET | Refills: 0 | Status: SHIPPED | OUTPATIENT
Start: 2021-02-10 | End: 2021-02-11 | Stop reason: SDUPTHER

## 2021-02-10 RX ORDER — METHIMAZOLE 10 MG/1
TABLET ORAL
Qty: 90 TABLET | Refills: 0 | Status: SHIPPED | OUTPATIENT
Start: 2021-02-10 | End: 2021-02-11 | Stop reason: SDUPTHER

## 2021-02-10 NOTE — TELEPHONE ENCOUNTER
Received request via: Pharmacy    Was the patient seen in the last year in this department? Yes    Does the patient have an active prescription (recently filled or refills available) for medication(s) requested? No     metFORMIN HCl  MG Oral Tablet Extended Release 24 Hour        Will file in chart as: metFORMIN ER (GLUCOPHAGE XR) 500 MG TABLET SR 24 HR   Sig: Take 2 tablets by mouth once daily   Disp:  180 Tab    Refills:  0   Start: 2/9/2021   Class: Normal   For: Type 2 diabetes mellitus without complication, without long-term current use of insulin ([facility])   Last ordered: 1 month ago by David Kessler M.D. Last refill: 12/28/2020   Rx #: [id number]

## 2021-02-10 NOTE — TELEPHONE ENCOUNTER
Received request via: Pharmacy    Was the patient seen in the last year in this department? Yes    Does the patient have an active prescription (recently filled or refills available) for medication(s) requested? No     methIMAzole 10 MG Oral Tablet        Will file in chart as: methimazole (TAPAZOLE) 10 MG Tab    Possible duplicate: Hover to review recent actions on this medication   Sig: Take 3 tablets by mouth once daily   Disp:  90 Tab    Refills:  0   Start: 2/9/2021   Class: Normal   Non-formulary For: Hyperthyroidism   Last ordered: 1 month ago by David Kessler M.D. Last refill: 11/5/2020   Rx #: [id number]

## 2021-02-10 NOTE — TELEPHONE ENCOUNTER
ESTABLISHED PATIENT PRE-VISIT PLANNING     Patient was NOT contacted to complete PVP.     Note: Patient will not be contacted if there is no indication to call.     1.  Reviewed notes from the last few office visits within the medical group: Yes    2.  If any orders were placed at last visit or intended to be done for this visit (i.e. 6 mos follow-up), do we have Results/Consult Notes?         •  Labs - Labs were not ordered at last office visit.  Note: If patient appointment is for lab review and patient did not complete labs, check with provider if OK to reschedule patient until labs completed.       •  Imaging - Imaging was not ordered at last office visit.       •  Referrals - No referrals were ordered at last office visit.    3. Is this appointment scheduled as a Hospital Follow-Up? No    4.  Immunizations were updated in Epic using Reconcile Outside Information activity? Yes    5.  Patient is due for the following Health Maintenance Topics:   Health Maintenance Due   Topic Date Due   • IMM PNEUMOCOCCAL VACCINE: 0-64 Years (1 of 1 - PPSV23) 09/05/2001   • RETINAL SCREENING  09/05/2013   • IMM HPV VACCINE (3 - Male 3-dose series) 07/28/2019   • IMM INFLUENZA (1) 09/01/2020   • A1C SCREENING  12/25/2020     6.  AHA (Pulse8) form printed for Provider? N/A

## 2021-02-11 ENCOUNTER — OFFICE VISIT (OUTPATIENT)
Dept: MEDICAL GROUP | Facility: MEDICAL CENTER | Age: 26
End: 2021-02-11
Payer: COMMERCIAL

## 2021-02-11 ENCOUNTER — HOSPITAL ENCOUNTER (OUTPATIENT)
Dept: LAB | Facility: MEDICAL CENTER | Age: 26
End: 2021-02-11
Attending: NURSE PRACTITIONER
Payer: COMMERCIAL

## 2021-02-11 VITALS
OXYGEN SATURATION: 96 % | DIASTOLIC BLOOD PRESSURE: 70 MMHG | BODY MASS INDEX: 35.32 KG/M2 | HEIGHT: 65 IN | SYSTOLIC BLOOD PRESSURE: 120 MMHG | WEIGHT: 212 LBS | TEMPERATURE: 97.7 F | HEART RATE: 102 BPM

## 2021-02-11 DIAGNOSIS — E05.90 HYPERTHYROIDISM: ICD-10-CM

## 2021-02-11 DIAGNOSIS — Z23 NEED FOR HPV VACCINATION: ICD-10-CM

## 2021-02-11 DIAGNOSIS — Z23 NEED FOR INFLUENZA VACCINATION: ICD-10-CM

## 2021-02-11 DIAGNOSIS — Z13.21 ENCOUNTER FOR VITAMIN DEFICIENCY SCREENING: ICD-10-CM

## 2021-02-11 DIAGNOSIS — D70.9 NEUTROPENIA, UNSPECIFIED TYPE (HCC): ICD-10-CM

## 2021-02-11 DIAGNOSIS — E11.9 TYPE 2 DIABETES MELLITUS WITHOUT COMPLICATION, WITHOUT LONG-TERM CURRENT USE OF INSULIN (HCC): ICD-10-CM

## 2021-02-11 DIAGNOSIS — Z23 NEED FOR PNEUMOCOCCAL VACCINATION: ICD-10-CM

## 2021-02-11 LAB
ALBUMIN SERPL BCP-MCNC: 3.7 G/DL (ref 3.2–4.9)
ALBUMIN/GLOB SERPL: 0.9 G/DL
ALP SERPL-CCNC: 152 U/L (ref 30–99)
ALT SERPL-CCNC: 96 U/L (ref 2–50)
ANION GAP SERPL CALC-SCNC: 12 MMOL/L (ref 7–16)
AST SERPL-CCNC: 40 U/L (ref 12–45)
BASOPHILS # BLD AUTO: 1 % (ref 0–1.8)
BASOPHILS # BLD: 0.07 K/UL (ref 0–0.12)
BILIRUB SERPL-MCNC: 0.5 MG/DL (ref 0.1–1.5)
BUN SERPL-MCNC: 16 MG/DL (ref 8–22)
CALCIUM SERPL-MCNC: 9.1 MG/DL (ref 8.4–10.2)
CHLORIDE SERPL-SCNC: 99 MMOL/L (ref 96–112)
CHOLEST SERPL-MCNC: 123 MG/DL (ref 100–199)
CO2 SERPL-SCNC: 25 MMOL/L (ref 20–33)
CREAT SERPL-MCNC: 0.53 MG/DL (ref 0.5–1.4)
EOSINOPHIL # BLD AUTO: 0.09 K/UL (ref 0–0.51)
EOSINOPHIL NFR BLD: 1.3 % (ref 0–6.9)
ERYTHROCYTE [DISTWIDTH] IN BLOOD BY AUTOMATED COUNT: 43.4 FL (ref 35.9–50)
FASTING STATUS PATIENT QL REPORTED: NORMAL
GLOBULIN SER CALC-MCNC: 4 G/DL (ref 1.9–3.5)
GLUCOSE SERPL-MCNC: 168 MG/DL (ref 65–99)
HCT VFR BLD AUTO: 44.8 % (ref 42–52)
HDLC SERPL-MCNC: 34 MG/DL
HGB BLD-MCNC: 15.2 G/DL (ref 14–18)
IMM GRANULOCYTES # BLD AUTO: 0.02 K/UL (ref 0–0.11)
IMM GRANULOCYTES NFR BLD AUTO: 0.3 % (ref 0–0.9)
LDLC SERPL CALC-MCNC: 63 MG/DL
LYMPHOCYTES # BLD AUTO: 2.67 K/UL (ref 1–4.8)
LYMPHOCYTES NFR BLD: 38.4 % (ref 22–41)
MCH RBC QN AUTO: 32.3 PG (ref 27–33)
MCHC RBC AUTO-ENTMCNC: 33.9 G/DL (ref 33.7–35.3)
MCV RBC AUTO: 95.3 FL (ref 81.4–97.8)
MONOCYTES # BLD AUTO: 0.73 K/UL (ref 0–0.85)
MONOCYTES NFR BLD AUTO: 10.5 % (ref 0–13.4)
NEUTROPHILS # BLD AUTO: 3.38 K/UL (ref 1.82–7.42)
NEUTROPHILS NFR BLD: 48.5 % (ref 44–72)
NRBC # BLD AUTO: 0 K/UL
NRBC BLD-RTO: 0 /100 WBC
PLATELET # BLD AUTO: 317 K/UL (ref 164–446)
PMV BLD AUTO: 9 FL (ref 9–12.9)
POTASSIUM SERPL-SCNC: 4.3 MMOL/L (ref 3.6–5.5)
PROT SERPL-MCNC: 7.7 G/DL (ref 6–8.2)
RBC # BLD AUTO: 4.7 M/UL (ref 4.7–6.1)
SODIUM SERPL-SCNC: 136 MMOL/L (ref 135–145)
T4 FREE SERPL-MCNC: 4.57 NG/DL (ref 0.93–1.7)
TRIGL SERPL-MCNC: 132 MG/DL (ref 0–149)
TSH SERPL DL<=0.005 MIU/L-ACNC: 0.01 UIU/ML (ref 0.38–5.33)
WBC # BLD AUTO: 7 K/UL (ref 4.8–10.8)

## 2021-02-11 PROCEDURE — 90472 IMMUNIZATION ADMIN EACH ADD: CPT | Performed by: NURSE PRACTITIONER

## 2021-02-11 PROCEDURE — 80061 LIPID PANEL: CPT

## 2021-02-11 PROCEDURE — 85025 COMPLETE CBC W/AUTO DIFF WBC: CPT

## 2021-02-11 PROCEDURE — 80053 COMPREHEN METABOLIC PANEL: CPT

## 2021-02-11 PROCEDURE — 90670 PCV13 VACCINE IM: CPT | Performed by: NURSE PRACTITIONER

## 2021-02-11 PROCEDURE — 84481 FREE ASSAY (FT-3): CPT

## 2021-02-11 PROCEDURE — 90651 9VHPV VACCINE 2/3 DOSE IM: CPT | Performed by: NURSE PRACTITIONER

## 2021-02-11 PROCEDURE — 82570 ASSAY OF URINE CREATININE: CPT

## 2021-02-11 PROCEDURE — 82306 VITAMIN D 25 HYDROXY: CPT

## 2021-02-11 PROCEDURE — 84439 ASSAY OF FREE THYROXINE: CPT

## 2021-02-11 PROCEDURE — 84443 ASSAY THYROID STIM HORMONE: CPT

## 2021-02-11 PROCEDURE — 99214 OFFICE O/P EST MOD 30 MIN: CPT | Mod: 25 | Performed by: NURSE PRACTITIONER

## 2021-02-11 PROCEDURE — 36415 COLL VENOUS BLD VENIPUNCTURE: CPT

## 2021-02-11 PROCEDURE — 82043 UR ALBUMIN QUANTITATIVE: CPT

## 2021-02-11 PROCEDURE — 90686 IIV4 VACC NO PRSV 0.5 ML IM: CPT | Performed by: NURSE PRACTITIONER

## 2021-02-11 PROCEDURE — 83036 HEMOGLOBIN GLYCOSYLATED A1C: CPT

## 2021-02-11 PROCEDURE — 90471 IMMUNIZATION ADMIN: CPT | Performed by: NURSE PRACTITIONER

## 2021-02-11 RX ORDER — METHIMAZOLE 10 MG/1
TABLET ORAL
Qty: 270 TABLET | Refills: 0 | Status: SHIPPED | OUTPATIENT
Start: 2021-02-11 | End: 2021-03-16 | Stop reason: SDUPTHER

## 2021-02-11 RX ORDER — METFORMIN HYDROCHLORIDE 500 MG/1
TABLET, EXTENDED RELEASE ORAL
Qty: 180 TABLET | Refills: 0 | Status: SHIPPED | OUTPATIENT
Start: 2021-02-11 | End: 2021-03-31 | Stop reason: SDUPTHER

## 2021-02-11 RX ORDER — METHIMAZOLE 10 MG/1
TABLET ORAL
Qty: 270 TABLET | Refills: 0 | Status: SHIPPED | OUTPATIENT
Start: 2021-02-11 | End: 2021-02-11 | Stop reason: SDUPTHER

## 2021-02-11 RX ORDER — PROPRANOLOL HYDROCHLORIDE 10 MG/1
10 TABLET ORAL 3 TIMES DAILY
Qty: 270 TABLET | Refills: 0 | Status: SHIPPED | OUTPATIENT
Start: 2021-02-11 | End: 2021-05-25 | Stop reason: SDUPTHER

## 2021-02-11 ASSESSMENT — PATIENT HEALTH QUESTIONNAIRE - PHQ9: CLINICAL INTERPRETATION OF PHQ2 SCORE: 0

## 2021-02-11 ASSESSMENT — FIBROSIS 4 INDEX: FIB4 SCORE: 0.25

## 2021-02-11 NOTE — PROGRESS NOTES
Subjective:     Sander Romero is a 25 y.o. male who presents with DM.    HPI:   Seen in f/u for DM.  He is seen with his mother today.  Pt is nonverbal.  She reports that he is feeling well.    He has been followed by Dr Kessler over the last year for his hyperthyroidism and DM.  He needs refills but cannot get them from [facility] until he is seen.  He is now out of meds.   He is stable on methimazole and inderal for his hyperthyroidism.  He is due updated lab.  Last year he was having insomnia d/t his thyroid.  She reports that he is sleeping better now.  Needs refills on both meds.  Taking meds approp.  He is on metformin for his DM.  No recent A1C done.  Will do his updated lab for chol and DM.  Last LP shows chronic low HDL but LDL at goal.  trg had also improved last year.  He is not on healthy diet.  Does not exercise.  a1c was improved on last test in 6/20.  It was down from 6.7 to 5.9.  He has appt with Dr Kessler in march.  He is due updated eye exam for his DM.   His CBC's have shown chronically abn neutrophils and lymphs.  No sx of infection. plts also low.  Will recheck cbc.    He is due prevnar 13, flu shot and HPV first shot.    Will also check vitamin d. It has been low in the past.  Not on otc supplement.       Patient Active Problem List    Diagnosis Date Noted   • Hyperthyroidism 01/16/2020   • Graves disease 01/16/2020   • High risk medication use 01/16/2020   • Type 2 diabetes mellitus without complication, without long-term current use of insulin ([facility]) 10/22/2019   • Obesity (BMI 35.0-39.9 without comorbidity) ([facility]) 05/23/2017   • Sepsis(995.91) 05/12/2017   • CAP (community acquired pneumonia) 05/12/2017   • Low HDL (under 40)    • Vitamin D deficiency    • Sleep apnea    • Undescended testicle    • Down syndrome        Current medicines (including changes today)  Current Outpatient Medications   Medication Sig Dispense Refill   • metFORMIN ER (GLUCOPHAGE XR) 500 MG TABLET SR 24 HR  "Take 2 tablets by mouth once daily 180 tablet 0   • methimazole (TAPAZOLE) 10 MG Tab Take 3 tablets by mouth once daily 270 tablet 0   • propranolol (INDERAL) 10 MG Tab Take 1 tablet by mouth 3 times a day. 270 tablet 0   • therapeutic multivitamin-minerals (THERAGRAN-M) Tab Take 1 Tab by mouth every day. 30 Tab 0   • Cholecalciferol (VITAMIN D PO) Take 1 Cap by mouth every day.       No current facility-administered medications for this visit.       No Known Allergies    ROS  Constitutional: Negative. Negative for fever, chills, weight loss, malaise/fatigue and diaphoresis.   HENT: Negative. Negative for hearing loss, ear pain, nosebleeds, congestion, sore throat, neck pain, tinnitus and ear discharge.   Respiratory: Negative. Negative for cough, hemoptysis, sputum production, shortness of breath, wheezing and stridor.   Cardiovascular: Negative. Negative for chest pain, palpitations, orthopnea, claudication, leg swelling and PND.   Gastrointestinal: Denies nausea, vomiting, diarrhea, constipation, heartburn, melena or hematochezia.  Genitourinary: Denies dysuria, hematuria, urinary incontinence, frequency or urgency.        Objective:     /70 (BP Location: Right arm, Patient Position: Sitting)   Pulse (!) 102   Temp 36.5 °C (97.7 °F) (Temporal)   Ht 1.651 m (5' 5\")   Wt 96.2 kg (212 lb)   SpO2 96%  Body mass index is 35.28 kg/m².    Physical Exam:  Vitals reviewed.  Constitutional: Oriented to person, place, and time. appears well-developed and well-nourished. No distress.   Cardiovascular: Normal rate, regular rhythm, normal heart sounds and intact distal pulses. Exam reveals no gallop and no friction rub. No murmur heard. No carotid bruits.   Pulmonary/Chest: Effort normal and breath sounds normal. No stridor. No respiratory distress. no wheezes or rales. exhibits no tenderness.   Musculoskeletal: Normal range of motion. exhibits no edema. pretty pedal pulses 2+.  Lymphadenopathy: No cervical or " supraclavicular adenopathy.   Neurological: Alert and oriented to person, place, and time. exhibits normal muscle tone.  Skin: Skin is warm and dry. No diaphoresis.   Psychiatric: Normal mood and affect. Behavior is normal.      Assessment and Plan:     The following treatment plan was discussed:    1. Type 2 diabetes mellitus without complication, without long-term current use of insulin (HCC)  metFORMIN ER (GLUCOPHAGE XR) 500 MG TABLET SR 24 HR    Comp Metabolic Panel    HEMOGLOBIN A1C    MICROALBUMIN CREAT RATIO URINE    Lipid Profile    REFERRAL TO OPHTHALMOLOGY    refilled metformin.  do lab and f/u with mother with results.  f/u w/[facility] as sched   2. Hyperthyroidism  methimazole (TAPAZOLE) 10 MG Tab    propranolol (INDERAL) 10 MG Tab    TSH    FREE THYROXINE    T3 FREE    stable on inderal and methimazole.  refilled meds.  ordered lab.  he will do today.  f/u w/mother w/results.  f/u w/[facility] as sched.     3. Neutropenia, unspecified type (HCC)  CBC WITH DIFFERENTIAL    recheck cbc.  f/u w/mother w/results.     4. Encounter for vitamin deficiency screening  VITAMIN D,25 HYDROXY    will check vitamin d.  not on supplement.   5. Need for influenza vaccination  Influenza Vaccine Quad Injection (PF)   6. Need for HPV vaccination  Gardasil 9   7. Need for pneumococcal vaccination  Pneumococcal Conjugate Vaccine 13-Valent         Followup: Return in about 1 year (around 2/11/2022), or if symptoms worsen or fail to improve.   Continue routine f/u with Dr Kessler.

## 2021-02-12 LAB
25(OH)D3 SERPL-MCNC: 35 NG/ML (ref 30–100)
CREAT UR-MCNC: 109.67 MG/DL
EST. AVERAGE GLUCOSE BLD GHB EST-MCNC: 123 MG/DL
HBA1C MFR BLD: 5.9 % (ref 0–5.6)
MICROALBUMIN UR-MCNC: <1.2 MG/DL
MICROALBUMIN/CREAT UR: NORMAL MG/G (ref 0–30)
T3FREE SERPL-MCNC: 16.3 PG/ML (ref 2–4.4)

## 2021-03-15 DIAGNOSIS — E05.90 HYPERTHYROIDISM: ICD-10-CM

## 2021-03-16 ENCOUNTER — TELEPHONE (OUTPATIENT)
Dept: ENDOCRINOLOGY | Facility: MEDICAL CENTER | Age: 26
End: 2021-03-16

## 2021-03-16 DIAGNOSIS — E05.90 HYPERTHYROIDISM: ICD-10-CM

## 2021-03-16 RX ORDER — METHIMAZOLE 10 MG/1
TABLET ORAL
Qty: 270 TABLET | Refills: 0 | Status: SHIPPED | OUTPATIENT
Start: 2021-03-16 | End: 2021-03-31 | Stop reason: SDUPTHER

## 2021-03-16 NOTE — TELEPHONE ENCOUNTER
Patient is requesting a refill on methimazole (TAPAZOLE) 10 MG Tab. He has a f/v appt on 3/31/21.

## 2021-03-17 RX ORDER — METHIMAZOLE 10 MG/1
TABLET ORAL
Refills: 0 | OUTPATIENT
Start: 2021-03-17

## 2021-03-17 NOTE — TELEPHONE ENCOUNTER
Phone Number Called: [phone]      Call outcome: Spoke to patient regarding message below.    Message: Contacted patient and spoke with his mom to let her know that Dr. Kessler sent methimazole Rx to pharmacy. She will contact [facility] for status. She was very thankful for the call.

## 2021-03-31 ENCOUNTER — OFFICE VISIT (OUTPATIENT)
Dept: ENDOCRINOLOGY | Facility: MEDICAL CENTER | Age: 26
End: 2021-03-31
Attending: INTERNAL MEDICINE
Payer: COMMERCIAL

## 2021-03-31 VITALS
BODY MASS INDEX: 35.35 KG/M2 | OXYGEN SATURATION: 98 % | HEIGHT: 65 IN | SYSTOLIC BLOOD PRESSURE: 118 MMHG | HEART RATE: 86 BPM | WEIGHT: 212.2 LBS | DIASTOLIC BLOOD PRESSURE: 74 MMHG

## 2021-03-31 DIAGNOSIS — E05.90 HYPERTHYROIDISM: ICD-10-CM

## 2021-03-31 DIAGNOSIS — E11.9 TYPE 2 DIABETES MELLITUS WITHOUT COMPLICATION, WITHOUT LONG-TERM CURRENT USE OF INSULIN (HCC): ICD-10-CM

## 2021-03-31 DIAGNOSIS — Z79.899 HIGH RISK MEDICATION USE: ICD-10-CM

## 2021-03-31 DIAGNOSIS — E05.00 GRAVES DISEASE: ICD-10-CM

## 2021-03-31 PROCEDURE — 99214 OFFICE O/P EST MOD 30 MIN: CPT | Performed by: INTERNAL MEDICINE

## 2021-03-31 PROCEDURE — 99211 OFF/OP EST MAY X REQ PHY/QHP: CPT | Performed by: INTERNAL MEDICINE

## 2021-03-31 RX ORDER — METHIMAZOLE 10 MG/1
TABLET ORAL
Qty: 270 TABLET | Refills: 1 | Status: SHIPPED | OUTPATIENT
Start: 2021-03-31 | End: 2021-05-25 | Stop reason: SDUPTHER

## 2021-03-31 RX ORDER — METFORMIN HYDROCHLORIDE 500 MG/1
TABLET, EXTENDED RELEASE ORAL
Qty: 180 TABLET | Refills: 2 | Status: SHIPPED | OUTPATIENT
Start: 2021-03-31 | End: 2021-05-25 | Stop reason: SDUPTHER

## 2021-03-31 ASSESSMENT — FIBROSIS 4 INDEX: FIB4 SCORE: 0.32

## 2021-03-31 NOTE — PROGRESS NOTES
Chief Complaint: Follow up for Hyperthyroidism secondary to Grave's disease and type 2 diabetes and history of Down syndrome.     HPI:     Sander Romero is a 24 y.o. male here for follow up of of the above medical issues.   She was previously a patient of KATHERINE Brush.    He has Hyperthyroidism first diagnosed in April 2019 with a positive thyroid uptake and scan from May 2019 showing increased 5-hour uptake of 65% c/w Graves' disease.   Previously his hyperthyroidism was difficult to control due to noncompliance with methimazole therapy.  Patient has Down syndrome and he does not like taking medications.  His mother also originally did not understand why the patient had to take methimazole.      When I last saw him on September 2020 as a virtual visit; his biochemical thyroid profile was significantly better.      His free T4 was back to normal at 1.55  free T3 was at its lowest at 4.6 compared to previous baseline labs and his TSH was gradually becoming detectable and was no longer severely suppressed.  I then lowered his methimazole from 40 mg to 30 mg daily.      Unfortunately the patient did not follow-up after the visit on September 2020.   Because he was nonadherent to follow-up I did not feel that it was safe to refill his prescriptions without concomitant labs      On follow-up today his mom admits that she lost her job and because of this she was not able to schedule follow-ups.    Thus  the patient has not been taking methimazole appropriately.       Her most recent labs show TSH is suppressed, free T4 is elevated 4.5 and free T3 is elevated at 16 on February 11, 2021          Patient also has type 2 diabetes initially discovered in August 2018.    His last A1c was much better at 5.9% February 2021    He is on Metformin  mg twice a day.    He currently denies hyperglycemic symptoms such as polyuria, polydipsia and polyphagia as well as blurry vision.     He is overdue for an eye  exam      His most recent urine microalbumin is normal      Last lipid profile show an LDL cholesterol of 63 on February 2021  with a normal total cholesterol he is not on a statin because of his young age.      Patient's medications, allergies, and social histories were reviewed and updated as appropriate.      ROS:     CONS:     No fever, no chills   EYES:     No diplopia, no blurry vision   CV:           No chest pain, no palpitations   PULM:     No SOB, no cough, no hemoptysis.   GI:            No nausea, no vomiting, no diarrhea, no constipation   ENDO:     No polyuria, no polydipsia, no heat intolerance, no cold intolerance       Past Medical History:  Problem List:  2020-01: Hyperthyroidism  2020-01: Graves disease  2020-01: High risk medication use  2019-10: Type 2 diabetes mellitus without complication, without long-  term current use of insulin ([facility])  2017-05: Obesity (BMI 35.0-39.9 without comorbidity) ([facility])  2017-05: Sepsis(995.91)  2017-05: CAP (community acquired pneumonia)  Down syndrome  Low HDL (under 40)  Vitamin D deficiency  Impaired fasting glucose  Sleep apnea  Undescended testicle      Past Surgical History:  Past Surgical History:   Procedure Laterality Date   • MYRINGOTOMY     • OTHER      surgery for undescended testies.   • TESTICLE EXPLORATION      undescended testicle   • TONSILLECTOMY     • TONSILLECTOMY AND ADENOIDECTOMY          Allergies:  Patient has no known allergies.     Social History:  Social History     Tobacco Use   • Smoking status: Never Smoker   • Smokeless tobacco: Never Used   Substance Use Topics   • Alcohol use: Yes     Alcohol/week: 0.0 oz     Comment: rarely   • Drug use: No        Family History:   family history includes Diabetes in his maternal grandfather, paternal grandfather, and paternal grandmother; Heart Disease in his mother.      PHYSICAL EXAM:   Vital signs: /74 (BP Location: Left arm, Patient Position: Sitting, BP Cuff Size: Adult)   Pulse 86    "Ht 1.651 m (5' 5\")   Wt 96.3 kg (212 lb 3.2 oz)   SpO2 98%   BMI 35.31 kg/m²   GENERAL: Obese young gentleman with Down syndrome in no apparent distress.   EYE:  No ocular asymmetry, PERRLA, No exophthalmos or lid lag  HENT: Pink, moist mucous membranes.    NECK: No thyromegaly.   CARDIOVASCULAR:  No murmurs  LUNGS: Clear breath sounds  ABDOMEN: Soft, nontender   EXTREMITIES: No clubbing, cyanosis, or edema.   NEUROLOGICAL: No gross focal motor abnormalities, there is visible tremors with both hands  LYMPH: No cervical adenopathy palpated.   SKIN: No rashes, lesions.   Monofilament testing with a 10 gram force: sensation: intact bilaterally  Visual Inspection: Feet without maceration, ulcers, or fissures.  Pedal pulses: intact bilaterally      Labs:  Lab Results   Component Value Date/Time    WBC 7.0 02/11/2021 09:31 AM    RBC 4.70 02/11/2021 09:31 AM    HEMOGLOBIN 15.2 02/11/2021 09:31 AM    MCV 95.3 02/11/2021 09:31 AM    MCH 32.3 02/11/2021 09:31 AM    MCHC 33.9 02/11/2021 09:31 AM    RDW 43.4 02/11/2021 09:31 AM    MPV 9.0 02/11/2021 09:31 AM       Lab Results   Component Value Date/Time    SODIUM 136 02/11/2021 09:31 AM    POTASSIUM 4.3 02/11/2021 09:31 AM    CHLORIDE 99 02/11/2021 09:31 AM    CO2 25 02/11/2021 09:31 AM    ANION 12.0 02/11/2021 09:31 AM    GLUCOSE 168 (H) 02/11/2021 09:31 AM    BUN 16 02/11/2021 09:31 AM    CREATININE 0.53 02/11/2021 09:31 AM    CALCIUM 9.1 02/11/2021 09:31 AM    ASTSGOT 40 02/11/2021 09:31 AM    ALTSGPT 96 (H) 02/11/2021 09:31 AM    TBILIRUBIN 0.5 02/11/2021 09:31 AM    ALBUMIN 3.7 02/11/2021 09:31 AM    ALBUMIN 3.93 12/07/2015 09:38 AM    TOTPROTEIN 7.7 02/11/2021 09:31 AM    TOTPROTEIN 8.00 12/07/2015 09:38 AM    GLOBULIN 4.0 (H) 02/11/2021 09:31 AM    AGRATIO 0.9 02/11/2021 09:31 AM       Lab Results   Component Value Date/Time    TSHULTRASEN <0.005 (L) 01/13/2020 0850     Lab Results   Component Value Date/Time    FREET4 3.08 (H) 01/13/2020 0850     Lab Results "   Component Value Date/Time    FREET3 20.30 (H) 01/13/2020 0850     No results found for: THYSTIMIG      Imaging: Please refer to thyroid uptake and scan from May 2019      ASSESSMENT/PLAN:     1. Hyperthyroidism  Previously better but now uncontrolled due to nonadherence with meds and follow  Restart methimazole 30 mg daily  Continue atenolol as needed for control palpitations  We will plan for follow-up in 2 months with repeat of TSH, free T4 and free T3 level    2. Graves disease  This is the etiology of his hyperthyroidism    3. Type 2 diabetes mellitus without complication, without long-term current use of insulin (HCC)  Fairly controlled  His A1c is better at 5.9%  Continue Metformin extended release 500 mg 2 pills daily   Recommend annual dilated eye exam  We will plan to repeat A1c in 3 to 6 months    4. High risk medication use  Patient is taking methimazole which is a high risk medication      Return in about 2 months (around 5/31/2021).      Thank you kindly for allowing me to participate in the thyroid care plan for this patient.    David Kessler MD, FACE, ECNU  01/16/20    CC:   JOSE MIGUEL Jose

## 2021-05-04 ENCOUNTER — IMMUNIZATION (OUTPATIENT)
Dept: FAMILY PLANNING/WOMEN'S HEALTH CLINIC | Facility: IMMUNIZATION CENTER | Age: 26
End: 2021-05-04
Payer: COMMERCIAL

## 2021-05-04 DIAGNOSIS — Z23 ENCOUNTER FOR VACCINATION: Primary | ICD-10-CM

## 2021-05-04 PROCEDURE — 0001A PFIZER SARS-COV-2 VACCINE: CPT

## 2021-05-04 PROCEDURE — 91300 PFIZER SARS-COV-2 VACCINE: CPT

## 2021-05-18 ENCOUNTER — HOSPITAL ENCOUNTER (OUTPATIENT)
Dept: LAB | Facility: MEDICAL CENTER | Age: 26
End: 2021-05-18
Attending: INTERNAL MEDICINE
Payer: COMMERCIAL

## 2021-05-18 DIAGNOSIS — E05.00 GRAVES DISEASE: ICD-10-CM

## 2021-05-18 DIAGNOSIS — E05.90 HYPERTHYROIDISM: ICD-10-CM

## 2021-05-18 LAB
T3FREE SERPL-MCNC: 5.66 PG/ML (ref 2–4.4)
T4 FREE SERPL-MCNC: 2.39 NG/DL (ref 0.93–1.7)
TSH SERPL DL<=0.005 MIU/L-ACNC: 0.01 UIU/ML (ref 0.38–5.33)

## 2021-05-18 PROCEDURE — 84443 ASSAY THYROID STIM HORMONE: CPT

## 2021-05-18 PROCEDURE — 84439 ASSAY OF FREE THYROXINE: CPT

## 2021-05-18 PROCEDURE — 36415 COLL VENOUS BLD VENIPUNCTURE: CPT

## 2021-05-18 PROCEDURE — 84481 FREE ASSAY (FT-3): CPT

## 2021-05-25 ENCOUNTER — OFFICE VISIT (OUTPATIENT)
Dept: ENDOCRINOLOGY | Facility: MEDICAL CENTER | Age: 26
End: 2021-05-25
Attending: NURSE PRACTITIONER
Payer: COMMERCIAL

## 2021-05-25 VITALS
WEIGHT: 215 LBS | HEART RATE: 90 BPM | HEIGHT: 65 IN | SYSTOLIC BLOOD PRESSURE: 100 MMHG | OXYGEN SATURATION: 97 % | BODY MASS INDEX: 35.82 KG/M2 | DIASTOLIC BLOOD PRESSURE: 60 MMHG

## 2021-05-25 DIAGNOSIS — E11.9 TYPE 2 DIABETES MELLITUS WITHOUT COMPLICATION, WITHOUT LONG-TERM CURRENT USE OF INSULIN (HCC): ICD-10-CM

## 2021-05-25 DIAGNOSIS — E05.90 HYPERTHYROIDISM: ICD-10-CM

## 2021-05-25 DIAGNOSIS — Q90.9 DOWN SYNDROME: ICD-10-CM

## 2021-05-25 DIAGNOSIS — E05.00 GRAVES DISEASE: ICD-10-CM

## 2021-05-25 LAB
HBA1C MFR BLD: 6.4 % (ref 0–5.6)
INT CON NEG: ABNORMAL
INT CON POS: ABNORMAL

## 2021-05-25 PROCEDURE — 83036 HEMOGLOBIN GLYCOSYLATED A1C: CPT | Performed by: NURSE PRACTITIONER

## 2021-05-25 PROCEDURE — 99213 OFFICE O/P EST LOW 20 MIN: CPT | Performed by: NURSE PRACTITIONER

## 2021-05-25 PROCEDURE — 99214 OFFICE O/P EST MOD 30 MIN: CPT | Performed by: NURSE PRACTITIONER

## 2021-05-25 RX ORDER — PROPRANOLOL HYDROCHLORIDE 10 MG/1
10 TABLET ORAL 2 TIMES DAILY
Qty: 180 TABLET | Refills: 3 | Status: SHIPPED | OUTPATIENT
Start: 2021-05-25 | End: 2021-07-09

## 2021-05-25 RX ORDER — METHIMAZOLE 10 MG/1
TABLET ORAL
Qty: 120 TABLET | Refills: 3 | Status: SHIPPED | OUTPATIENT
Start: 2021-05-25 | End: 2021-05-25 | Stop reason: SDUPTHER

## 2021-05-25 RX ORDER — METFORMIN HYDROCHLORIDE 500 MG/1
TABLET, EXTENDED RELEASE ORAL
Qty: 180 TABLET | Refills: 3 | Status: SHIPPED | OUTPATIENT
Start: 2021-05-25 | End: 2022-06-24 | Stop reason: SDUPTHER

## 2021-05-25 RX ORDER — METHIMAZOLE 10 MG/1
TABLET ORAL
Qty: 360 TABLET | Refills: 3 | Status: SHIPPED | OUTPATIENT
Start: 2021-05-25 | End: 2021-07-02

## 2021-05-25 ASSESSMENT — FIBROSIS 4 INDEX: FIB4 SCORE: 0.32

## 2021-05-25 NOTE — PROGRESS NOTES
Chief Complaint: Follow up for Hyperthyroidism secondary to Grave's disease and type 2 diabetes and history of Down syndrome.   Previous appoint with Dr. Sanford on 2021.    HPI:     Sander Romero is a 24 y.o. male here for for continued evaluation & treatment of the followin.  Hyperthyroidism  First diagnosed in 2019 with a positive thyroid uptake and scan from May 2019 showing increased 5-hour uptake of 65% c/w Graves' disease.   Previously his hyperthyroidism was difficult to control due to noncompliance with methimazole therapy.  Patient has Down syndrome and he does not like taking medications.    Currently taking methimazole 30 mg daily.  Has been off for 2 weeks in March but has been consistently back on methimazole every day for the last 6 to 8 weeks.  Patient continues to take propranolol 10 mg daily.    Patient and mom states that his palpitations are less and that he is sleeping better overall since his last appointment in March.     Ref. Range 2021 09:46   TSH Latest Ref Range: 0.380 - 5.330 uIU/mL 0.010 (L)   Free T-4 Latest Ref Range: 0.93 - 1.70 ng/dL 2.39 (H)   T3,Free Latest Ref Range: 2.00 - 4.40 pg/mL 5.66 (H)       2.  Type 2 diabetes  Diagnosed in 2018.    Currently taking Metformin  mg twice daily.    Point-of-care A1c 2021: 6.4%    Patient is overdue for an eye exam and his mom says that she will make this appointment as soon as she can.    No current ACE or ARB therapy.     Ref. Range 2021 09:31   Creatinine, Urine Latest Units: mg/dL 109.67   Microalbumin, Urine Random Latest Units: mg/dL <1.2       Statin therapy is not indicated at this time secondary to patient's age and overall normal lipid panel.     Ref. Range 2021 09:31   Cholesterol,Tot Latest Ref Range: 100 - 199 mg/dL 123   Triglycerides Latest Ref Range: 0 - 149 mg/dL 132   HDL Latest Ref Range: >=40 mg/dL 34 (A)   LDL Latest Ref Range: <100 mg/dL 63  "        Patient's medications, allergies, and social histories were reviewed and updated as appropriate.      ROS:     CONS:     No fever, no chills   EYES:     No diplopia, no blurry vision   CV:           No chest pain, no palpitations   PULM:     No SOB, no cough, no hemoptysis.   GI:            No nausea, no vomiting, no diarrhea, no constipation   ENDO:     No polyuria, no polydipsia, no heat intolerance, no cold intolerance       Past Medical History:  Problem List:  2020-01: Hyperthyroidism  2020-01: Graves disease  2020-01: High risk medication use  2019-10: Type 2 diabetes mellitus without complication, without long-  term current use of insulin ([facility])  2017-05: Obesity (BMI 35.0-39.9 without comorbidity) ([facility])  2017-05: Sepsis(995.91)  2017-05: CAP (community acquired pneumonia)  Down syndrome  Low HDL (under 40)  Vitamin D deficiency  Impaired fasting glucose  Sleep apnea  Undescended testicle      Past Surgical History:  Past Surgical History:   Procedure Laterality Date   • MYRINGOTOMY     • OTHER      surgery for undescended testies.   • TESTICLE EXPLORATION      undescended testicle   • TONSILLECTOMY     • TONSILLECTOMY AND ADENOIDECTOMY          Allergies:  Patient has no known allergies.     Social History:  Social History     Tobacco Use   • Smoking status: Never Smoker   • Smokeless tobacco: Never Used   Vaping Use   • Vaping Use: Never used   Substance Use Topics   • Alcohol use: Yes     Alcohol/week: 0.0 oz     Comment: rarely   • Drug use: No        Family History:   family history includes Diabetes in his maternal grandfather, paternal grandfather, and paternal grandmother; Heart Disease in his mother.      PHYSICAL EXAM:   Vital signs: /60   Pulse 90   Ht 1.651 m (5' 5\")   Wt 97.5 kg (215 lb)   SpO2 97%   BMI 35.78 kg/m²   GENERAL: Obese young gentleman with Down syndrome in no apparent distress.   EYE:  No ocular asymmetry, PERRLA, No exophthalmos or lid lag  HENT: Pink, moist " mucous membranes.    NECK: No thyromegaly.   CARDIOVASCULAR:  No murmurs  LUNGS: Clear breath sounds  ABDOMEN: Soft, nontender   EXTREMITIES: No clubbing, cyanosis, or edema.   NEUROLOGICAL: No gross focal motor abnormalities, there is visible tremors with both hands  LYMPH: No cervical adenopathy palpated.   SKIN: No rashes, lesions.       ASSESSMENT/PLAN:     1. Hyperthyroidism  Unstable, but improved.  TSH level still recovering but free T4 has decreased significantly.  Recommend methimazole 20 mg twice a day.  Continue atenolol as needed for control palpitations    2. Graves disease  Unstable.  This is the etiology of his hyperthyroidism    3. Type 2 diabetes mellitus without complication, without long-term current use of insulin (HCC)  Stable.  Continue Metformin extended release 500 mg 2 pills daily   Recommend annual dilated eye exam  We will plan to repeat A1c in 3 to 6 months  Recommend 2 L to 3 L of water each day.  Recommend 150 minutes of exercise each week as able.  Balanced meal planning such as [email or web address] is recommended.  Daily foot inspections are always encouraged.    4. High risk medication use  Patient is taking methimazole which is a high risk medication    Repeat labs 1 to 2 weeks prior to next appointment.  Next appointment in 6 weeks.    Thank you kindly for allowing me to participate in the thyroid care plan for this patient.    Leta Bonds, GLENNY  05/25/2021    CC:   JOSE MIGUEL Jose

## 2021-05-27 ENCOUNTER — IMMUNIZATION (OUTPATIENT)
Dept: FAMILY PLANNING/WOMEN'S HEALTH CLINIC | Facility: IMMUNIZATION CENTER | Age: 26
End: 2021-05-27
Payer: COMMERCIAL

## 2021-05-27 DIAGNOSIS — Z23 ENCOUNTER FOR VACCINATION: Primary | ICD-10-CM

## 2021-05-27 PROCEDURE — 91300 PFIZER SARS-COV-2 VACCINE: CPT | Performed by: INTERNAL MEDICINE

## 2021-05-27 PROCEDURE — 0002A PFIZER SARS-COV-2 VACCINE: CPT | Performed by: INTERNAL MEDICINE

## 2021-06-01 ENCOUNTER — PATIENT OUTREACH (OUTPATIENT)
Dept: MEDICAL GROUP | Facility: MEDICAL CENTER | Age: 26
End: 2021-06-01

## 2021-06-01 NOTE — PROGRESS NOTES
ANNUAL WELLNESS VISIT PRE-VISIT PLANNING    Patient NOT contacted to complete Annual Wellness Visit Pre-Visit Planning. Information was reviewed in chart.       1.  Reviewed notes from the last office visit: Yes    2.  If any orders were ordered or intended to be done prior to visit (i.e. 6 mos follow-up), do we have results/consult notes or has patient scheduled?   · Labs - Labs ordered, completed on 02/11/2021 and results are in chart.   Note: If patient appointment is for lab review and patient did not complete labs, check with provider if OK to reschedule patient until labs completed.  · Imaging - Imaging was not ordered at last office visit.  · Referrals - Referral ordered, patient was seen and consult notes are in chart. Care Teams updated  NO.    3.  Immunizations were updated in Epic using Reconcile Outside Information activity? Yes. Immunizations reconciled.        •  Is patient due for Tdap? NO       •  Is patient due for Shingrix? NO    4.  Patient is due for the following Health Maintenance Topics:   Health Maintenance Due   Topic Date Due   • RETINAL SCREENING  Never done   • IMM PNEUMOCOCCAL VACCINE: 0-64 Years (1 of 2 - PPSV23) 04/08/2021       5.  Reviewed/Updated the following:  · Preferred Pharmacy? Yes  · Preferred Lab? Yes  · Preferred Communication? Yes  · Allergies? Yes  · Medications? YES. Was Abstract Encounter opened and chart updated? NO  · Social History? Yes  · Family History (document living status of immediate family members and if + hx of  cancer, diabetes, hypertension, hyperlipidemia, heart attack, stroke) No    6.  Care Team Updated:       •   DME Company (gait device, O2, CPAP, etc.): N\A       •   Other Specialists (eye doctor, derm, GYN, cardiology, endo, etc): YES    7.  Patient was not advised: “This is a free wellness visit. The provider will screen for medical conditions to help you stay healthy. If you have other concerns to address you may be asked to discuss these at a  separate visit or there may be an additional fee.”     8.  AHA (Puls8) form printed for Provider? N/A       ---------------------------------------------------------------------------------------------  This Pre-Visit Planning note has been created for the Provider and Medical Assistant to review prior to the patient's office appointment.   Patient is NOT REQUIRED to follow-up on this note.

## 2021-06-07 ENCOUNTER — TELEPHONE (OUTPATIENT)
Dept: MEDICAL GROUP | Facility: MEDICAL CENTER | Age: 26
End: 2021-06-07

## 2021-06-07 ENCOUNTER — OFFICE VISIT (OUTPATIENT)
Dept: MEDICAL GROUP | Facility: MEDICAL CENTER | Age: 26
End: 2021-06-07
Payer: COMMERCIAL

## 2021-06-07 VITALS
BODY MASS INDEX: 36.15 KG/M2 | TEMPERATURE: 97.8 F | HEIGHT: 65 IN | HEART RATE: 83 BPM | SYSTOLIC BLOOD PRESSURE: 118 MMHG | WEIGHT: 217 LBS | OXYGEN SATURATION: 94 % | DIASTOLIC BLOOD PRESSURE: 70 MMHG

## 2021-06-07 DIAGNOSIS — Z00.00 ANNUAL PHYSICAL EXAM: ICD-10-CM

## 2021-06-07 DIAGNOSIS — R77.1 ABNORMALITY OF GLOBULIN: ICD-10-CM

## 2021-06-07 DIAGNOSIS — E55.9 VITAMIN D DEFICIENCY: ICD-10-CM

## 2021-06-07 DIAGNOSIS — E78.6 LOW HDL (UNDER 40): ICD-10-CM

## 2021-06-07 DIAGNOSIS — E11.9 TYPE 2 DIABETES MELLITUS WITHOUT COMPLICATION, WITHOUT LONG-TERM CURRENT USE OF INSULIN (HCC): ICD-10-CM

## 2021-06-07 DIAGNOSIS — E05.90 HYPERTHYROIDISM: ICD-10-CM

## 2021-06-07 PROCEDURE — 99395 PREV VISIT EST AGE 18-39: CPT | Performed by: NURSE PRACTITIONER

## 2021-06-07 ASSESSMENT — FIBROSIS 4 INDEX: FIB4 SCORE: 0.32

## 2021-06-08 NOTE — PROGRESS NOTES
Subjective:      Sander Romero is a 25 y.o. male who presents with PE          HPI  Seen in f/u for PE.  He is seen with his mother.  + Downs syndrome with parents as caregivers.  He was in [place] yesterday and swam a lot.  He likes riding his bike and swimming so will restart exercise now that its warm.   He is followed by [facility] for his hyperthyroidism. Initially he was not taking his meds.  He would refuse.  He is not taking his meds.  Sleeping well.  TSH is chronically low and followed by endocrinology. He is on methamazole.    Reviewed lab with mother and pt.  Last a1c that i did was very controlled at 5.7.  Stable on meds.  He is now followed by [facility].   She did a a1c last wk that was 6.4.   She will see him on 7/2/21.    LP from feb shows good trg and LDL.  HDL chronically low.   Vitamin d is low wnl.  Has been chronically low in past. He is on mvi and otc d3.  Alb/cr ratio, CBCis wnl  CMP shows elevated SGPT.      Patient Active Problem List    Diagnosis Date Noted   • Hyperthyroidism 01/16/2020   • Graves disease 01/16/2020   • Type 2 diabetes mellitus without complication, without long-term current use of insulin ([facility]) 10/22/2019   • Obesity (BMI 35.0-39.9 without comorbidity) ([facility]) 05/23/2017   • Sepsis(995.91) 05/12/2017   • CAP (community acquired pneumonia) 05/12/2017   • Low HDL (under 40)    • Vitamin D deficiency    • Sleep apnea    • Undescended testicle    • Down syndrome      Current Outpatient Medications   Medication Sig Dispense Refill   • methimazole (TAPAZOLE) 10 MG Tab Take 2 tablets by mouth twice daily 360 tablet 3   • metFORMIN ER (GLUCOPHAGE XR) 500 MG TABLET SR 24 HR Take 2 tablets by mouth once daily 180 tablet 3   • propranolol (INDERAL) 10 MG Tab Take 1 tablet by mouth 2 times a day. 180 tablet 3   • therapeutic multivitamin-minerals (THERAGRAN-M) Tab Take 1 Tab by mouth every day. 30 Tab 0   • Cholecalciferol (VITAMIN D PO) Take 3,000 Units by mouth every day.       No  "current facility-administered medications for this visit.     Patient has no known allergies.    ROS  Review of Systems   Constitutional: Negative.  Negative for fever, chills, weight loss, malaise/fatigue and diaphoresis.   HENT: Negative.  Negative for hearing loss, ear pain, nosebleeds, congestion, sore throat, neck pain, tinnitus and ear discharge.    Eyes: Negative.  Negative for blurred vision, double vision, photophobia, pain, discharge and redness.   Respiratory: Negative.  Negative for cough, hemoptysis, sputum production, shortness of breath, wheezing and stridor.    Cardiovascular: Negative.  Negative for chest pain, palpitations, orthopnea, claudication, leg swelling and PND.   Gastrointestinal: Negative.  Negative for heartburn, nausea, vomiting, abdominal pain, diarrhea, constipation, blood in stool and melena.   Genitourinary: Negative.  Negative for dysuria, urgency, frequency, incontinence, hematuria and flank pain.   Musculoskeletal: Negative.  Negative for myalgias, back pain, joint pain and falls.   Skin: Negative.  Negative for itching and rash.   Neurological: Negative.  Negative for dizziness, tingling, tremors, sensory change, speech change, focal weakness, seizures, loss of consciousness, weakness and headaches.   Endo/Heme/Allergies: Negative.  Negative for environmental allergies and polydipsia. Does not bruise/bleed easily.   Psychiatric/Behavioral: Negative.  Negative for depression, suicidal ideas, hallucinations, memory loss and substance abuse. The patient is not nervous/anxious and does not have insomnia.    All other systems reviewed and are negative.           Objective:     /70 (BP Location: Right arm, Patient Position: Sitting)   Pulse 83   Temp 36.6 °C (97.8 °F) (Temporal)   Ht 1.651 m (5' 5\")   Wt 98.4 kg (217 lb)   SpO2 94%   BMI 36.11 kg/m²      Physical Exam      Physical Exam   Vitals reviewed.  Constitutional: mother is historian.  Pt nonverbal.. appears " well-developed and well-nourished. No distress.   HENT: Head: Normocephalic and atraumatic. Bilateral tympanic membranes wnl w/o bulging.  Right Ear: External ear normal. Left Ear: External ear normal. Nose: Nose normal.  Mouth/Throat: Oropharynx is clear and moist. No oropharyngeal exudate. pretty tm wnl. Eyes: Conjunctivae and EOM are normal. Pupils are equal, round, and reactive to light. Right eye exhibits no discharge. Left eye exhibits no discharge. No scleral icterus.    Neck: Normal range of motion. Neck supple. No JVD present.   Cardiovascular: Normal rate, regular rhythm, normal heart sounds and intact distal pulses.  Exam reveals no gallop and no friction rub.  No murmur heard.  No carotid bruits   Pulmonary/Chest: Effort normal and breath sounds normal. No stridor. No respiratory distress. no wheezes or rales. exhibits no tenderness.   Abdominal: Soft. Bowel sounds are normal. exhibits no distension and no mass. No tenderness. no rebound and no guarding.   Musculoskeletal: Normal range of motion. exhibits no edema or tenderness.  pretty pedal pulses 2+.  Lymphadenopathy:  no cervical or supraclavicular adenopathy.   Neurological:  has normal reflexes. displays normal reflexes. No cranial nerve deficit. exhibits normal muscle tone. Coordination normal.   Skin: Skin is warm and dry. No rash noted. no diaphoresis. No erythema. No pallor.   Psychiatric: normal mood and affect. behavior is normal.     Assessment/Plan:     1. Annual physical exam      followed by endocrinology for his DM and hyperthyroidism.  will follow him yearly for LP and PE.  no current c/o or issues   2. Low HDL (under 40)      trg and LDL stable.  repeat yearly. call for lab lsip.  f/u for lab   3. Vitamin D deficiency      stable on otc meds   4. Type 2 diabetes mellitus without complication, without long-term current use of insulin (HCC)      followed by endocrinology.  stable on meds   5. Hyperthyroidism      still on methamazole.   followed by endocrinology

## 2021-06-08 NOTE — TELEPHONE ENCOUNTER
Please let pt know that i didn't notice until he left that his globulin was sl elevated.  i have ordered some additional testing to make sure that is nota concern.  It is nonfasting.  Have her do the lab and i will let me know if anything else needs to be done.

## 2021-06-29 ENCOUNTER — HOSPITAL ENCOUNTER (OUTPATIENT)
Dept: LAB | Facility: MEDICAL CENTER | Age: 26
End: 2021-06-29
Attending: NURSE PRACTITIONER
Payer: COMMERCIAL

## 2021-06-29 DIAGNOSIS — E05.90 HYPERTHYROIDISM: ICD-10-CM

## 2021-06-29 DIAGNOSIS — R77.1 ABNORMALITY OF GLOBULIN: ICD-10-CM

## 2021-06-29 LAB
ALBUMIN SERPL BCP-MCNC: 3.7 G/DL (ref 3.2–4.9)
ALBUMIN/GLOB SERPL: 0.9 G/DL
ALP SERPL-CCNC: 130 U/L (ref 30–99)
ALT SERPL-CCNC: 41 U/L (ref 2–50)
ANION GAP SERPL CALC-SCNC: 12 MMOL/L (ref 7–16)
AST SERPL-CCNC: 27 U/L (ref 12–45)
BILIRUB SERPL-MCNC: 0.5 MG/DL (ref 0.1–1.5)
BUN SERPL-MCNC: 8 MG/DL (ref 8–22)
CALCIUM SERPL-MCNC: 8.7 MG/DL (ref 8.5–10.5)
CHLORIDE SERPL-SCNC: 101 MMOL/L (ref 96–112)
CO2 SERPL-SCNC: 24 MMOL/L (ref 20–33)
CREAT SERPL-MCNC: 0.76 MG/DL (ref 0.5–1.4)
GLOBULIN SER CALC-MCNC: 3.9 G/DL (ref 1.9–3.5)
GLUCOSE SERPL-MCNC: 145 MG/DL (ref 65–99)
POTASSIUM SERPL-SCNC: 4.1 MMOL/L (ref 3.6–5.5)
PROT SERPL-MCNC: 7.6 G/DL (ref 6–8.2)
SODIUM SERPL-SCNC: 137 MMOL/L (ref 135–145)
T3FREE SERPL-MCNC: 3.4 PG/ML (ref 2–4.4)
T4 FREE SERPL-MCNC: 1.09 NG/DL (ref 0.93–1.7)
TSH SERPL DL<=0.005 MIU/L-ACNC: 0.01 UIU/ML (ref 0.38–5.33)

## 2021-06-29 PROCEDURE — 84443 ASSAY THYROID STIM HORMONE: CPT

## 2021-06-29 PROCEDURE — 86334 IMMUNOFIX E-PHORESIS SERUM: CPT

## 2021-06-29 PROCEDURE — 80053 COMPREHEN METABOLIC PANEL: CPT

## 2021-06-29 PROCEDURE — 83520 IMMUNOASSAY QUANT NOS NONAB: CPT | Mod: 91

## 2021-06-29 PROCEDURE — 36415 COLL VENOUS BLD VENIPUNCTURE: CPT

## 2021-06-29 PROCEDURE — 82784 ASSAY IGA/IGD/IGG/IGM EACH: CPT

## 2021-06-29 PROCEDURE — 84165 PROTEIN E-PHORESIS SERUM: CPT

## 2021-06-29 PROCEDURE — 84439 ASSAY OF FREE THYROXINE: CPT

## 2021-06-29 PROCEDURE — 84481 FREE ASSAY (FT-3): CPT

## 2021-06-29 PROCEDURE — 84155 ASSAY OF PROTEIN SERUM: CPT

## 2021-06-30 ENCOUNTER — TELEPHONE (OUTPATIENT)
Dept: MEDICAL GROUP | Facility: MEDICAL CENTER | Age: 26
End: 2021-06-30

## 2021-06-30 NOTE — TELEPHONE ENCOUNTER
----- Message from JOSE MIGUEL Jose sent at 6/29/2021  8:15 PM PDT -----  Please have pt set appointment to review and discuss treatment for labs.

## 2021-07-02 ENCOUNTER — OFFICE VISIT (OUTPATIENT)
Dept: ENDOCRINOLOGY | Facility: MEDICAL CENTER | Age: 26
End: 2021-07-02
Attending: NURSE PRACTITIONER
Payer: COMMERCIAL

## 2021-07-02 VITALS
HEART RATE: 86 BPM | SYSTOLIC BLOOD PRESSURE: 108 MMHG | OXYGEN SATURATION: 96 % | WEIGHT: 218 LBS | DIASTOLIC BLOOD PRESSURE: 66 MMHG | BODY MASS INDEX: 36.32 KG/M2 | HEIGHT: 65 IN

## 2021-07-02 DIAGNOSIS — E11.9 TYPE 2 DIABETES MELLITUS WITHOUT COMPLICATION, WITHOUT LONG-TERM CURRENT USE OF INSULIN (HCC): ICD-10-CM

## 2021-07-02 DIAGNOSIS — E05.90 HYPERTHYROIDISM: ICD-10-CM

## 2021-07-02 DIAGNOSIS — E05.00 GRAVES DISEASE: ICD-10-CM

## 2021-07-02 DIAGNOSIS — Z79.899 HIGH RISK MEDICATION USE: ICD-10-CM

## 2021-07-02 PROCEDURE — 99214 OFFICE O/P EST MOD 30 MIN: CPT | Performed by: NURSE PRACTITIONER

## 2021-07-02 PROCEDURE — 99211 OFF/OP EST MAY X REQ PHY/QHP: CPT | Performed by: NURSE PRACTITIONER

## 2021-07-02 RX ORDER — METHIMAZOLE 10 MG/1
60 TABLET ORAL DAILY
Status: DISCONTINUED | OUTPATIENT
Start: 2021-07-02 | End: 2021-07-02

## 2021-07-02 RX ORDER — METHIMAZOLE 10 MG/1
60 TABLET ORAL DAILY
Qty: 540 TABLET | Refills: 1 | Status: SHIPPED | OUTPATIENT
Start: 2021-07-02 | End: 2022-07-01 | Stop reason: SDUPTHER

## 2021-07-02 ASSESSMENT — FIBROSIS 4 INDEX: FIB4 SCORE: 0.33

## 2021-07-02 NOTE — PROGRESS NOTES
Chief Complaint: Follow up for Hyperthyroidism secondary to Grave's disease and type 2 diabetes and history of Down syndrome.     HPI:     Sander Romero is a 24 y.o. male here for for continued evaluation & treatment of the followin.  Hyperthyroidism  First diagnosed in 2019 with a positive thyroid uptake and scan from May 2019 showing increased 5-hour uptake of 65% c/w Graves' disease.   Previously his hyperthyroidism was difficult to control due to noncompliance with methimazole therapy.  Patient has Down syndrome and he does not like taking medications.    Currently taking methimazole 20 mg twice daily.  Has been off for 2 weeks in March but has been consistently back on methimazole every day for the last 6 to 8 weeks.  Patient continues to take propranolol 10 mg daily.    Patient and mom deny palpitations at this time.  Patient is still not sleeping well, 1 to 2 hours daily.  During our appointment patient is yawning and looks exhausted.  TSH level is still suppressed however it takes several weeks for it to recover.  Free T4 is appropriately decreased from 2.3 on 2021.       Ref. Range 2021 13:40   TSH Latest Ref Range: 0.380 - 5.330 uIU/mL 0.010 (L)   Free T-4 Latest Ref Range: 0.93 - 1.70 ng/dL 1.09   T3,Free Latest Ref Range: 2.00 - 4.40 pg/mL 3.40       2.  Type 2 diabetes  Diagnosed in 2018.    Currently taking Metformin  mg twice daily.    Point-of-care A1c 2021: 6.4%    Patient is overdue for an eye exam and his mom says that she will make this appointment as soon as she can.    No current ACE or ARB therapy.     Ref. Range 2021 09:31   Creatinine, Urine Latest Units: mg/dL 109.67   Microalbumin, Urine Random Latest Units: mg/dL <1.2       Statin therapy is not indicated at this time secondary to patient's age and overall normal lipid panel.     Ref. Range 2021 09:31   Cholesterol,Tot Latest Ref Range: 100 - 199 mg/dL 123   Triglycerides  "Latest Ref Range: 0 - 149 mg/dL 132   HDL Latest Ref Range: >=40 mg/dL 34 (A)   LDL Latest Ref Range: <100 mg/dL 63         Patient's medications, allergies, and social histories were reviewed and updated as appropriate.      ROS:     CONS:     No fever, no chills   EYES:     No diplopia, no blurry vision   CV:           No chest pain, no palpitations   PULM:     No SOB, no cough, no hemoptysis.   GI:            No nausea, no vomiting, no diarrhea, no constipation   ENDO:     No polyuria, no polydipsia, no heat intolerance, no cold intolerance       Past Medical History:  Problem List:  2020-01: Hyperthyroidism  2020-01: Graves disease  2019-10: Type 2 diabetes mellitus without complication, without long-  term current use of insulin ([facility])  2017-05: Obesity (BMI 35.0-39.9 without comorbidity) ([facility])  2017-05: Sepsis(995.91)  2017-05: CAP (community acquired pneumonia)  Down syndrome  Low HDL (under 40)  Vitamin D deficiency  Impaired fasting glucose  Sleep apnea  Undescended testicle      Past Surgical History:  Past Surgical History:   Procedure Laterality Date   • MYRINGOTOMY     • OTHER      surgery for undescended testies.   • TESTICLE EXPLORATION      undescended testicle   • TONSILLECTOMY     • TONSILLECTOMY AND ADENOIDECTOMY          Allergies:  Patient has no known allergies.     Social History:  Social History     Tobacco Use   • Smoking status: Never Smoker   • Smokeless tobacco: Never Used   Vaping Use   • Vaping Use: Never used   Substance Use Topics   • Alcohol use: Yes     Alcohol/week: 0.0 oz     Comment: rarely   • Drug use: No        Family History:   family history includes Diabetes in his maternal grandfather, paternal grandfather, and paternal grandmother; Heart Disease in his mother.      PHYSICAL EXAM:   Vital signs: /66 (BP Location: Left arm, Patient Position: Sitting, BP Cuff Size: Adult)   Pulse 86   Ht 1.651 m (5' 5\")   Wt 98.9 kg (218 lb)   SpO2 96%   BMI 36.28 kg/m²   GENERAL: " Obese young gentleman with Down syndrome in no apparent distress.   EYE:  No ocular asymmetry, PERRLA, No exophthalmos or lid lag  HENT: Pink, moist mucous membranes.    NECK: No thyromegaly.   CARDIOVASCULAR:  No murmurs  LUNGS: Clear breath sounds  ABDOMEN: Soft, nontender   EXTREMITIES: No clubbing, cyanosis, or edema.   NEUROLOGICAL: No gross focal motor abnormalities, there is visible tremors with both hands  LYMPH: No cervical adenopathy palpated.   SKIN: No rashes, lesions.       ASSESSMENT/PLAN:     1. Hyperthyroidism  Unstable, but improved.  TSH level still recovering but free T4 has decreased significantly.  Recommend methimazole 60 mg daily.  Continue atenolol as needed for control palpitations.  Patient's mom requests methimazole and a single daily dose as it is less confusing for the patient and for other family members that may be needing to assist with medication management.    2. Graves disease  Unstable.  This is the etiology of his hyperthyroidism.  As per plan #1    3. Type 2 diabetes mellitus without complication, without long-term current use of insulin (HCC)  Stable.  Continue Metformin extended release 500 mg 2 pills daily   Recommend annual dilated eye exam  We will plan to repeat A1c in 3 to 6 months  Recommend 2 L to 3 L of water each day.  Recommend 150 minutes of exercise each week as able.  Balanced meal planning such as [email or web address] is recommended.  Daily foot inspections are always encouraged.    4. High risk medication use  Patient is taking methimazole which is a high risk medication    Repeat labs 1 to 2 weeks prior to next appointment.  Next appointment in 6 weeks.    Thank you kindly for allowing me to participate in the thyroid care plan for this patient.    GLENNY Rios  06/30/2021    CC:   JOSE MIGUEL Jose

## 2021-07-03 LAB
ALBUMIN SERPL ELPH-MCNC: 3.52 G/DL (ref 3.75–5.01)
ALPHA1 GLOB SERPL ELPH-MCNC: 0.28 G/DL (ref 0.19–0.46)
ALPHA2 GLOB SERPL ELPH-MCNC: 0.77 G/DL (ref 0.48–1.05)
B-GLOBULIN SERPL ELPH-MCNC: 1.07 G/DL (ref 0.48–1.1)
EER MONOCLONAL PROTEIN AND FLC, SERUM Q5224: ABNORMAL
GAMMA GLOB SERPL ELPH-MCNC: 1.66 G/DL (ref 0.62–1.51)
IGA SERPL-MCNC: 637 MG/DL (ref 68–408)
IGG SERPL-MCNC: 1755 MG/DL (ref 768–1632)
IGM SERPL-MCNC: 36 MG/DL (ref 35–263)
INTERPRETATION SERPL IFE-IMP: ABNORMAL
INTERPRETATION SERPL IFE-IMP: ABNORMAL
KAPPA LC FREE SER-MCNC: 41.83 MG/L (ref 3.3–19.4)
KAPPA LC FREE/LAMBDA FREE SER NEPH: 1.41 {RATIO} (ref 0.26–1.65)
LAMBDA LC FREE SERPL-MCNC: 29.64 MG/L (ref 5.71–26.3)
PROT SERPL-MCNC: 7.3 G/DL (ref 6.3–8.2)

## 2021-07-08 DIAGNOSIS — E05.90 HYPERTHYROIDISM: ICD-10-CM

## 2021-07-09 RX ORDER — PROPRANOLOL HYDROCHLORIDE 10 MG/1
TABLET ORAL
Qty: 85 TABLET | Refills: 0 | Status: SHIPPED | OUTPATIENT
Start: 2021-07-09 | End: 2022-01-10

## 2022-05-19 ENCOUNTER — OFFICE VISIT (OUTPATIENT)
Dept: INTERNAL MEDICINE | Facility: OTHER | Age: 27
End: 2022-05-19
Payer: MEDICAID

## 2022-05-19 VITALS
DIASTOLIC BLOOD PRESSURE: 73 MMHG | TEMPERATURE: 98.6 F | HEIGHT: 63 IN | HEART RATE: 96 BPM | WEIGHT: 211.6 LBS | OXYGEN SATURATION: 92 % | BODY MASS INDEX: 37.49 KG/M2 | SYSTOLIC BLOOD PRESSURE: 128 MMHG

## 2022-05-19 DIAGNOSIS — R06.02 SHORTNESS OF BREATH: ICD-10-CM

## 2022-05-19 DIAGNOSIS — E66.9 OBESITY (BMI 35.0-39.9 WITHOUT COMORBIDITY): ICD-10-CM

## 2022-05-19 DIAGNOSIS — E05.00 GRAVES DISEASE: ICD-10-CM

## 2022-05-19 DIAGNOSIS — Q90.9 DOWN SYNDROME: ICD-10-CM

## 2022-05-19 DIAGNOSIS — E11.9 TYPE 2 DIABETES MELLITUS WITHOUT COMPLICATION, WITHOUT LONG-TERM CURRENT USE OF INSULIN (HCC): ICD-10-CM

## 2022-05-19 PROCEDURE — 99204 OFFICE O/P NEW MOD 45 MIN: CPT | Mod: GC | Performed by: STUDENT IN AN ORGANIZED HEALTH CARE EDUCATION/TRAINING PROGRAM

## 2022-05-19 ASSESSMENT — FIBROSIS 4 INDEX: FIB4 SCORE: 0.35

## 2022-05-19 NOTE — PATIENT INSTRUCTIONS
We have placed a new referral to Endocrinology. We will attempt to find a provider who accepts your insurance.  Lab work as soon as possible.  Echocardiogram has been ordered.  Follow-up in 5 weeks.

## 2022-05-19 NOTE — PROGRESS NOTES
Chief Complaint   Patient presents with   • New Patient   • Diabetes       HISTORY OF PRESENT ILLNESS: Sander Romero is a 26 y.o. male new patient who presents today for establishment of care.  Here with father (), mother (Maria Esther).  PMHx significant for Down syndrome, Graves' disease (previously following with endocrinology), T2DM, HTN, obesity.  Patient is nonverbal but responds to commands.    1. Graves disease  Diagnosed in April 2019.  May 2019 thyroid uptake scan consistent with Graves' disease.  Was following with endocrinology until last year.  Switch in insurance disallowed previous provider.  Per chart review, history of methimazole noncompliance.  He does not like taking medications.  Has been on methimazole as high as 60 Mg daily, currently 30 Mg daily at the time of presentation.  No recent labs.    2. Type 2 diabetes mellitus without complication, without long-term current use of insulin ([facility])  Last A1c (May 2021) 6.4%.  Currently on metformin 500 daily.  No up-to-date labs on file, we discussed obtaining new laboratory work.  Parents amenable.  No current ACE or ARB therapy.    3. Down syndrome  Nonverbal.  Parents report episodes in which he indicates some sort of chest pain, typically after exertion.  Mother has history of aortic valve disease.  Previous echocardiogram performed in 2017 showed technically difficult study, bicuspid valve not ruled out.    4. Obesity (BMI 35.0-39.9 without comorbidity) ([facility])  Discussed healthy dietary habits with parents and encouraged exercise.    5. Shortness of breath  See above in problem #3.      Past Medical History:   Diagnosis Date   • CAP (community acquired pneumonia) 5/12/2017   • Down syndrome     trisomy 21   • Graves disease 1/16/2020   • Hyperthyroidism 1/16/2020   • Low HDL (under 40)    • Obesity (BMI 35.0-39.9 without comorbidity) ([facility]) 5/23/2017   • Sepsis(995.91) 5/12/2017   • Sleep apnea     work up done as a child.  no tx   • Type 2  diabetes mellitus without complication, without long-term current use of insulin ([facility]) 10/22/2019   • Undescended testicle    • Vitamin D deficiency        Patient Active Problem List    Diagnosis Date Noted   • Hyperthyroidism 01/16/2020   • Graves disease 01/16/2020   • Type 2 diabetes mellitus without complication, without long-term current use of insulin ([facility]) 10/22/2019   • Obesity (BMI 35.0-39.9 without comorbidity) ([facility]) 05/23/2017   • Sepsis(995.91) 05/12/2017   • CAP (community acquired pneumonia) 05/12/2017   • Low HDL (under 40)    • Vitamin D deficiency    • Sleep apnea    • Undescended testicle    • Down syndrome        Allergies:Patient has no known allergies.    Current Outpatient Medications   Medication Sig Dispense Refill   • propranolol (INDERAL) 10 MG Tab Take 1 Tablet by mouth 3 times a day. Please make an appointment for any future refills 90 Tablet 0   • methimazole (TAPAZOLE) 10 MG Tab Take 6 Tablets by mouth every day. Methimazole 60mg daily (Patient taking differently: Take 30 mg by mouth every day.) 540 tablet 1   • metFORMIN ER (GLUCOPHAGE XR) 500 MG TABLET SR 24 HR Take 2 tablets by mouth once daily 180 tablet 3   • therapeutic multivitamin-minerals (THERAGRAN-M) Tab Take 1 Tab by mouth every day. 30 Tab 0   • Cholecalciferol (VITAMIN D PO) Take 3,000 Units by mouth every day.       No current facility-administered medications for this visit.       Social History     Tobacco Use   • Smoking status: Never Smoker   • Smokeless tobacco: Never Used   Vaping Use   • Vaping Use: Never used   Substance Use Topics   • Alcohol use: Yes     Alcohol/week: 0.0 oz     Comment: rarely   • Drug use: No       Family History   Problem Relation Age of Onset   • Hypertension Mother    • Heart Disease Mother         congenital heart defect   • Arthritis Mother    • Diabetes Father    • Arthritis Maternal Grandmother    • Stroke Maternal Grandfather    • Diabetes Maternal Grandfather    • Diabetes Paternal  "Grandmother    • Cancer Paternal Grandfather         Prostate   • Diabetes Paternal Grandfather          Review of Systems   ROS  As above in HPI.  All other systems reviewed and negative.    Exam:  /73 (BP Location: Right arm, Patient Position: Sitting, BP Cuff Size: Adult)   Pulse 96   Temp 37 °C (98.6 °F) (Temporal)   Ht 1.595 m (5' 2.8\")   Wt 96 kg (211 lb 9.6 oz)   SpO2 92%  Body mass index is 37.73 kg/m².    Constitutional:  NAD, well appearing.  Down syndrome.  HEENT:   NC/AT  Cardiovascular: RRR.   No m/r/g. No carotid bruits.       Lungs:   CTAB, no w/r/r, no respiratory distress.  Abdomen: Soft, NT/ND + BS, no masses, no suprapubic tenderness, no hepatomegaly.  Extremities:  2+ DP and radial pulses bilaterally.  No c/c/e.  Skin:  Warm and dry.    Neurologic: Alert & oriented x 3, CN II-XII grossly intact, strength and sensation grossly intact.  No focal deficits noted.  Psychiatric:  Affect normal, mood normal, judgment normal.    Assessment/Plan:     1. Graves disease  Update lab work.  Titrate methimazole based on results.  Will place new referral to endocrinology with specification to find provider who accepts their insurance.  - TSH+FREE T4  - Referral to Endocrinology    2. Type 2 diabetes mellitus without complication, without long-term current use of insulin (HCC)  Update laboratory work.  Consider ACE or ARB therapy based on results.  - CBC WITH DIFFERENTIAL; Future  - Comp Metabolic Panel; Future  - MICROALBUMIN CREAT RATIO URINE; Future  - HEMOGLOBIN A1C; Future  - Referral to Endocrinology    3. Down syndrome  Will obtain new echocardiogram for further structure and function elucidation.  - EC-ECHOCARDIOGRAM COMPLETE W/O CONT; Future    4. Obesity (BMI 35.0-39.9 without comorbidity) ([facility])  Update laboratory work.  Encouraged diet and exercise.  Patient is amenable.  - CBC WITH DIFFERENTIAL; Future  - Lipid Profile; Future    5. Shortness of breath  Echocardiogram as above in #3.  - " EC-ECHOCARDIOGRAM COMPLETE W/O CONT; Future      All imaging results and lab results and consult notes are reviewed at this visit.  Followup: Return in about 5 weeks (around 6/23/2022).

## 2022-06-14 DIAGNOSIS — R06.02 SHORTNESS OF BREATH: ICD-10-CM

## 2022-06-14 DIAGNOSIS — Q90.9 DOWN SYNDROME: ICD-10-CM

## 2022-06-24 ENCOUNTER — OFFICE VISIT (OUTPATIENT)
Dept: INTERNAL MEDICINE | Facility: OTHER | Age: 27
End: 2022-06-24
Payer: MEDICAID

## 2022-06-24 VITALS
HEIGHT: 63 IN | WEIGHT: 209.6 LBS | DIASTOLIC BLOOD PRESSURE: 70 MMHG | OXYGEN SATURATION: 95 % | TEMPERATURE: 98.6 F | BODY MASS INDEX: 37.14 KG/M2 | HEART RATE: 91 BPM | SYSTOLIC BLOOD PRESSURE: 102 MMHG

## 2022-06-24 DIAGNOSIS — E11.9 TYPE 2 DIABETES MELLITUS WITHOUT COMPLICATION, WITHOUT LONG-TERM CURRENT USE OF INSULIN (HCC): ICD-10-CM

## 2022-06-24 DIAGNOSIS — E66.9 OBESITY (BMI 30-39.9): ICD-10-CM

## 2022-06-24 DIAGNOSIS — Q90.9 DOWN SYNDROME: ICD-10-CM

## 2022-06-24 DIAGNOSIS — E05.90 HYPERTHYROIDISM: ICD-10-CM

## 2022-06-24 DIAGNOSIS — E05.00 GRAVES DISEASE: ICD-10-CM

## 2022-06-24 DIAGNOSIS — E66.9 OBESITY (BMI 35.0-39.9 WITHOUT COMORBIDITY): ICD-10-CM

## 2022-06-24 PROBLEM — A41.9 SEPSIS (HCC): Status: RESOLVED | Noted: 2017-05-12 | Resolved: 2022-06-24

## 2022-06-24 PROCEDURE — 99214 OFFICE O/P EST MOD 30 MIN: CPT | Mod: GC | Performed by: STUDENT IN AN ORGANIZED HEALTH CARE EDUCATION/TRAINING PROGRAM

## 2022-06-24 RX ORDER — PROPRANOLOL HYDROCHLORIDE 10 MG/1
10 TABLET ORAL 3 TIMES DAILY
Qty: 90 TABLET | Refills: 0 | Status: SHIPPED | OUTPATIENT
Start: 2022-06-24 | End: 2022-12-05

## 2022-06-24 RX ORDER — METFORMIN HYDROCHLORIDE 500 MG/1
TABLET, EXTENDED RELEASE ORAL
Qty: 270 TABLET | Refills: 1 | Status: SHIPPED | OUTPATIENT
Start: 2022-06-24 | End: 2022-07-01 | Stop reason: SDUPTHER

## 2022-06-24 ASSESSMENT — FIBROSIS 4 INDEX: FIB4 SCORE: 0.36

## 2022-06-24 NOTE — PROGRESS NOTES
No chief complaint on file.      HISTORY OF PRESENT ILLNESS: Sander Romero is a 26 y.o. male established patient who presents today for follow-up.  Here with mother (Maria Esther).  PMHx significant for Down syndrome, Graves' disease (previously following with endocrinology), T2DM, HTN, obesity.  Patient is nonverbal but responds to commands.    1. Down syndrome  No change from previous.  We obtained echocardiogram to rule out intrinsic heart disease and discussed results.  Echocardiogram with EF 65%, otherwise normal findings.    2. Obesity (BMI 35.0-39.9 without comorbidity) ([facility])  Discussed ideas for improved diet.  Maria Esther is amenable to nutrition referral for ideas.    3. Type 2 diabetes mellitus without complication, without long-term current use of insulin ([facility])  Currently on metformin 500 twice daily.  A1c 6.9 in office today.  We discussed need for more movement, improve diet as above.  We also discussed metformin increased.    4. Graves disease  Currently taking methimazole 30 Mg daily.  Referral for endocrinology was placed at previous encounter, they have an appointment to see endocrinologist (Dr. Sanford) on July 1.  TSH 0.011, free T4 1.58.     5. Type 2 diabetes mellitus without complication, without long-term current use of insulin ([facility])  Comments: refilled metformin.  do lab and f/u with mother with results.  f/u w/[facility] as sched      Past Medical History:   Diagnosis Date   • CAP (community acquired pneumonia) 5/12/2017   • Down syndrome     trisomy 21   • Graves disease 1/16/2020   • Hyperthyroidism 1/16/2020   • Low HDL (under 40)    • Obesity (BMI 35.0-39.9 without comorbidity) ([facility]) 5/23/2017   • Sepsis(995.91) 5/12/2017   • Sleep apnea     work up done as a child.  no tx   • Type 2 diabetes mellitus without complication, without long-term current use of insulin ([facility]) 10/22/2019   • Undescended testicle    • Vitamin D deficiency        Patient Active Problem List    Diagnosis Date Noted    • Obesity (BMI 30-39.9) 06/24/2022   • Hyperthyroidism 01/16/2020   • Graves disease 01/16/2020   • Type 2 diabetes mellitus without complication, without long-term current use of insulin ([facility]) 10/22/2019   • Obesity (BMI 35.0-39.9 without comorbidity) ([facility]) 05/23/2017   • CAP (community acquired pneumonia) 05/12/2017   • Low HDL (under 40)    • Vitamin D deficiency    • Sleep apnea    • Undescended testicle    • Down syndrome        Allergies:Patient has no known allergies.    Current Outpatient Medications   Medication Sig Dispense Refill   • metFORMIN ER (GLUCOPHAGE XR) 500 MG TABLET SR 24 HR Take 2 tablets in the morning, 1 tablet in the evening. 270 Tablet 1   • propranolol (INDERAL) 10 MG Tab Take 1 Tablet by mouth 3 times a day. Please make an appointment for any future refills 90 Tablet 0   • methimazole (TAPAZOLE) 10 MG Tab Take 6 Tablets by mouth every day. Methimazole 60mg daily (Patient taking differently: Take 30 mg by mouth every day.) 540 tablet 1   • therapeutic multivitamin-minerals (THERAGRAN-M) Tab Take 1 Tab by mouth every day. 30 Tab 0   • Cholecalciferol (VITAMIN D PO) Take 3,000 Units by mouth every day.       No current facility-administered medications for this visit.       Social History     Tobacco Use   • Smoking status: Never Smoker   • Smokeless tobacco: Never Used   Vaping Use   • Vaping Use: Never used   Substance Use Topics   • Alcohol use: Yes     Alcohol/week: 0.0 oz     Comment: rarely   • Drug use: No       Family History   Problem Relation Age of Onset   • Hypertension Mother    • Heart Disease Mother         congenital heart defect   • Arthritis Mother    • Diabetes Father    • Arthritis Maternal Grandmother    • Stroke Maternal Grandfather    • Diabetes Maternal Grandfather    • Diabetes Paternal Grandmother    • Cancer Paternal Grandfather         Prostate   • Diabetes Paternal Grandfather          Review of Systems   ROS  As above in HPI.  All other systems reviewed and  "negative.    Exam:  /70 (BP Location: Left arm, Patient Position: Sitting, BP Cuff Size: Adult)   Pulse 91   Temp 37 °C (98.6 °F) (Temporal)   Ht 1.595 m (5' 2.8\")   Wt 95.1 kg (209 lb 9.6 oz)   SpO2 95%  Body mass index is 37.37 kg/m².    Constitutional:  NAD, well appearing, nonverbal.  HEENT:   NC/AT  Cardiovascular: RRR.   No m/r/g. No carotid bruits.       Lungs:   CTAB, no w/r/r, no respiratory distress.  Abdomen: Soft, NT/ND + BS, no masses, no suprapubic tenderness, no hepatomegaly.  Extremities:  2+ DP and radial pulses bilaterally.  No c/c/e.  Skin:  Warm and dry.    Neurologic: Alert & oriented x 3, CN II-XII grossly intact, strength and sensation grossly intact.  No focal deficits noted.    Assessment/Plan:     1. Down syndrome  Echocardiogram completed as above in HPI.    2. Obesity (BMI 35.0-39.9 without comorbidity) ([facility])  - Patient identified as having weight management issue.  Appropriate orders and counseling given.  - Referral to Nutrition Services    3. Type 2 diabetes mellitus without complication, without long-term current use of insulin ([facility])  Metformin increased to 1000 mg in the morning, 500 in the evening.  Endocrinology follow-up July 1.  Dr. Sanford.  - metFORMIN ER (GLUCOPHAGE XR) 500 MG TABLET SR 24 HR; Take 2 tablets in the morning, 1 tablet in the evening.  Dispense: 270 Tablet; Refill: 1    4. Graves disease  Endocrinology follow-up as above.  Will not make changes to methimazole at this time.          All imaging results and lab results and consult notes are reviewed at this visit.  Followup: Return in about 5 weeks (around 7/29/2022).    "

## 2022-07-01 ENCOUNTER — OFFICE VISIT (OUTPATIENT)
Dept: ENDOCRINOLOGY | Facility: MEDICAL CENTER | Age: 27
End: 2022-07-01
Attending: INTERNAL MEDICINE
Payer: MEDICAID

## 2022-07-01 VITALS
SYSTOLIC BLOOD PRESSURE: 112 MMHG | HEART RATE: 88 BPM | DIASTOLIC BLOOD PRESSURE: 70 MMHG | OXYGEN SATURATION: 95 % | WEIGHT: 212 LBS | HEIGHT: 65 IN | BODY MASS INDEX: 35.32 KG/M2

## 2022-07-01 DIAGNOSIS — E05.00 GRAVES DISEASE: ICD-10-CM

## 2022-07-01 DIAGNOSIS — Z79.899 HIGH RISK MEDICATION USE: ICD-10-CM

## 2022-07-01 DIAGNOSIS — E11.9 TYPE 2 DIABETES MELLITUS WITHOUT COMPLICATION, WITHOUT LONG-TERM CURRENT USE OF INSULIN (HCC): ICD-10-CM

## 2022-07-01 DIAGNOSIS — E05.90 HYPERTHYROIDISM: ICD-10-CM

## 2022-07-01 PROCEDURE — 92250 FUNDUS PHOTOGRAPHY W/I&R: CPT | Performed by: INTERNAL MEDICINE

## 2022-07-01 PROCEDURE — 99212 OFFICE O/P EST SF 10 MIN: CPT | Performed by: INTERNAL MEDICINE

## 2022-07-01 PROCEDURE — 99214 OFFICE O/P EST MOD 30 MIN: CPT | Performed by: INTERNAL MEDICINE

## 2022-07-01 RX ORDER — METFORMIN HYDROCHLORIDE 500 MG/1
TABLET, EXTENDED RELEASE ORAL
Qty: 360 TABLET | Refills: 3 | Status: SHIPPED | OUTPATIENT
Start: 2022-07-01 | End: 2023-05-02 | Stop reason: SDUPTHER

## 2022-07-01 RX ORDER — METHIMAZOLE 10 MG/1
30 TABLET ORAL DAILY
Qty: 270 TABLET | Refills: 2 | Status: SHIPPED | OUTPATIENT
Start: 2022-07-01 | End: 2022-11-01 | Stop reason: SDUPTHER

## 2022-07-01 ASSESSMENT — PATIENT HEALTH QUESTIONNAIRE - PHQ9: CLINICAL INTERPRETATION OF PHQ2 SCORE: 0

## 2022-07-01 ASSESSMENT — FIBROSIS 4 INDEX: FIB4 SCORE: 0.36

## 2022-07-01 NOTE — PROGRESS NOTES
RN-CDE Note    Subjective:   Endocrinology Clinic Progress Note  PCP: Franklyn Mlaave M.D.    HPI:  Sander Romero is a 26 y.o. old patient who is seen today for review of Type 2 Diabetes, Grave's disease, and Down Syndrome.  Recent changes in health: Health good. Complaining of left leg hurting around the ankle.  DM:   Last A1c:   Lab Results   Component Value Date/Time    HBA1C 6.9 (H) 06/20/2022 06:57 AM    HBA1C 6.4 (A) 05/25/2021 10:30 AM      Previous A1c was 6.4 on 5/25/21  A1C GOAL: < 7    Diabetes Medications:   Metformin  mg 2 q am and 1 q pm      Exercise: Riding bike  Diet: cereal with milk, then 2 eggs and toast, Lunch and dinner is protein, vegetables, and carbohydrates.  Likes to snack on cereal and fruit.  Patient's body mass index is unknown because there is no height or weight on file. Exercise and nutrition counseling were performed at this visit.    Glucose monitoring frequency: Testing fasting  110-145  Hypoglycemic episodes: no  Last Retinal Exam: on file and up-to-date  Daily Foot Exam: Yes   Foot Exam:  Monofilament: done  Monofilament testing with a 10 gram force: sensation intact: intact bilaterally  Visual Inspection: Feet without maceration, ulcers, fissures.  Pedal pulses: intact bilaterally   Lab Results   Component Value Date/Time    MALBCRT see below 02/11/2021 09:31 AM    MICROALBUR <1.2 02/11/2021 09:31 AM    MICRALB <3.0 06/20/2022 06:57 AM     He  reports that he has never smoked. He has never used smokeless tobacco.      Plan:     Discussed and educated on:   - All medications, side effects and compliance (discussed carefully)  - Annual eye examinations at Ophthalmology  - Home glucose monitoring emphasized  - Weight control and daily exercise    Recommended medication changes: Increase Metformin  mg to 2 BIS.  Encouraged less cereal and fruit.

## 2022-07-01 NOTE — PROGRESS NOTES
Chief Complaint: Follow up for Hyperthyroidism secondary to Grave's disease and type 2 diabetes and history of Down syndrome.     HPI:     Sander Romero is a 26 y.o. male here for follow up of of the above medical issues.   She was previously a patient of KATHERINE Brush.    He has Hyperthyroidism first diagnosed in April 2019 with a positive thyroid uptake and scan from May 2019 showing increased 5-hour uptake of 65% c/w Graves' disease.   Previously his hyperthyroidism was difficult to control due to noncompliance with methimazole therapy.  Patient has Down syndrome and he does not like taking medications.  His mother also originally did not understand why the patient had to take methimazole.      Currently he is taking methimazole 30 mg daily  He reports better compliance  Her TSH is slightly low at 0.011 but her free T4 is normal on June 20, 2022          Patient also has type 2 diabetes initially discovered in August 2018.    It is notable that his A1c has been trending up on serial monitoring    His A1c was 6.9% on June 20, 2022    He is on Metformin  mg twice a day.  Today we talked about increasing his metformin      His LDL cholesterol was 93 with triglycerides 164 in June 20, 2022    He does not have diabetic kidney disease  U ACR was less than 30 on June 20, 2022    We talked about getting an eye exam today          Patient's medications, allergies, and social histories were reviewed and updated as appropriate.      ROS:     CONS:     No fever, no chills   EYES:     No diplopia, no blurry vision   CV:           No chest pain, no palpitations   PULM:     No SOB, no cough, no hemoptysis.   GI:            No nausea, no vomiting, no diarrhea, no constipation   ENDO:     No polyuria, no polydipsia, no heat intolerance, no cold intolerance       Past Medical History:  Problem List:  2022-07: High risk medication use  2022-06: Obesity (BMI 30-39.9)  2020-01: Hyperthyroidism  2020-01: Graves  "disease  2019-10: Type 2 diabetes mellitus without complication, without long-  term current use of insulin ([facility])  2017-05: Obesity (BMI 35.0-39.9 without comorbidity) ([facility])  2017-05: Sepsis(995.91)  2017-05: CAP (community acquired pneumonia)  Down syndrome  Low HDL (under 40)  Vitamin D deficiency  Impaired fasting glucose  Sleep apnea  Undescended testicle      Past Surgical History:  Past Surgical History:   Procedure Laterality Date   • MYRINGOTOMY     • OTHER      surgery for undescended testies.   • TESTICLE EXPLORATION      undescended testicle   • TONSILLECTOMY     • TONSILLECTOMY AND ADENOIDECTOMY          Allergies:  Patient has no known allergies.     Social History:  Social History     Tobacco Use   • Smoking status: Never Smoker   • Smokeless tobacco: Never Used   Vaping Use   • Vaping Use: Never used   Substance Use Topics   • Alcohol use: Yes     Alcohol/week: 0.0 oz     Comment: rarely   • Drug use: No        Family History:   family history includes Arthritis in his maternal grandmother and mother; Cancer in his paternal grandfather; Diabetes in his father, maternal grandfather, paternal grandfather, and paternal grandmother; Heart Disease in his mother; Hypertension in his mother; Stroke in his maternal grandfather.      PHYSICAL EXAM:   Vital signs: /70   Pulse 88   Ht 1.651 m (5' 5\")   Wt 96.2 kg (212 lb)   SpO2 95%   BMI 35.28 kg/m²   GENERAL: Obese young gentleman with Down syndrome in no apparent distress.   EYE:  No ocular asymmetry, PERRLA, No exophthalmos or lid lag  HENT: Pink, moist mucous membranes.    NECK: No thyromegaly.   CARDIOVASCULAR:  No murmurs  LUNGS: Clear breath sounds  ABDOMEN: Soft, nontender   EXTREMITIES: No clubbing, cyanosis, or edema.   NEUROLOGICAL: No gross focal motor abnormalities, there is visible tremors with both hands  LYMPH: No cervical adenopathy palpated.   SKIN: No rashes, lesions.       Labs:  Lab Results   Component Value Date/Time    WBC 6.2 " 06/20/2022 06:57 AM    WBC 7.0 02/11/2021 09:31 AM    RBC 4.86 06/20/2022 06:57 AM    RBC 4.70 02/11/2021 09:31 AM    HEMOGLOBIN 16.0 06/20/2022 06:57 AM    HEMOGLOBIN 15.2 02/11/2021 09:31 AM    MCV 97 06/20/2022 06:57 AM    MCV 95.3 02/11/2021 09:31 AM    MCH 32.9 06/20/2022 06:57 AM    MCH 32.3 02/11/2021 09:31 AM    MCHC 34.0 06/20/2022 06:57 AM    MCHC 33.9 02/11/2021 09:31 AM    RDW 13.3 06/20/2022 06:57 AM    RDW 43.4 02/11/2021 09:31 AM    MPV 9.0 02/11/2021 09:31 AM       Lab Results   Component Value Date/Time    SODIUM 136 06/20/2022 06:57 AM    SODIUM 137 06/29/2021 01:33 PM    POTASSIUM 4.4 06/20/2022 06:57 AM    POTASSIUM 4.1 06/29/2021 01:33 PM    CHLORIDE 101 06/20/2022 06:57 AM    CHLORIDE 101 06/29/2021 01:33 PM    CO2 21 06/20/2022 06:57 AM    CO2 24 06/29/2021 01:33 PM    ANION 12.0 06/29/2021 01:33 PM    GLUCOSE 117 (H) 06/20/2022 06:57 AM    GLUCOSE 145 (H) 06/29/2021 01:33 PM    BUN 13 06/20/2022 06:57 AM    BUN 8 06/29/2021 01:33 PM    CREATININE 0.86 06/20/2022 06:57 AM    CREATININE 0.76 06/29/2021 01:33 PM    CALCIUM 8.9 06/20/2022 06:57 AM    CALCIUM 8.7 06/29/2021 01:33 PM    ASTSGOT 25 06/20/2022 06:57 AM    ASTSGOT 27 06/29/2021 01:33 PM    ALTSGPT 41 06/20/2022 06:57 AM    ALTSGPT 41 06/29/2021 01:33 PM    TBILIRUBIN 0.5 06/20/2022 06:57 AM    TBILIRUBIN 0.5 06/29/2021 01:33 PM    ALBUMIN 3.9 (L) 06/20/2022 06:57 AM    ALBUMIN 3.52 (L) 06/29/2021 01:33 PM    ALBUMIN 3.7 06/29/2021 01:33 PM    TOTPROTEIN 7.5 06/20/2022 06:57 AM    TOTPROTEIN 7.3 06/29/2021 01:33 PM    TOTPROTEIN 7.6 06/29/2021 01:33 PM    GLOBULIN 3.6 06/20/2022 06:57 AM    GLOBULIN 3.9 (H) 06/29/2021 01:33 PM    AGRATIO 1.1 (L) 06/20/2022 06:57 AM    AGRATIO 0.9 06/29/2021 01:33 PM       Lab Results   Component Value Date/Time    TSHULTRASEN <0.005 (L) 01/13/2020 0850     Lab Results   Component Value Date/Time    FREET4 3.08 (H) 01/13/2020 0850     Lab Results   Component Value Date/Time    FREET3 20.30 (H)  01/13/2020 0850     No results found for: THYSTIMIG      Imaging: Please refer to thyroid uptake and scan from May 2019      ASSESSMENT/PLAN:     1. Hyperthyroidism  Controlled  Continue methimazole 30 mg daily  Follow-up in 4 months with repeat of TSH free T4 and free T3 level    2. Graves disease  This is the etiology of his hyperthyroidism    3. Type 2 diabetes mellitus without complication, without long-term current use of insulin ([facility])  Fairly controlled  A1c is higher at 6.4%  Increase metformin to 1000 mg twice a day  He is up-to-date with his labs  Eye exam was done today  Follow-up in 4 months with repeat of A1c    4. High risk medication use  Patient is taking methimazole which is a high risk medication      Return in about 4 months (around 11/1/2022).      Thank you kindly for allowing me to participate in the thyroid care plan for this patient.    David Kessler MD, FACE, NU  01/16/20    CC:   JOSE MIGUEL Jose

## 2022-07-13 LAB — RETINAL SCREEN: NEGATIVE

## 2022-09-19 ENCOUNTER — OFFICE VISIT (OUTPATIENT)
Dept: INTERNAL MEDICINE | Facility: OTHER | Age: 27
End: 2022-09-19
Payer: MEDICAID

## 2022-09-19 VITALS
SYSTOLIC BLOOD PRESSURE: 109 MMHG | HEIGHT: 65 IN | WEIGHT: 213.6 LBS | HEART RATE: 78 BPM | TEMPERATURE: 97.6 F | OXYGEN SATURATION: 94 % | DIASTOLIC BLOOD PRESSURE: 70 MMHG | BODY MASS INDEX: 35.59 KG/M2

## 2022-09-19 DIAGNOSIS — E05.90 HYPERTHYROIDISM: ICD-10-CM

## 2022-09-19 DIAGNOSIS — E11.9 TYPE 2 DIABETES MELLITUS WITHOUT COMPLICATION, WITHOUT LONG-TERM CURRENT USE OF INSULIN (HCC): ICD-10-CM

## 2022-09-19 DIAGNOSIS — E05.00 GRAVES DISEASE: ICD-10-CM

## 2022-09-19 DIAGNOSIS — Z13.228 SCREENING FOR METABOLIC DISORDER: ICD-10-CM

## 2022-09-19 DIAGNOSIS — Z23 NEED FOR VACCINATION: ICD-10-CM

## 2022-09-19 PROCEDURE — 90686 IIV4 VACC NO PRSV 0.5 ML IM: CPT | Performed by: STUDENT IN AN ORGANIZED HEALTH CARE EDUCATION/TRAINING PROGRAM

## 2022-09-19 PROCEDURE — 99214 OFFICE O/P EST MOD 30 MIN: CPT | Mod: 25,GC | Performed by: STUDENT IN AN ORGANIZED HEALTH CARE EDUCATION/TRAINING PROGRAM

## 2022-09-19 PROCEDURE — 90471 IMMUNIZATION ADMIN: CPT | Performed by: STUDENT IN AN ORGANIZED HEALTH CARE EDUCATION/TRAINING PROGRAM

## 2022-09-19 ASSESSMENT — FIBROSIS 4 INDEX: FIB4 SCORE: 0.38

## 2022-09-19 NOTE — PATIENT INSTRUCTIONS
Flu shot administered today.  Referral to ophthalmology provided.  Please complete uric acid laboratory test prior to next visit.

## 2022-09-19 NOTE — PROGRESS NOTES
Chief Complaint   Patient presents with    Follow-Up       HISTORY OF PRESENT ILLNESS: Sander Romero is a 27 y.o. male established patient who presents today for follow-up.  Here with mother (Maria Esther).  PMHx significant for Down syndrome, Graves' disease (previously following with endocrinology), T2DM, HTN, obesity.  Patient is somewhat nonverbal but responds to commands.    1. Need for vaccination  Requests influenza vaccination at clinic today.    2. Type 2 diabetes mellitus without complication, without long-term current use of insulin ([facility])  Currently since Dr. Kessler appreciate input.  Of endocrinology.  Last A1c 6.4%, currently on metformin 1000 mg twice daily.  Discussed referral to ophthalmology today for retinal screening.    3. Hyperthyroidism  Controlled at this time on methimazole 30 mg daily.  TSH 0.011 on June 20.  Due for labs in 3 months.  Appreciate endocrinology input.    4. Graves disease  This is the etiology of his hyperthyroidism.      5. Screening for metabolic disorder  We discussed uric acid screening today due to intermittent history of fever with left lower extremity joint pain.  This is sporadic and has only occurred twice in the last 6 months.      Past Medical History:   Diagnosis Date    CAP (community acquired pneumonia) 5/12/2017    Down syndrome     trisomy 21    Graves disease 1/16/2020    Hyperthyroidism 1/16/2020    Low HDL (under 40)     Obesity (BMI 35.0-39.9 without comorbidity) ([facility]) 5/23/2017    Sepsis(995.91) 5/12/2017    Sleep apnea     work up done as a child.  no tx    Type 2 diabetes mellitus without complication, without long-term current use of insulin ([facility]) 10/22/2019    Undescended testicle     Vitamin D deficiency        Patient Active Problem List    Diagnosis Date Noted    High risk medication use 07/01/2022    Obesity (BMI 30-39.9) 06/24/2022    Hyperthyroidism 01/16/2020    Graves disease 01/16/2020    Type 2 diabetes mellitus without complication,  "without long-term current use of insulin ([facility]) 10/22/2019    Obesity (BMI 35.0-39.9 without comorbidity) ([facility]) 05/23/2017    CAP (community acquired pneumonia) 05/12/2017    Low HDL (under 40)     Vitamin D deficiency     Sleep apnea     Undescended testicle     Down syndrome        Allergies:Patient has no known allergies.    Current Outpatient Medications   Medication Sig Dispense Refill    metFORMIN ER (GLUCOPHAGE XR) 500 MG TABLET SR 24 HR Take 2 tablets twice daily 360 Tablet 3    methimazole (TAPAZOLE) 10 MG Tab Take 3 Tablets by mouth every day. 270 Tablet 2    propranolol (INDERAL) 10 MG Tab Take 1 Tablet by mouth 3 times a day. Please make an appointment for any future refills 90 Tablet 0    therapeutic multivitamin-minerals (THERAGRAN-M) Tab Take 1 Tab by mouth every day. 30 Tab 0    Cholecalciferol (VITAMIN D PO) Take 3,000 Units by mouth every day.       No current facility-administered medications for this visit.       Social History     Tobacco Use    Smoking status: Never    Smokeless tobacco: Never   Vaping Use    Vaping Use: Never used   Substance Use Topics    Alcohol use: Yes     Alcohol/week: 0.0 oz     Comment: rarely    Drug use: No       Family History   Problem Relation Age of Onset    Hypertension Mother     Heart Disease Mother         congenital heart defect    Arthritis Mother     Diabetes Father     Arthritis Maternal Grandmother     Stroke Maternal Grandfather     Diabetes Maternal Grandfather     Diabetes Paternal Grandmother     Cancer Paternal Grandfather         Prostate    Diabetes Paternal Grandfather          Review of Systems   ROS  As above in HPI.  All other systems reviewed and negative.    Exam:  /70 (BP Location: Left arm, Patient Position: Sitting, BP Cuff Size: Adult)   Pulse 78   Temp 36.4 °C (97.6 °F) (Temporal)   Ht 1.651 m (5' 5\")   Wt 96.9 kg (213 lb 9.6 oz)   SpO2 94%  Body mass index is 35.54 kg/m².    Constitutional:  NAD, well appearing.  HEENT:   " NC/AT  Cardiovascular: RRR.   No m/r/g. No carotid bruits.       Lungs:   CTAB, no w/r/r, no respiratory distress.  Abdomen: Soft, NT/ND + BS, no masses, no suprapubic tenderness, no hepatomegaly.  Extremities:  2+ DP and radial pulses bilaterally.  No c/c/e.  Skin:  Warm and dry.    Neurologic: Alert & oriented x 3, CN II-XII grossly intact, strength and sensation grossly intact.  No focal deficits noted.  Psychiatric:  Affect normal, mood normal, judgment normal.    Assessment/Plan:     1. Need for vaccination  Influenza vaccine administered.  - INFLUENZA VACCINE QUAD INJ (PF)    2. Type 2 diabetes mellitus without complication, without long-term current use of insulin ([facility])  Metformin 1000 twice daily.  Continue.  - Referral to Ophthalmology    3. Hyperthyroidism  4. Graves disease  Continue methimazole 30 mg daily per endocrinology.  Appreciate input.    Check TSH in 3 months.    5. Screening for metabolic disorder  Will screen uric acid given history of fever with joint pain that has occurred twice in previous 6 months.  Higher association of gout with Down syndrome.  - URIC ACID; Future    TSH at next visit if due.  Review laboratory results.  Review ophthalmology referral.    All imaging results and lab results and consult notes are reviewed at this visit.  Followup: Return in about 6 months (around 3/19/2023).

## 2022-10-27 LAB
25(OH)D3+25(OH)D2 SERPL-MCNC: 29.4 NG/ML (ref 30–100)
ALBUMIN SERPL-MCNC: 4.6 G/DL (ref 4.1–5.2)
ALBUMIN/GLOB SERPL: 1.4 {RATIO} (ref 1.2–2.2)
ALP SERPL-CCNC: 120 IU/L (ref 44–121)
ALT SERPL-CCNC: 89 IU/L (ref 0–44)
AST SERPL-CCNC: 40 IU/L (ref 0–40)
BILIRUB SERPL-MCNC: 0.4 MG/DL (ref 0–1.2)
BUN SERPL-MCNC: 13 MG/DL (ref 6–20)
BUN/CREAT SERPL: 16 (ref 9–20)
CALCIUM SERPL-MCNC: 9.6 MG/DL (ref 8.7–10.2)
CHLORIDE SERPL-SCNC: 100 MMOL/L (ref 96–106)
CO2 SERPL-SCNC: 20 MMOL/L (ref 20–29)
CREAT SERPL-MCNC: 0.8 MG/DL (ref 0.76–1.27)
EGFRCR SERPLBLD CKD-EPI 2021: 124 ML/MIN/1.73
GLOBULIN SER CALC-MCNC: 3.3 G/DL (ref 1.5–4.5)
GLUCOSE SERPL-MCNC: 135 MG/DL (ref 70–99)
POTASSIUM SERPL-SCNC: 4.4 MMOL/L (ref 3.5–5.2)
PROT SERPL-MCNC: 7.9 G/DL (ref 6–8.5)
SODIUM SERPL-SCNC: 135 MMOL/L (ref 134–144)
T3FREE SERPL-MCNC: 2.9 PG/ML (ref 2–4.4)
T4 FREE SERPL-MCNC: 1.08 NG/DL (ref 0.82–1.77)
TSH SERPL DL<=0.005 MIU/L-ACNC: 3.1 UIU/ML (ref 0.45–4.5)

## 2022-11-01 ENCOUNTER — OFFICE VISIT (OUTPATIENT)
Dept: ENDOCRINOLOGY | Facility: MEDICAL CENTER | Age: 27
End: 2022-11-01
Attending: INTERNAL MEDICINE
Payer: MEDICAID

## 2022-11-01 VITALS
SYSTOLIC BLOOD PRESSURE: 92 MMHG | HEIGHT: 65 IN | DIASTOLIC BLOOD PRESSURE: 64 MMHG | HEART RATE: 91 BPM | OXYGEN SATURATION: 98 % | WEIGHT: 212.8 LBS | BODY MASS INDEX: 35.45 KG/M2

## 2022-11-01 DIAGNOSIS — E05.90 HYPERTHYROIDISM: ICD-10-CM

## 2022-11-01 DIAGNOSIS — E11.9 TYPE 2 DIABETES MELLITUS WITHOUT COMPLICATION, WITHOUT LONG-TERM CURRENT USE OF INSULIN (HCC): ICD-10-CM

## 2022-11-01 DIAGNOSIS — E05.00 GRAVES DISEASE: ICD-10-CM

## 2022-11-01 DIAGNOSIS — Z79.899 HIGH RISK MEDICATION USE: ICD-10-CM

## 2022-11-01 DIAGNOSIS — E55.9 VITAMIN D DEFICIENCY: ICD-10-CM

## 2022-11-01 LAB
HBA1C MFR BLD: 6 % (ref 0–5.6)
INT CON NEG: ABNORMAL
INT CON POS: ABNORMAL

## 2022-11-01 PROCEDURE — 83036 HEMOGLOBIN GLYCOSYLATED A1C: CPT | Performed by: INTERNAL MEDICINE

## 2022-11-01 PROCEDURE — 99211 OFF/OP EST MAY X REQ PHY/QHP: CPT | Performed by: INTERNAL MEDICINE

## 2022-11-01 PROCEDURE — 99214 OFFICE O/P EST MOD 30 MIN: CPT | Performed by: INTERNAL MEDICINE

## 2022-11-01 RX ORDER — METHIMAZOLE 10 MG/1
20 TABLET ORAL DAILY
Qty: 180 TABLET | Refills: 2 | Status: SHIPPED | OUTPATIENT
Start: 2022-11-01 | End: 2023-05-02 | Stop reason: SDUPTHER

## 2022-11-01 ASSESSMENT — FIBROSIS 4 INDEX: FIB4 SCORE: 0.41

## 2022-11-01 NOTE — PROGRESS NOTES
Chief Complaint: Follow up for Hyperthyroidism secondary to Grave's disease and type 2 diabetes and history of Down syndrome.     HPI:     Sander Romero is a 27 y.o. male here for follow up of of the above medical issues.   She was previously a patient of KATHERINE Brush.    He has Hyperthyroidism first diagnosed in April 2019 with a positive thyroid uptake and scan from May 2019 showing increased 5-hour uptake of 65% c/w Graves' disease.   Previously his hyperthyroidism was difficult to control due to noncompliance with methimazole therapy.  Patient has Down syndrome and he does not like taking medications.  His mother also originally did not understand why the patient had to take methimazole.      Currently he is taking methimazole 30 mg daily  He reports better compliance  Her TSH is now normal at 3.1 and free T4 is 1.0 and free T3 is 2.9 on 10/26/2022          Patient also has type 2 diabetes initially discovered in Aug 2018.    His A1c was 6.0% on 10/31/2022    He is on Metformin  mg 2 pills  twice a day.      He denies Se with his metformin       His LDL cholesterol was 93 with triglycerides 164 in June 20, 2022    He does not have diabetic kidney disease  U ACR was less than 30 on June 20, 2022    Eye exam was on 7/13/2022          Patient's medications, allergies, and social histories were reviewed and updated as appropriate.      ROS:     CONS:     No fever, no chills   EYES:     No diplopia, no blurry vision   CV:           No chest pain, no palpitations   PULM:     No SOB, no cough, no hemoptysis.   GI:            No nausea, no vomiting, no diarrhea, no constipation   ENDO:     No polyuria, no polydipsia, no heat intolerance, no cold intolerance       Past Medical History:  Problem List:  2022-07: High risk medication use  2022-06: Obesity (BMI 30-39.9)  2020-01: Hyperthyroidism  2020-01: Graves disease  2019-10: Type 2 diabetes mellitus without complication, without long-  term  "current use of insulin ([facility])  2017-05: Obesity (BMI 35.0-39.9 without comorbidity) ([facility])  2017-05: Sepsis(995.91)  2017-05: CAP (community acquired pneumonia)  Down syndrome  Low HDL (under 40)  Vitamin D deficiency  Impaired fasting glucose  Sleep apnea  Undescended testicle    Past Surgical History:  Past Surgical History:   Procedure Laterality Date    MYRINGOTOMY      OTHER      surgery for undescended testies.    TESTICLE EXPLORATION      undescended testicle    TONSILLECTOMY      TONSILLECTOMY AND ADENOIDECTOMY          Allergies:  Patient has no known allergies.     Social History:  Social History     Tobacco Use    Smoking status: Never    Smokeless tobacco: Never   Vaping Use    Vaping Use: Never used   Substance Use Topics    Alcohol use: Yes     Alcohol/week: 0.0 oz     Comment: rarely    Drug use: No        Family History:   family history includes Arthritis in his maternal grandmother and mother; Cancer in his paternal grandfather; Diabetes in his father, maternal grandfather, paternal grandfather, and paternal grandmother; Heart Disease in his mother; Hypertension in his mother; Stroke in his maternal grandfather.      PHYSICAL EXAM:   Vital signs: BP (!) 92/64 (BP Location: Left arm, Patient Position: Sitting, BP Cuff Size: Large adult)   Pulse 91   Ht 1.651 m (5' 5\")   Wt 96.5 kg (212 lb 12.8 oz)   SpO2 98%   BMI 35.41 kg/m²   GENERAL: Obese young gentleman with Down syndrome in no apparent distress.   EYE:  No ocular asymmetry, PERRLA, No exophthalmos or lid lag  HENT: Pink, moist mucous membranes.    NECK: No thyromegaly.   CARDIOVASCULAR:  No murmurs  LUNGS: Clear breath sounds  ABDOMEN: Soft, nontender   EXTREMITIES: No clubbing, cyanosis, or edema.   NEUROLOGICAL: No gross focal motor abnormalities, there is visible tremors with both hands  LYMPH: No cervical adenopathy palpated.   SKIN: No rashes, lesions.       Labs:  Lab Results   Component Value Date/Time    WBC 6.2 06/20/2022 06:57 AM "    WBC 7.0 02/11/2021 09:31 AM    RBC 4.86 06/20/2022 06:57 AM    RBC 4.70 02/11/2021 09:31 AM    HEMOGLOBIN 16.0 06/20/2022 06:57 AM    HEMOGLOBIN 15.2 02/11/2021 09:31 AM    MCV 97 06/20/2022 06:57 AM    MCV 95.3 02/11/2021 09:31 AM    MCH 32.9 06/20/2022 06:57 AM    MCH 32.3 02/11/2021 09:31 AM    MCHC 34.0 06/20/2022 06:57 AM    MCHC 33.9 02/11/2021 09:31 AM    RDW 13.3 06/20/2022 06:57 AM    RDW 43.4 02/11/2021 09:31 AM    MPV 9.0 02/11/2021 09:31 AM       Lab Results   Component Value Date/Time    SODIUM 135 10/26/2022 12:06 PM    SODIUM 137 06/29/2021 01:33 PM    POTASSIUM 4.4 10/26/2022 12:06 PM    POTASSIUM 4.1 06/29/2021 01:33 PM    CHLORIDE 100 10/26/2022 12:06 PM    CHLORIDE 101 06/29/2021 01:33 PM    CO2 20 10/26/2022 12:06 PM    CO2 24 06/29/2021 01:33 PM    ANION 12.0 06/29/2021 01:33 PM    GLUCOSE 135 (H) 10/26/2022 12:06 PM    GLUCOSE 145 (H) 06/29/2021 01:33 PM    BUN 13 10/26/2022 12:06 PM    BUN 8 06/29/2021 01:33 PM    CREATININE 0.80 10/26/2022 12:06 PM    CREATININE 0.76 06/29/2021 01:33 PM    CALCIUM 9.6 10/26/2022 12:06 PM    CALCIUM 8.7 06/29/2021 01:33 PM    ASTSGOT 40 10/26/2022 12:06 PM    ASTSGOT 27 06/29/2021 01:33 PM    ALTSGPT 89 (H) 10/26/2022 12:06 PM    ALTSGPT 41 06/29/2021 01:33 PM    TBILIRUBIN 0.4 10/26/2022 12:06 PM    TBILIRUBIN 0.5 06/29/2021 01:33 PM    ALBUMIN 4.6 10/26/2022 12:06 PM    ALBUMIN 3.52 (L) 06/29/2021 01:33 PM    ALBUMIN 3.7 06/29/2021 01:33 PM    TOTPROTEIN 7.9 10/26/2022 12:06 PM    TOTPROTEIN 7.3 06/29/2021 01:33 PM    TOTPROTEIN 7.6 06/29/2021 01:33 PM    GLOBULIN 3.3 10/26/2022 12:06 PM    GLOBULIN 3.9 (H) 06/29/2021 01:33 PM    AGRATIO 1.4 10/26/2022 12:06 PM    AGRATIO 0.9 06/29/2021 01:33 PM       Lab Results   Component Value Date/Time    TSHULTRASEN <0.005 (L) 01/13/2020 0850     Lab Results   Component Value Date/Time    FREET4 3.08 (H) 01/13/2020 0850     Lab Results   Component Value Date/Time    FREET3 20.30 (H) 01/13/2020 0850     No results  found for: THYSTIMIG      Imaging: Please refer to thyroid uptake and scan from May 2019      ASSESSMENT/PLAN:     1. Hyperthyroidism  Controlled  Adjust methimazole to 20 mg daily  Follow-up in 4 months with repeat of TSH free T4 and free T3 level    2. Graves disease  This is the etiology of his hyperthyroidism    3. Type 2 diabetes mellitus without complication, without long-term current use of insulin (HCC)  Fairly controlled  A1c is better at 6.0%  Continue  metformin to 1000 mg twice a day  He is up-to-date with his labs  Eye exam was done today  Follow-up in 4 months with repeat of A1c    4. High risk medication use  Patient is taking methimazole which is a high risk medication      Return in about 4 months (around 3/1/2023).      Thank you kindly for allowing me to participate in the thyroid care plan for this patient.    David Kessler MD, FACE, NU  01/16/20    CC:   JOSE MIGUEL Jose

## 2022-11-19 DIAGNOSIS — E05.90 HYPERTHYROIDISM: ICD-10-CM

## 2022-12-05 ENCOUNTER — OFFICE VISIT (OUTPATIENT)
Dept: URGENT CARE | Facility: CLINIC | Age: 27
End: 2022-12-05
Payer: MEDICAID

## 2022-12-05 ENCOUNTER — APPOINTMENT (OUTPATIENT)
Dept: RADIOLOGY | Facility: IMAGING CENTER | Age: 27
End: 2022-12-05
Attending: FAMILY MEDICINE
Payer: MEDICAID

## 2022-12-05 VITALS
BODY MASS INDEX: 35.32 KG/M2 | SYSTOLIC BLOOD PRESSURE: 110 MMHG | HEART RATE: 102 BPM | DIASTOLIC BLOOD PRESSURE: 76 MMHG | TEMPERATURE: 97.8 F | RESPIRATION RATE: 14 BRPM | WEIGHT: 212 LBS | HEIGHT: 65 IN | OXYGEN SATURATION: 96 %

## 2022-12-05 DIAGNOSIS — M79.671 RIGHT FOOT PAIN: ICD-10-CM

## 2022-12-05 DIAGNOSIS — M10.9 ACUTE GOUT OF RIGHT FOOT, UNSPECIFIED CAUSE: ICD-10-CM

## 2022-12-05 DIAGNOSIS — R00.0 TACHYCARDIA: ICD-10-CM

## 2022-12-05 PROCEDURE — 73610 X-RAY EXAM OF ANKLE: CPT | Mod: TC,RT | Performed by: NURSE PRACTITIONER

## 2022-12-05 PROCEDURE — 99214 OFFICE O/P EST MOD 30 MIN: CPT | Performed by: FAMILY MEDICINE

## 2022-12-05 RX ORDER — PREDNISONE 20 MG/1
40 TABLET ORAL DAILY
Qty: 10 TABLET | Refills: 0 | Status: SHIPPED | OUTPATIENT
Start: 2022-12-05 | End: 2022-12-10

## 2022-12-05 RX ORDER — PROPRANOLOL HYDROCHLORIDE 10 MG/1
TABLET ORAL
Qty: 90 TABLET | Refills: 0 | Status: SHIPPED | OUTPATIENT
Start: 2022-12-05 | End: 2023-02-22

## 2022-12-05 ASSESSMENT — FIBROSIS 4 INDEX: FIB4 SCORE: 0.41

## 2022-12-05 NOTE — PROGRESS NOTES
"  Subjective:      27 y.o. male presents to urgent care with his mom for right foot pain that started on Friday.  No inciting event or trauma at that time.  The pain is constant, but seems to be worse with weightbearing.  Patient is unable to describe or quantify his pain.  He has been using ibuprofen with some relief in symptoms.  He is also using the help of crutches to get around due to the pain.    Heart rate is elevated today in urgent care. He denies any chest pain, palpitations, or shortness of breath.     He denies any other questions or concerns at this time.    Current problem list, medication, and past medical/surgical history were reviewed in Epic.    ROS  See HPI     Objective:      /76   Pulse (!) 102   Temp 36.6 °C (97.8 °F) (Temporal)   Resp 14   Ht 1.651 m (5' 5\")   Wt 96.2 kg (212 lb)   SpO2 96%   BMI 35.28 kg/m²     Physical Exam  Constitutional:       General: He is not in acute distress.     Appearance: He is not diaphoretic.   Cardiovascular:      Rate and Rhythm: Regular rhythm. Tachycardia present.      Pulses:           Dorsalis pedis pulses are 2+ on the right side.        Posterior tibial pulses are 2+ on the right side.      Heart sounds: Normal heart sounds.   Pulmonary:      Effort: Pulmonary effort is normal. No respiratory distress.      Breath sounds: Normal breath sounds.   Musculoskeletal:      Comments: No discolorations or deformities noted to inspection of right foot/ankle.  He is tender to palpation of lateral malleoli and midfoot zone.  Not tender to palpation of medial malleoli.  He is able to plantar and dorsiflex against resistance.  Antalgic gait.   Neurological:      Mental Status: He is alert.   Psychiatric:         Mood and Affect: Affect normal.         Judgment: Judgment normal.     Assessment/Plan:     1. Acute gout of right foot, unspecified cause  2. Right foot pain  3. Tachycardia  XRAY showing no acute fractures or dislocations.  Most consistent with " gout.  Prescription for prednisone has been sent.  Systemic symptoms seen through tachycardia.  - DX-ANKLE 3+ VIEWS RIGHT; Future  - predniSONE (DELTASONE) 20 MG Tab; Take 2 Tablets by mouth every day for 5 days.  Dispense: 10 Tablet; Refill: 0      Instructed to return to Urgent Care or nearest Emergency Department if symptoms fail to improve, for any change in condition, further concerns, or new concerning symptoms. Patient states understanding of the plan of care and discharge instructions.    Corin Tsai M.D.

## 2023-02-19 DIAGNOSIS — E05.90 HYPERTHYROIDISM: ICD-10-CM

## 2023-02-21 NOTE — TELEPHONE ENCOUNTER
Propranolol Refill     Received request via: Pharmacy    Was the patient seen in the last year in this department? Yes    Does the patient have an active prescription (recently filled or refills available) for medication(s) requested? No    Does the patient have group home Plus and need 100 day supply (blood pressure, diabetes and cholesterol meds only)? Patient does not have SCP

## 2023-02-22 RX ORDER — PROPRANOLOL HYDROCHLORIDE 10 MG/1
TABLET ORAL
Qty: 90 TABLET | Refills: 0 | Status: SHIPPED | OUTPATIENT
Start: 2023-02-22 | End: 2023-02-28 | Stop reason: SDUPTHER

## 2023-02-28 ENCOUNTER — TELEPHONE (OUTPATIENT)
Dept: ENDOCRINOLOGY | Facility: MEDICAL CENTER | Age: 28
End: 2023-02-28

## 2023-02-28 DIAGNOSIS — E05.90 HYPERTHYROIDISM: ICD-10-CM

## 2023-02-28 RX ORDER — PROPRANOLOL HYDROCHLORIDE 10 MG/1
TABLET ORAL
Qty: 90 TABLET | Refills: 3 | Status: SHIPPED | OUTPATIENT
Start: 2023-02-28 | End: 2024-02-21

## 2023-04-12 ENCOUNTER — APPOINTMENT (OUTPATIENT)
Dept: URGENT CARE | Facility: PHYSICIAN GROUP | Age: 28
End: 2023-04-12
Payer: MEDICAID

## 2023-04-12 ENCOUNTER — OFFICE VISIT (OUTPATIENT)
Dept: URGENT CARE | Facility: CLINIC | Age: 28
End: 2023-04-12
Payer: MEDICAID

## 2023-04-12 VITALS
OXYGEN SATURATION: 93 % | WEIGHT: 207 LBS | BODY MASS INDEX: 34.49 KG/M2 | RESPIRATION RATE: 16 BRPM | HEIGHT: 65 IN | SYSTOLIC BLOOD PRESSURE: 98 MMHG | HEART RATE: 82 BPM | TEMPERATURE: 98.4 F | DIASTOLIC BLOOD PRESSURE: 62 MMHG

## 2023-04-12 DIAGNOSIS — J22 LRTI (LOWER RESPIRATORY TRACT INFECTION): ICD-10-CM

## 2023-04-12 DIAGNOSIS — R05.1 ACUTE COUGH: ICD-10-CM

## 2023-04-12 PROCEDURE — 94640 AIRWAY INHALATION TREATMENT: CPT | Performed by: NURSE PRACTITIONER

## 2023-04-12 PROCEDURE — 99214 OFFICE O/P EST MOD 30 MIN: CPT | Mod: 25 | Performed by: NURSE PRACTITIONER

## 2023-04-12 RX ORDER — ALBUTEROL SULFATE 90 UG/1
2 AEROSOL, METERED RESPIRATORY (INHALATION) EVERY 6 HOURS PRN
Qty: 8.5 G | Refills: 0 | Status: SHIPPED | OUTPATIENT
Start: 2023-04-12 | End: 2024-02-21

## 2023-04-12 RX ORDER — PREDNISONE 10 MG/1
40 TABLET ORAL DAILY
Qty: 20 TABLET | Refills: 0 | Status: SHIPPED | OUTPATIENT
Start: 2023-04-12 | End: 2023-04-17

## 2023-04-12 RX ORDER — DOXYCYCLINE HYCLATE 100 MG
100 TABLET ORAL 2 TIMES DAILY
Qty: 14 TABLET | Refills: 0 | Status: SHIPPED | OUTPATIENT
Start: 2023-04-12 | End: 2023-04-19

## 2023-04-12 ASSESSMENT — FIBROSIS 4 INDEX: FIB4 SCORE: 0.41

## 2023-04-13 RX ORDER — IPRATROPIUM BROMIDE AND ALBUTEROL SULFATE 2.5; .5 MG/3ML; MG/3ML
3 SOLUTION RESPIRATORY (INHALATION) ONCE
Status: COMPLETED | OUTPATIENT
Start: 2023-04-13 | End: 2023-04-13

## 2023-04-13 RX ADMIN — IPRATROPIUM BROMIDE AND ALBUTEROL SULFATE 3 ML: 2.5; .5 SOLUTION RESPIRATORY (INHALATION) at 15:25

## 2023-04-13 ASSESSMENT — ENCOUNTER SYMPTOMS
DIARRHEA: 1
SHORTNESS OF BREATH: 1
MYALGIAS: 0
SPUTUM PRODUCTION: 0
VOMITING: 0
COUGH: 1
WHEEZING: 0
CHILLS: 1
FEVER: 1
DIZZINESS: 0
NAUSEA: 0
SORE THROAT: 0
EYE PAIN: 0

## 2023-04-14 NOTE — PROGRESS NOTES
Subjective:   Sander Romero is a 27 y.o. male who presents for Nasal Congestion (X 3 days with cough, fever (101), chills, diarrhea, vomiting.  Mom said at night his O2 is dropping to 87%.   Mom put him on her Oxygen.  Negative Home Covid test yesterday. )      URI   This is a new problem. Episode onset: 3 days; patient has Down syndrome mother providing HPI for today's visit.  Developed a fever oxygen was low yesterday 87% she does have a home concentrator which he used having improvement with oxygenation. The problem has been unchanged. The maximum temperature recorded prior to his arrival was 101 - 101.9 F. The fever has been present for 1 to 2 days. Associated symptoms include congestion, coughing and diarrhea. Pertinent negatives include no chest pain, nausea, rash, sore throat, vomiting or wheezing. He has tried acetaminophen (Home oxygen) for the symptoms. The treatment provided mild relief.     Review of Systems   Constitutional:  Positive for chills, fever and malaise/fatigue.   HENT:  Positive for congestion. Negative for sore throat.    Eyes:  Negative for pain.   Respiratory:  Positive for cough and shortness of breath. Negative for sputum production and wheezing.    Cardiovascular:  Negative for chest pain.   Gastrointestinal:  Positive for diarrhea. Negative for nausea and vomiting.   Genitourinary:  Negative for hematuria.   Musculoskeletal:  Negative for myalgias.   Skin:  Negative for rash.   Neurological:  Negative for dizziness.     Medications:    albuterol Aers  doxycycline Tabs  metFORMIN ER Tb24  methimazole Tabs  predniSONE Tabs  propranolol Tabs  therapeutic multivitamin-minerals Tabs  VITAMIN D PO    Allergies: Patient has no known allergies.    Problem List: Sander Romero does not have any pertinent problems on file.    Surgical History:  Past Surgical History:   Procedure Laterality Date    MYRINGOTOMY      OTHER      surgery for undescended testies.    TESTICLE  "EXPLORATION      undescended testicle    TONSILLECTOMY      TONSILLECTOMY AND ADENOIDECTOMY         Past Social Hx: Sander Romero  reports that he has never smoked. He has never used smokeless tobacco. He reports current alcohol use. He reports that he does not use drugs.     Past Family Hx:  Sander Romero family history includes Arthritis in his maternal grandmother and mother; Cancer in his paternal grandfather; Diabetes in his father, maternal grandfather, paternal grandfather, and paternal grandmother; Heart Disease in his mother; Hypertension in his mother; Stroke in his maternal grandfather.     Problem list, medications, and allergies reviewed by myself today in Epic.     Objective:     BP 98/62   Pulse 82   Temp 36.9 °C (98.4 °F) (Temporal)   Resp 16   Ht 1.651 m (5' 5\")   Wt 93.9 kg (207 lb)   SpO2 93%   BMI 34.45 kg/m²     Physical Exam  Vitals and nursing note reviewed.   Constitutional:       General: He is not in acute distress.     Appearance: He is well-developed.   HENT:      Head: Normocephalic and atraumatic.      Right Ear: External ear normal.      Left Ear: External ear normal.      Nose: Nose normal.      Mouth/Throat:      Mouth: Mucous membranes are moist.   Eyes:      Conjunctiva/sclera: Conjunctivae normal.   Cardiovascular:      Rate and Rhythm: Normal rate.   Pulmonary:      Effort: Pulmonary effort is normal. No respiratory distress.      Breath sounds: Normal breath sounds.   Abdominal:      General: There is no distension.   Musculoskeletal:         General: Normal range of motion.   Skin:     General: Skin is warm and dry.   Neurological:      General: No focal deficit present.      Mental Status: He is alert and oriented to person, place, and time. Mental status is at baseline.      Gait: Gait (gait at baseline) normal.   Psychiatric:         Judgment: Judgment normal.       Assessment/Plan:     Diagnosis and associated orders:     1. LRTI (lower " respiratory tract infection)  doxycycline (VIBRAMYCIN) 100 MG Tab    albuterol 108 (90 Base) MCG/ACT Aero Soln inhalation aerosol    DX-CHEST-2 VIEWS    predniSONE (DELTASONE) 10 MG Tab    ipratropium-albuterol (DUONEB) nebulizer solution      2. Acute cough           Comments/MDM:     I personally reviewed prior external notes and prior test results pertinent to today's visit.  Patient has a history of Down syndrome, Lung sounds improved with breathing treatment. PO2 reassessed and adequate.   Unfortunately imaging unavailable at clinical location order provided.  He will be treated with steroids and antibiotics.  Continue wearing home O2.  Discussed management options, risks and benefits, and alternatives to treatment plan agreed upon.   Red flags discussed and indications to immediately call 911 or present to the Emergency Department.   Supportive care, differential diagnoses, and indications for immediate follow-up discussed with patient.    Patient expresses understanding and agrees to plan. Patient denies any other questions or concerns.                Please note that this dictation was created using voice recognition software. I have made a reasonable attempt to correct obvious errors, but I expect that there are errors of grammar and possibly content that I did not discover before finalizing the note.    This note was electronically signed by Jovani MURRIETA.

## 2023-04-26 LAB
25(OH)D3+25(OH)D2 SERPL-MCNC: 22.1 NG/ML (ref 30–100)
ALBUMIN SERPL-MCNC: 4.1 G/DL (ref 4.1–5.2)
ALBUMIN/CREAT UR: 4 MG/G CREAT (ref 0–29)
ALBUMIN/GLOB SERPL: 1.1 {RATIO} (ref 1.2–2.2)
ALP SERPL-CCNC: 120 IU/L (ref 44–121)
ALT SERPL-CCNC: 138 IU/L (ref 0–44)
AST SERPL-CCNC: 61 IU/L (ref 0–40)
BILIRUB SERPL-MCNC: 0.5 MG/DL (ref 0–1.2)
BUN SERPL-MCNC: 10 MG/DL (ref 6–20)
BUN/CREAT SERPL: 14 (ref 9–20)
CALCIUM SERPL-MCNC: 9 MG/DL (ref 8.7–10.2)
CHLORIDE SERPL-SCNC: 100 MMOL/L (ref 96–106)
CHOLEST SERPL-MCNC: 181 MG/DL (ref 100–199)
CO2 SERPL-SCNC: 21 MMOL/L (ref 20–29)
CREAT SERPL-MCNC: 0.72 MG/DL (ref 0.76–1.27)
CREAT UR-MCNC: 115.3 MG/DL
EGFRCR SERPLBLD CKD-EPI 2021: 128 ML/MIN/1.73
GLOBULIN SER CALC-MCNC: 3.9 G/DL (ref 1.5–4.5)
GLUCOSE SERPL-MCNC: 137 MG/DL (ref 70–99)
HDLC SERPL-MCNC: 36 MG/DL
LABORATORY COMMENT REPORT: ABNORMAL
LDLC SERPL CALC-MCNC: 101 MG/DL (ref 0–99)
MICROALBUMIN UR-MCNC: 4.6 UG/ML
POTASSIUM SERPL-SCNC: 4.6 MMOL/L (ref 3.5–5.2)
PROT SERPL-MCNC: 8 G/DL (ref 6–8.5)
SODIUM SERPL-SCNC: 136 MMOL/L (ref 134–144)
T3FREE SERPL-MCNC: 3.5 PG/ML (ref 2–4.4)
T4 FREE SERPL-MCNC: 1.23 NG/DL (ref 0.82–1.77)
TRIGL SERPL-MCNC: 259 MG/DL (ref 0–149)
TSH SERPL DL<=0.005 MIU/L-ACNC: 2.46 UIU/ML (ref 0.45–4.5)
VLDLC SERPL CALC-MCNC: 44 MG/DL (ref 5–40)

## 2023-05-02 ENCOUNTER — NON-PROVIDER VISIT (OUTPATIENT)
Dept: ENDOCRINOLOGY | Facility: MEDICAL CENTER | Age: 28
End: 2023-05-02
Attending: INTERNAL MEDICINE
Payer: MEDICAID

## 2023-05-02 VITALS
WEIGHT: 212 LBS | BODY MASS INDEX: 35.32 KG/M2 | HEART RATE: 81 BPM | SYSTOLIC BLOOD PRESSURE: 122 MMHG | DIASTOLIC BLOOD PRESSURE: 64 MMHG | HEIGHT: 65 IN | OXYGEN SATURATION: 95 %

## 2023-05-02 DIAGNOSIS — E11.9 TYPE 2 DIABETES MELLITUS WITHOUT COMPLICATION, WITHOUT LONG-TERM CURRENT USE OF INSULIN (HCC): ICD-10-CM

## 2023-05-02 DIAGNOSIS — E05.90 HYPERTHYROIDISM: ICD-10-CM

## 2023-05-02 DIAGNOSIS — E55.9 VITAMIN D INSUFFICIENCY: ICD-10-CM

## 2023-05-02 DIAGNOSIS — E78.5 DYSLIPIDEMIA: ICD-10-CM

## 2023-05-02 LAB
HBA1C MFR BLD: 6 % (ref ?–5.8)
POCT INT CON NEG: NEGATIVE
POCT INT CON POS: POSITIVE

## 2023-05-02 PROCEDURE — 99212 OFFICE O/P EST SF 10 MIN: CPT | Performed by: INTERNAL MEDICINE

## 2023-05-02 PROCEDURE — 83036 HEMOGLOBIN GLYCOSYLATED A1C: CPT

## 2023-05-02 RX ORDER — LANCETS 30 GAUGE
EACH MISCELLANEOUS
Qty: 100 EACH | Refills: 2 | Status: SHIPPED | OUTPATIENT
Start: 2023-05-02

## 2023-05-02 RX ORDER — IPRATROPIUM BROMIDE AND ALBUTEROL SULFATE 2.5; .5 MG/3ML; MG/3ML
SOLUTION RESPIRATORY (INHALATION)
COMMUNITY
Start: 2023-04-12 | End: 2024-02-21

## 2023-05-02 RX ORDER — METHIMAZOLE 10 MG/1
20 TABLET ORAL DAILY
Qty: 180 TABLET | Refills: 2 | Status: SHIPPED | OUTPATIENT
Start: 2023-05-02 | End: 2023-10-19 | Stop reason: SDUPTHER

## 2023-05-02 RX ORDER — METFORMIN HYDROCHLORIDE 500 MG/1
TABLET, EXTENDED RELEASE ORAL
Qty: 360 TABLET | Refills: 3 | Status: SHIPPED | OUTPATIENT
Start: 2023-05-02

## 2023-05-02 ASSESSMENT — FIBROSIS 4 INDEX: FIB4 SCORE: 0.5

## 2023-05-02 NOTE — PROGRESS NOTES
"RN-CDE Note    Subjective:   Endocrinology Clinic Progress Note  PCP: Franklyn Malave M.D.    HPI:  Sander Romero is a 27 y.o. old patient who is seen today by the Diabetes Nurse Specialist for review of his controlled type 2 diabetes.    Sander is also being treated for hypthyroid with Methimazole 10 mg bid   Latest Reference Range & Units 04/25/23 09:52   TSH 0.450 - 4.500 uIU/mL 2.460   Free T-4 0.82 - 1.77 ng/dL 1.23   T3,Free 2.0 - 4.4 pg/mL 3.5     Vitamin D levels are low, currently on Vitamin D 3000 units per day   Latest Reference Range & Units 04/25/23 09:52   25-Hydroxy   Vitamin D 25 30.0 - 100.0 ng/mL 22.1 (L)   (L): Data is abnormally low  Recent changes in health: recent flu  DM:   Last A1c:   Lab Results   Component Value Date/Time    HBA1C 6.0 (A) 05/02/2023 01:47 PM      Previous A1c was 6 on 11/1/22  A1C GOAL: < 7    Diabetes Medications:   Metformin 1000 mg bid.       Exercise: sporadic irregular exercise, <half hour walking weekly  Diet: \"healthy\" diet  in general  Patient's body mass index is 35.28 kg/m². Exercise and nutrition counseling were performed at this visit.    Glucose monitoring frequency: not testing     Hypoglycemic episodes: no  Last Retinal Exam: on file and up-to-date    Foot Exam:  Monofilament:  current  Lab Results   Component Value Date/Time    MALBCRT see below 02/11/2021 09:31 AM    MICROALBUR <1.2 02/11/2021 09:31 AM    MICRALB 4.6 04/25/2023 09:52 AM        ACR Albumin/Creatinine Ratio goal <30     HTN:   Blood pressure goal <130/<80 .   Currently Rx ACE/ARB: Not Indicated     Dyslipidemia:    Lab Results   Component Value Date/Time    CHOLSTRLTOT 181 04/25/2023 09:52 AM    CHOLSTRLTOT 123 02/11/2021 09:31 AM    LDL 63 02/11/2021 09:31 AM    HDL 36 (L) 04/25/2023 09:52 AM    HDL 34 (A) 02/11/2021 09:31 AM    TRIGLYCERIDE 259 (H) 04/25/2023 09:52 AM    TRIGLYCERIDE 132 02/11/2021 09:31 AM         Currently Rx Statin: No     He  reports that he has never smoked. " He has never used smokeless tobacco.      Plan:     Discussed and educated on:   - All medications, side effects and compliance (discussed carefully)  - HbA1C: target  - Home glucose monitoring emphasized  - Weight control and daily exercise    Recommended medication changes: no changes to diabetes medication.   Increase vitamin D to 4000 iu per day

## 2023-06-02 DIAGNOSIS — E05.90 HYPERTHYROIDISM: ICD-10-CM

## 2023-06-03 NOTE — TELEPHONE ENCOUNTER
Received request via: Pharmacy    Was the patient seen in the last year in this department? Yes    Does the patient have an active prescription (recently filled or refills available) for medication(s) requested? No    Does the patient have [facility] Plus and need 100 day supply (blood pressure, diabetes and cholesterol meds only)? Patient does not have SCP      Insurance requires 90 day supply

## 2023-06-08 RX ORDER — PROPRANOLOL HYDROCHLORIDE 10 MG/1
TABLET ORAL
Qty: 270 TABLET | Refills: 1 | OUTPATIENT
Start: 2023-06-08

## 2023-09-12 ENCOUNTER — HOSPITAL ENCOUNTER (OUTPATIENT)
Dept: LAB | Facility: MEDICAL CENTER | Age: 28
End: 2023-09-12
Payer: MEDICAID

## 2023-09-12 ENCOUNTER — APPOINTMENT (OUTPATIENT)
Dept: RADIOLOGY | Facility: MEDICAL CENTER | Age: 28
End: 2023-09-12
Payer: MEDICAID

## 2023-09-12 ENCOUNTER — OFFICE VISIT (OUTPATIENT)
Dept: INTERNAL MEDICINE | Facility: OTHER | Age: 28
End: 2023-09-12
Payer: MEDICAID

## 2023-09-12 VITALS
TEMPERATURE: 98.5 F | HEIGHT: 65 IN | OXYGEN SATURATION: 93 % | BODY MASS INDEX: 33.82 KG/M2 | HEART RATE: 86 BPM | DIASTOLIC BLOOD PRESSURE: 81 MMHG | SYSTOLIC BLOOD PRESSURE: 122 MMHG | WEIGHT: 203 LBS

## 2023-09-12 DIAGNOSIS — M79.672 LEFT FOOT PAIN: ICD-10-CM

## 2023-09-12 LAB
CRP SERPL HS-MCNC: 3.15 MG/DL (ref 0–0.75)
ERYTHROCYTE [SEDIMENTATION RATE] IN BLOOD BY WESTERGREN METHOD: 11 MM/HOUR (ref 0–20)
FOLATE SERPL-MCNC: 19.2 NG/ML
URATE SERPL-MCNC: 9.7 MG/DL (ref 2.5–8.3)
VIT B12 SERPL-MCNC: 473 PG/ML (ref 211–911)

## 2023-09-12 PROCEDURE — 85652 RBC SED RATE AUTOMATED: CPT

## 2023-09-12 PROCEDURE — 3074F SYST BP LT 130 MM HG: CPT | Mod: GC

## 2023-09-12 PROCEDURE — 3079F DIAST BP 80-89 MM HG: CPT | Mod: GC

## 2023-09-12 PROCEDURE — 84550 ASSAY OF BLOOD/URIC ACID: CPT

## 2023-09-12 PROCEDURE — 86140 C-REACTIVE PROTEIN: CPT

## 2023-09-12 PROCEDURE — 36415 COLL VENOUS BLD VENIPUNCTURE: CPT

## 2023-09-12 PROCEDURE — 99214 OFFICE O/P EST MOD 30 MIN: CPT | Mod: GC

## 2023-09-12 PROCEDURE — 82607 VITAMIN B-12: CPT

## 2023-09-12 PROCEDURE — 82746 ASSAY OF FOLIC ACID SERUM: CPT

## 2023-09-12 ASSESSMENT — FIBROSIS 4 INDEX: FIB4 SCORE: 0.52

## 2023-09-12 NOTE — PROGRESS NOTES
Date of Service: 9/12/2023    CC: Left ankle and foot pain    HPI:  Sander Romero  is a 28 y.o. male with a medical history, including Down syndrome, vitamin D deficiency, sleep apnea, type 2 diabetes and hypothyroidism.patient presents with mother because of a 4-day history of left foot pain.  Patient has had this pain in the past but it has resolved.  Past imaging did not show any acute findings.  Mother of patient was told to bring patient in when patient was having acute pain.    Patient communicates with signs, Estonian and English.  Patient is able to indicate there is pain on his foot unable to quantify or qualify the pain.  Patient has expressed interest in prosthesis, because of severity of pain.  Mother notes patient is unable to walk on foot.  Patient usually wears sandals or shoes and crocs.  Currently on 1 crocs because other shoes hurt the dorsum of the foot.  Patient has tried elevating legs and ice which has not improved pain.  Over-the-counter medications not helped either.  Mother notes that the area sometimes feels warm.  Patient is not able to distinguish whether the foot hurts or burns.    Review of systems:  Review of Systems   Unable to perform ROS: Mental acuity        Past Medical History:  Patient Active Problem List    Diagnosis Date Noted    Left foot pain 09/13/2023    High risk medication use 07/01/2022    Obesity (BMI 30-39.9) 06/24/2022    Hyperthyroidism 01/16/2020    Graves disease 01/16/2020    Type 2 diabetes mellitus without complication, without long-term current use of insulin ([facility]) 10/22/2019    Obesity (BMI 35.0-39.9 without comorbidity) ([facility]) 05/23/2017    CAP (community acquired pneumonia) 05/12/2017    Low HDL (under 40)     Vitamin D deficiency     Sleep apnea     Undescended testicle     Down syndrome        Past Surgical History:    has a past surgical history that includes other; tonsillectomy and adenoidectomy; testicle exploration; myringotomy; and  tonsillectomy.    Medications:  Current Outpatient Medications   Medication Sig Dispense Refill    ipratropium-albuterol (DUONEB) 0.5-2.5 (3) MG/3ML nebulizer solution       metFORMIN ER (GLUCOPHAGE XR) 500 MG TABLET SR 24 HR Take 2 tablets twice daily 360 Tablet 3    methimazole (TAPAZOLE) 10 MG Tab Take 2 Tablets by mouth every day. 180 Tablet 2    Blood Glucose Monitoring Suppl Device Meter: Dispense Device of Insurance Preference. Sig. Use as directed for blood sugar monitoring. #1. NR. 1 Each 0    Blood Glucose Test Strips Test strips order:  for meter dispensed (insurance preference).  Testing one time per day E11.9 100 Strip 2    Lancets Lancets order: Lancets for meter dispensed (insurance preference) testing one time per day E10.9 100 Each 2    albuterol 108 (90 Base) MCG/ACT Aero Soln inhalation aerosol Inhale 2 Puffs every 6 hours as needed for Shortness of Breath. 8.5 g 0    propranolol (INDERAL) 10 MG Tab TAKE 1 TABLET BY MOUTH THREE TIMES DAILY   MUST keep APPT (Patient taking differently: every day. TAKE 1 TABLET BY MOUTH THREE TIMES DAILY   MUST keep APPT) 90 Tablet 3    therapeutic multivitamin-minerals (THERAGRAN-M) Tab Take 1 Tab by mouth every day. 30 Tab 0    Cholecalciferol (VITAMIN D PO) Take 4,000 Units by mouth every day.       No current facility-administered medications for this visit.       Allergies:  No Known Allergies    Family History:   family history includes Arthritis in his maternal grandmother and mother; Cancer in his paternal grandfather; Diabetes in his father, maternal grandfather, paternal grandfather, and paternal grandmother; Heart Disease in his mother; Hypertension in his mother; Stroke in his maternal grandfather.     Social History:    Social History     Tobacco Use    Smoking status: Never    Smokeless tobacco: Never   Vaping Use    Vaping Use: Never used   Substance Use Topics    Alcohol use: Yes     Alcohol/week: 0.0 oz     Comment: rarely    Drug use: No      Employment: n/a  Activity Level: Usually able to ambulate on own  Living situation:  lives with mother      Physical Exam:  Vitals:    09/12/23 1009   BP: 122/81   Pulse: 86   Temp: 36.9 °C (98.5 °F)   SpO2: 93%     Body mass index is 33.78 kg/m².  Physical Exam  Constitutional:       General: He is not in acute distress.     Appearance: He is not ill-appearing.   HENT:      Head: Normocephalic and atraumatic.   Eyes:      Extraocular Movements: Extraocular movements intact.      Pupils: Pupils are equal, round, and reactive to light.   Cardiovascular:      Rate and Rhythm: Normal rate and regular rhythm.      Pulses: Normal pulses.      Heart sounds: Normal heart sounds. No murmur heard.     No gallop.   Pulmonary:      Effort: Pulmonary effort is normal. No respiratory distress.      Breath sounds: Normal breath sounds. No wheezing or rales.   Musculoskeletal:      Right lower leg: No deformity. No edema.      Left lower leg: No deformity. No edema.      Right foot: No swelling or deformity.      Left foot: No swelling or deformity.      Comments: Pain on dorsum of left food through ankle. Tenderness with palpation.   Difficulty bearing weight. No signs of deformity.    Neurological:      General: No focal deficit present.      Mental Status: He is alert and oriented to person, place, and time.          Labs:  none    Imaging:  none    Assessment/Plan:  Left foot pain  Pain in dorsum of left foot.  Involving ankles pain with pressure and squeezing.  Concern for strain versus neuroma versus bony involvement.  Because of patient's challenges with communication differential disorder.  No obvious signs of erythema swelling or gout flare present.  Presenting appear to have pain in the past.    Plan:  - Initially ordered ultrasound as told by radiologist that MRI is the gold standard ordered right foot  -Uric acid, folate, B12, CRP, ESR           All imaging results and lab results and consult notes are reviewed at  this visit.  Followup: Return in about 1 month (around 10/12/2023).    Destiny Malave MD  Internal Medicine PGY-3

## 2023-09-13 ENCOUNTER — TELEPHONE (OUTPATIENT)
Dept: INTERNAL MEDICINE | Facility: OTHER | Age: 28
End: 2023-09-13
Payer: MEDICAID

## 2023-09-13 PROBLEM — M79.672 LEFT FOOT PAIN: Status: ACTIVE | Noted: 2023-09-13

## 2023-09-13 NOTE — ASSESSMENT & PLAN NOTE
Pain in dorsum of left foot.  Involving ankles pain with pressure and squeezing.  Concern for strain versus neuroma versus bony involvement.  Because of patient's challenges with communication differential disorder.  No obvious signs of erythema swelling or gout flare present.  Presenting appear to have pain in the past.    Plan:  - Initially ordered ultrasound as told by radiologist that MRI is the gold standard ordered right foot  -Uric acid, folate, B12, CRP, ESR

## 2023-09-14 ENCOUNTER — APPOINTMENT (OUTPATIENT)
Dept: RADIOLOGY | Facility: MEDICAL CENTER | Age: 28
End: 2023-09-14
Payer: MEDICAID

## 2023-09-14 DIAGNOSIS — M79.672 LEFT FOOT PAIN: ICD-10-CM

## 2023-09-14 PROCEDURE — 73718 MRI LOWER EXTREMITY W/O DYE: CPT | Mod: LT

## 2023-09-18 ENCOUNTER — TELEPHONE (OUTPATIENT)
Dept: INTERNAL MEDICINE | Facility: OTHER | Age: 28
End: 2023-09-18
Payer: MEDICAID

## 2023-09-18 NOTE — TELEPHONE ENCOUNTER
----- Message from Destiny Malave M.D. sent at 9/13/2023  1:35 PM PDT -----  Regarding: Elevated Uric Acid  Can you give the patient's mother a call, I did leave a message. Uric acid is elevated, indicating possible Gout. Will prescribe medication which shouldn't effect is blood sugars like prednisone.

## 2023-09-18 NOTE — TELEPHONE ENCOUNTER
Phone Number Called: [phone] (home)       Call outcome: Left detailed message for patient. Informed to call back with any additional questions.    Message: Patients mother did not answer but I left message.

## 2023-09-20 NOTE — TELEPHONE ENCOUNTER
Patient called about this prescription and stated that the pharmacy hasn't received it. Please advise

## 2023-09-21 ENCOUNTER — TELEPHONE (OUTPATIENT)
Dept: INTERNAL MEDICINE | Facility: OTHER | Age: 28
End: 2023-09-21
Payer: MEDICAID

## 2023-09-21 DIAGNOSIS — M10.9 ACUTE GOUT OF LEFT ANKLE, UNSPECIFIED CAUSE: ICD-10-CM

## 2023-09-21 RX ORDER — COLCHICINE 0.6 MG/1
0.6 TABLET ORAL DAILY
Qty: 30 TABLET | Refills: 0 | Status: SHIPPED | OUTPATIENT
Start: 2023-09-21 | End: 2023-10-23 | Stop reason: SDUPTHER

## 2023-09-29 NOTE — TELEPHONE ENCOUNTER
DOCUMENTATION OF PAR STATUS:    1. Name of Medication & Dose: Colchicine 0.6mg tablets     2. Name of Prescription Coverage Company & phone #: Medicaid- [place] [employer]    3. Date Prior Auth Submitted: 09/29/23    4. What information was given to obtain insurance decision? Icd10 codes and notes    5. Prior Auth Status? Pending    6. Patient Notified: N\A

## 2023-09-29 NOTE — TELEPHONE ENCOUNTER
Phone Number Called: [facility]- [phone]    Call outcome:  spoke with pharmacist    Message: spoke with pharmacist and she stated the patient had picked up the prescription on 09/21/2023 and paid out of pocket.

## 2023-09-29 NOTE — TELEPHONE ENCOUNTER
FINAL PRIOR AUTHORIZATION STATUS:    1.  Name of Medication & Dose: Colchicine 0.6mg tabs     2. Prior Auth Status: Approved through 11/28/2023     3. Action Taken: Pharmacy Notified: yes Patient Notified: yes

## 2023-10-14 LAB
25(OH)D3+25(OH)D2 SERPL-MCNC: 24.3 NG/ML (ref 30–100)
ALBUMIN SERPL-MCNC: 4.3 G/DL (ref 4.3–5.2)
ALBUMIN/CREAT UR: 2 MG/G CREAT (ref 0–29)
ALBUMIN/GLOB SERPL: 1.2 {RATIO} (ref 1.2–2.2)
ALP SERPL-CCNC: 126 IU/L (ref 44–121)
ALT SERPL-CCNC: 99 IU/L (ref 0–44)
AST SERPL-CCNC: 61 IU/L (ref 0–40)
BILIRUB SERPL-MCNC: 0.4 MG/DL (ref 0–1.2)
BUN SERPL-MCNC: 14 MG/DL (ref 6–20)
BUN/CREAT SERPL: 16 (ref 9–20)
CALCIUM SERPL-MCNC: 9.4 MG/DL (ref 8.7–10.2)
CHLORIDE SERPL-SCNC: 100 MMOL/L (ref 96–106)
CHOLEST SERPL-MCNC: 173 MG/DL (ref 100–199)
CO2 SERPL-SCNC: 23 MMOL/L (ref 20–29)
CREAT SERPL-MCNC: 0.88 MG/DL (ref 0.76–1.27)
CREAT UR-MCNC: 150.5 MG/DL
EGFRCR SERPLBLD CKD-EPI 2021: 120 ML/MIN/1.73
GLOBULIN SER CALC-MCNC: 3.7 G/DL (ref 1.5–4.5)
GLUCOSE SERPL-MCNC: 131 MG/DL (ref 70–99)
HDLC SERPL-MCNC: 33 MG/DL
LABORATORY COMMENT REPORT: ABNORMAL
LDLC SERPL CALC-MCNC: 89 MG/DL (ref 0–99)
MICROALBUMIN UR-MCNC: 3.7 UG/ML
POTASSIUM SERPL-SCNC: 4.1 MMOL/L (ref 3.5–5.2)
PROT SERPL-MCNC: 8 G/DL (ref 6–8.5)
SODIUM SERPL-SCNC: 136 MMOL/L (ref 134–144)
T3FREE SERPL-MCNC: 3.1 PG/ML (ref 2–4.4)
T4 FREE SERPL-MCNC: 1.12 NG/DL (ref 0.82–1.77)
TRIGL SERPL-MCNC: 306 MG/DL (ref 0–149)
TSH SERPL DL<=0.005 MIU/L-ACNC: 5.43 UIU/ML (ref 0.45–4.5)
VLDLC SERPL CALC-MCNC: 51 MG/DL (ref 5–40)

## 2023-10-19 ENCOUNTER — OFFICE VISIT (OUTPATIENT)
Dept: ENDOCRINOLOGY | Facility: MEDICAL CENTER | Age: 28
End: 2023-10-19
Attending: INTERNAL MEDICINE
Payer: MEDICAID

## 2023-10-19 VITALS
HEIGHT: 65 IN | DIASTOLIC BLOOD PRESSURE: 69 MMHG | BODY MASS INDEX: 34.82 KG/M2 | SYSTOLIC BLOOD PRESSURE: 107 MMHG | OXYGEN SATURATION: 94 % | HEART RATE: 92 BPM | WEIGHT: 209 LBS

## 2023-10-19 DIAGNOSIS — E05.90 HYPERTHYROIDISM: ICD-10-CM

## 2023-10-19 DIAGNOSIS — E11.9 TYPE 2 DIABETES MELLITUS WITHOUT COMPLICATION, WITHOUT LONG-TERM CURRENT USE OF INSULIN (HCC): ICD-10-CM

## 2023-10-19 DIAGNOSIS — E55.9 VITAMIN D INSUFFICIENCY: ICD-10-CM

## 2023-10-19 DIAGNOSIS — E78.1 HYPERTRIGLYCERIDEMIA: ICD-10-CM

## 2023-10-19 DIAGNOSIS — E05.00 GRAVES DISEASE: ICD-10-CM

## 2023-10-19 DIAGNOSIS — Z79.899 HIGH RISK MEDICATION USE: ICD-10-CM

## 2023-10-19 LAB
HBA1C MFR BLD: 6 % (ref ?–5.8)
POCT INT CON NEG: NEGATIVE
POCT INT CON POS: POSITIVE

## 2023-10-19 PROCEDURE — 83036 HEMOGLOBIN GLYCOSYLATED A1C: CPT | Performed by: INTERNAL MEDICINE

## 2023-10-19 PROCEDURE — 92250 FUNDUS PHOTOGRAPHY W/I&R: CPT | Performed by: INTERNAL MEDICINE

## 2023-10-19 PROCEDURE — 99214 OFFICE O/P EST MOD 30 MIN: CPT | Performed by: INTERNAL MEDICINE

## 2023-10-19 PROCEDURE — 99213 OFFICE O/P EST LOW 20 MIN: CPT | Performed by: INTERNAL MEDICINE

## 2023-10-19 PROCEDURE — 3074F SYST BP LT 130 MM HG: CPT | Performed by: INTERNAL MEDICINE

## 2023-10-19 PROCEDURE — 3078F DIAST BP <80 MM HG: CPT | Performed by: INTERNAL MEDICINE

## 2023-10-19 RX ORDER — METHIMAZOLE 10 MG/1
15 TABLET ORAL DAILY
Qty: 180 TABLET | Refills: 2 | Status: SHIPPED | OUTPATIENT
Start: 2023-10-19

## 2023-10-19 ASSESSMENT — FIBROSIS 4 INDEX: FIB4 SCORE: 0.61

## 2023-10-19 NOTE — PROGRESS NOTES
"Endocrinology Clinic Progress Note  PCP: Destiny Malave M.D.    HPI:  Sander Romero is a 28 y.o. old patient who is seen today by the Diabetes Nurse Specialist for review of their diabetes.   Recent changes in health: recently diagnosed with gout   DM:   Last A1c:   Lab Results   Component Value Date/Time    HBA1C 6.0 (A) 10/19/2023 01:17 PM      Previous A1c was 6 on 5/2/23  A1C GOAL: < 7    Diabetes Medications:   Metformin 1000 mg bid      Exercise: walking  Diet: \"healthy\" diet  in general  Patient's body mass index is 34.78 kg/m². Exercise and nutrition counseling were performed at this visit.    Glucose monitoring frequency: checking on occasion  Hypoglycemic episodes: no  Last Retinal Exam:  poc retina completed in office    Foot Exam:  Monofilament: current      Plan:     Discussed and educated on:   - All medications, side effects and compliance (discussed carefully)  - Annual eye examinations at Ophthalmology  - Foot Care: what to look for when checking feet every day  - HbA1C: target  - Home glucose monitoring emphasized  - Weight control and daily exercise    Recommended medication changes: none    "

## 2023-10-19 NOTE — PROGRESS NOTES
Chief Complaint: Follow up for Hyperthyroidism secondary to Grave's disease and type 2 diabetes and history of Down syndrome.     HPI:     Sander Romero is a 27 y.o. male here for follow up of of the above medical issues.   She was previously a patient of KATHERINE Brush.    He has Hyperthyroidism first diagnosed in April 2019 with a positive thyroid uptake and scan from May 2019 showing increased 5-hour uptake of 65% c/w Graves' disease.   Previously his hyperthyroidism was difficult to control due to noncompliance with methimazole therapy.  Patient has Down syndrome and he does not like taking medications.  His mother also originally did not understand why the patient had to take methimazole.      Currently he is taking methimazole 20 mg daily  He reports better compliance  Tsh is 5.4 on 20mg on 10/2023  TSH was 2.4 on 20mg on 4/2023  TSH was 3.1 on 30mg on 10//2022          Patient also has type 2 diabetes initially discovered in Aug 2018.    His A1c was 6.0% on 10?19/2023    He is on Metformin  mg 2 pills  twice a day.      He denies Se with his metformin   He is trying to follow low carb - his mom is watching his diet and portions      He as new hypertriglyceridemia  But note TSH was high which was high when this was done   Latest Reference Range & Units 10/13/23 04:17   Cholesterol,Tot 100 - 199 mg/dL 173   Triglycerides 0 - 149 mg/dL 306 (H)   HDL >39 mg/dL 33 (L)   LDL Chol Calc (NIH) 0 - 99 mg/dL 89   VLDL Cholesterol Calc 5 - 40 mg/dL 51 (H)         He does not have diabetic kidney disease   Latest Reference Range & Units 10/13/23 04:17   Creatinine, Random Urine Not Estab. mg/dL 150.5   Microalbumin, Urine Random Not Estab. ug/mL 3.7   Microalbumin-Creatinine 0 - 29 mg/g creat 2       Eye exam was on done today on 10/19/2023          Patient's medications, allergies, and social histories were reviewed and updated as appropriate.      ROS:     CONS:     No fever, no chills   EYES:      "No diplopia, no blurry vision   CV:           No chest pain, no palpitations   PULM:     No SOB, no cough, no hemoptysis.   GI:            No nausea, no vomiting, no diarrhea, no constipation   ENDO:     No polyuria, no polydipsia, no heat intolerance, no cold intolerance       Past Medical History:  Problem List:  2023-09: Left foot pain  2022-07: High risk medication use  2022-06: Obesity (BMI 30-39.9)  2020-01: Hyperthyroidism  2020-01: Graves disease  2019-10: Type 2 diabetes mellitus without complication, without long-  term current use of insulin ([facility])  2017-05: Obesity (BMI 35.0-39.9 without comorbidity) ([facility])  2017-05: Sepsis(995.91)  2017-05: CAP (community acquired pneumonia)  Down syndrome  Low HDL (under 40)  Vitamin D deficiency  Impaired fasting glucose  Sleep apnea  Undescended testicle      Past Surgical History:  Past Surgical History:   Procedure Laterality Date    MYRINGOTOMY      OTHER      surgery for undescended testies.    TESTICLE EXPLORATION      undescended testicle    TONSILLECTOMY      TONSILLECTOMY AND ADENOIDECTOMY          Allergies:  Patient has no known allergies.     Social History:  Social History     Tobacco Use    Smoking status: Never    Smokeless tobacco: Never   Vaping Use    Vaping Use: Never used   Substance Use Topics    Alcohol use: Yes     Alcohol/week: 0.0 oz     Comment: rarely    Drug use: No        Family History:   family history includes Arthritis in his maternal grandmother and mother; Cancer in his paternal grandfather; Diabetes in his father, maternal grandfather, paternal grandfather, and paternal grandmother; Heart Disease in his mother; Hypertension in his mother; Stroke in his maternal grandfather.      PHYSICAL EXAM:   Vital signs: /69   Pulse 92   Ht 1.651 m (5' 5\")   Wt 94.8 kg (209 lb)   SpO2 94%   BMI 34.78 kg/m²   GENERAL: Obese young gentleman with Down syndrome in no apparent distress.   EYE:  No ocular asymmetry, PERRLA, No exophthalmos or " lid lag  HENT: Pink, moist mucous membranes.    NECK: No thyromegaly.   CARDIOVASCULAR:  No murmurs  LUNGS: Clear breath sounds  ABDOMEN: Soft, nontender   EXTREMITIES: No clubbing, cyanosis, or edema.   NEUROLOGICAL: No gross focal motor abnormalities, there is visible tremors with both hands  LYMPH: No cervical adenopathy palpated.   SKIN: No rashes, lesions.       Labs:  Lab Results   Component Value Date/Time    WBC 6.2 06/20/2022 06:57 AM    WBC 7.0 02/11/2021 09:31 AM    RBC 4.86 06/20/2022 06:57 AM    RBC 4.70 02/11/2021 09:31 AM    HEMOGLOBIN 16.0 06/20/2022 06:57 AM    HEMOGLOBIN 15.2 02/11/2021 09:31 AM    MCV 97 06/20/2022 06:57 AM    MCV 95.3 02/11/2021 09:31 AM    MCH 32.9 06/20/2022 06:57 AM    MCH 32.3 02/11/2021 09:31 AM    MCHC 34.0 06/20/2022 06:57 AM    MCHC 33.9 02/11/2021 09:31 AM    RDW 13.3 06/20/2022 06:57 AM    RDW 43.4 02/11/2021 09:31 AM    MPV 9.0 02/11/2021 09:31 AM       Lab Results   Component Value Date/Time    SODIUM 136 10/13/2023 04:17 AM    SODIUM 137 06/29/2021 01:33 PM    POTASSIUM 4.1 10/13/2023 04:17 AM    POTASSIUM 4.1 06/29/2021 01:33 PM    CHLORIDE 100 10/13/2023 04:17 AM    CHLORIDE 101 06/29/2021 01:33 PM    CO2 23 10/13/2023 04:17 AM    CO2 24 06/29/2021 01:33 PM    ANION 12.0 06/29/2021 01:33 PM    GLUCOSE 131 (H) 10/13/2023 04:17 AM    GLUCOSE 145 (H) 06/29/2021 01:33 PM    BUN 14 10/13/2023 04:17 AM    BUN 8 06/29/2021 01:33 PM    CREATININE 0.88 10/13/2023 04:17 AM    CREATININE 0.76 06/29/2021 01:33 PM    CALCIUM 9.4 10/13/2023 04:17 AM    CALCIUM 8.7 06/29/2021 01:33 PM    ASTSGOT 61 (H) 10/13/2023 04:17 AM    ASTSGOT 27 06/29/2021 01:33 PM    ALTSGPT 99 (H) 10/13/2023 04:17 AM    ALTSGPT 41 06/29/2021 01:33 PM    TBILIRUBIN 0.4 10/13/2023 04:17 AM    TBILIRUBIN 0.5 06/29/2021 01:33 PM    ALBUMIN 4.3 10/13/2023 04:17 AM    ALBUMIN 3.52 (L) 06/29/2021 01:33 PM    ALBUMIN 3.7 06/29/2021 01:33 PM    TOTPROTEIN 8.0 10/13/2023 04:17 AM    TOTPROTEIN 7.3 06/29/2021 01:33  PM    TOTPROTEIN 7.6 06/29/2021 01:33 PM    GLOBULIN 3.7 10/13/2023 04:17 AM    GLOBULIN 3.9 (H) 06/29/2021 01:33 PM    AGRATIO 1.2 10/13/2023 04:17 AM    AGRATIO 0.9 06/29/2021 01:33 PM       Lab Results   Component Value Date/Time    TSHULTRASEN <0.005 (L) 01/13/2020 0850     Lab Results   Component Value Date/Time    FREET4 3.08 (H) 01/13/2020 0850     Lab Results   Component Value Date/Time    FREET3 20.30 (H) 01/13/2020 0850     No results found for: THYSTIMIG      Imaging: Please refer to thyroid uptake and scan from May 2019      ASSESSMENT/PLAN:     1. Hyperthyroidism  Uncontrolled   Tsh is slightly high  Adjust methimazole to 15 mg daily  Follow-up in 2 months with repeat of TSH free T4 and free T3 level    2. Graves disease  This is the etiology of his hyperthyroidism    3. Type 2 diabetes mellitus without complication, without long-term current use of insulin (HCC)  Fairly controlled  A1c is better at 6.0%  Continue  metformin to 1000 mg twice a day  He is up-to-date with his labs  Eye exam was done today  Follow-up in 4 months with repeat of A1c      4. Hypertriglyceridemia  Uncontrolled  This could have been impacted by his high TSH  Repeat lipids with next labs    5. Vitamin D insufficiency  Uncontrolled  Continue vitamin D3 5000IU daily    6. High risk medication use  Patient is taking methimazole which is a high risk medication      Return in about 2 months (around 12/19/2023).      Thank you kindly for allowing me to participate in the thyroid care plan for this patient.    David Kessler MD, FACE, ECNU      CC:   JOSE MIGUEL Jose

## 2023-10-23 ENCOUNTER — OFFICE VISIT (OUTPATIENT)
Dept: INTERNAL MEDICINE | Facility: OTHER | Age: 28
End: 2023-10-23
Payer: MEDICAID

## 2023-10-23 VITALS
HEART RATE: 84 BPM | TEMPERATURE: 97.3 F | SYSTOLIC BLOOD PRESSURE: 93 MMHG | WEIGHT: 209 LBS | OXYGEN SATURATION: 97 % | HEIGHT: 65 IN | DIASTOLIC BLOOD PRESSURE: 64 MMHG | BODY MASS INDEX: 34.82 KG/M2

## 2023-10-23 DIAGNOSIS — M10.9 ACUTE GOUT OF LEFT ANKLE, UNSPECIFIED CAUSE: ICD-10-CM

## 2023-10-23 DIAGNOSIS — K42.9 UMBILICAL HERNIA WITHOUT OBSTRUCTION AND WITHOUT GANGRENE: ICD-10-CM

## 2023-10-23 DIAGNOSIS — M1A.9XX0 CHRONIC GOUT WITHOUT TOPHUS, UNSPECIFIED CAUSE, UNSPECIFIED SITE: ICD-10-CM

## 2023-10-23 DIAGNOSIS — M1A.0720 IDIOPATHIC CHRONIC GOUT OF LEFT FOOT WITHOUT TOPHUS: ICD-10-CM

## 2023-10-23 PROCEDURE — 99214 OFFICE O/P EST MOD 30 MIN: CPT | Mod: GC

## 2023-10-23 PROCEDURE — 3078F DIAST BP <80 MM HG: CPT

## 2023-10-23 PROCEDURE — 3074F SYST BP LT 130 MM HG: CPT

## 2023-10-23 RX ORDER — COLCHICINE 0.6 MG/1
0.6 TABLET ORAL DAILY
Qty: 30 TABLET | Refills: 2 | Status: SHIPPED | OUTPATIENT
Start: 2023-10-23

## 2023-10-23 RX ORDER — ALLOPURINOL 100 MG/1
100 TABLET ORAL DAILY
Qty: 30 TABLET | Refills: 2 | Status: SHIPPED | OUTPATIENT
Start: 2023-10-23 | End: 2024-01-23

## 2023-10-23 ASSESSMENT — FIBROSIS 4 INDEX: FIB4 SCORE: 0.61

## 2023-10-23 ASSESSMENT — PATIENT HEALTH QUESTIONNAIRE - PHQ9: CLINICAL INTERPRETATION OF PHQ2 SCORE: 0

## 2023-10-23 NOTE — PROGRESS NOTES
Date of Service:  10/23/2023    CC: Follow up, swelling in belly button    HPI:  Sander Romero  is a 28 y.o. male with a medical history of down syndrome, low HDL, sleep apnea, type 2 diabetes mellitus, obesity, and Graves Disease. Patient presented one month ago with left foot pain in greater metatarsal area, through ankle. Patient presents with mother for follow up and to discuss swelling in his belly button. Patient had a hard time moving ankle and it was tender to touch. Patient's uric acid level was extremely elevated at 9.7. His acute gout diagnosis rule was 11.5, (82.5% risk). Patient was started on colchicine and that has helped. He has had some minor pain in that area off and on, which colchicine has helped.   Mother is concerned about swelling in belly button. It appears to be an umbilical hernia. Patient only complains of pain when lifting heavy objects. He has not had any nausea, or vomiting. There has been some diarrhea, but no extreme pain or erythema in the area.       Review of systems:  ROS     Past Medical History:  Patient Active Problem List    Diagnosis Date Noted    Umbilical hernia without obstruction and without gangrene 10/25/2023    Hypertriglyceridemia 10/19/2023    Chronic idiopathic gout of left foot 09/13/2023    High risk medication use 07/01/2022    Hyperthyroidism 01/16/2020    Graves disease 01/16/2020    Type 2 diabetes mellitus without complication, without long-term current use of insulin ([facility]) 10/22/2019    Obesity (BMI 35.0-39.9 without comorbidity) ([facility]) 05/23/2017    Low HDL (under 40)     Vitamin D deficiency     Sleep apnea     Undescended testicle     Down syndrome        Past Surgical History:    has a past surgical history that includes other; tonsillectomy and adenoidectomy; testicle exploration; myringotomy; and tonsillectomy.    Medications:  Current Outpatient Medications   Medication Sig Dispense Refill    colchicine (COLCRYS) 0.6 MG Tab Take 1 Tablet by  mouth every day. 30 Tablet 2    allopurinol (ZYLOPRIM) 100 MG Tab Take 1 Tablet by mouth every day. 30 Tablet 2    methimazole (TAPAZOLE) 10 MG Tab Take 1.5 Tablets by mouth every day. 180 Tablet 2    ipratropium-albuterol (DUONEB) 0.5-2.5 (3) MG/3ML nebulizer solution       metFORMIN ER (GLUCOPHAGE XR) 500 MG TABLET SR 24 HR Take 2 tablets twice daily 360 Tablet 3    Blood Glucose Monitoring Suppl Device Meter: Dispense Device of Insurance Preference. Sig. Use as directed for blood sugar monitoring. #1. NR. 1 Each 0    Blood Glucose Test Strips Test strips order:  for meter dispensed (insurance preference).  Testing one time per day E11.9 100 Strip 2    Lancets Lancets order: Lancets for meter dispensed (insurance preference) testing one time per day E10.9 100 Each 2    albuterol 108 (90 Base) MCG/ACT Aero Soln inhalation aerosol Inhale 2 Puffs every 6 hours as needed for Shortness of Breath. 8.5 g 0    propranolol (INDERAL) 10 MG Tab TAKE 1 TABLET BY MOUTH THREE TIMES DAILY   MUST keep APPT (Patient taking differently: every day. TAKE 1 TABLET BY MOUTH THREE TIMES DAILY   MUST keep APPT) 90 Tablet 3    therapeutic multivitamin-minerals (THERAGRAN-M) Tab Take 1 Tab by mouth every day. 30 Tab 0    Cholecalciferol (VITAMIN D PO) Take 4,000 Units by mouth every day.       No current facility-administered medications for this visit.       Allergies:  No Known Allergies    Family History:   family history includes Arthritis in his maternal grandmother and mother; Cancer in his paternal grandfather; Diabetes in his father, maternal grandfather, paternal grandfather, and paternal grandmother; Heart Disease in his mother; Hypertension in his mother; Stroke in his maternal grandfather.     Social History:    Social History     Tobacco Use    Smoking status: Never    Smokeless tobacco: Never   Vaping Use    Vaping Use: Never used   Substance Use Topics    Alcohol use: Yes     Alcohol/week: 0.0 oz     Comment: rarely    Drug  use: No     Employment: Disability   Activity Level: Mod  Living situation:  lives with mother  Recent travel:  n/a  Other (stressors, spirituality, exposures):  Patient speaks some english and some Mongolian, limited verbal responses    Physical Exam:  Vitals:    10/23/23 1539   BP: 93/64   Pulse: 84   Temp: 36.3 °C (97.3 °F)   SpO2: 97%     Body mass index is 34.78 kg/m².  Physical Exam  Constitutional:       Appearance: He is obese.   HENT:      Head: Normocephalic and atraumatic.   Eyes:      Extraocular Movements: Extraocular movements intact.      Pupils: Pupils are equal, round, and reactive to light.   Cardiovascular:      Rate and Rhythm: Normal rate and regular rhythm.      Pulses: Normal pulses.      Heart sounds: Normal heart sounds. No murmur heard.     No gallop.   Pulmonary:      Effort: Pulmonary effort is normal. No respiratory distress.      Breath sounds: Normal breath sounds. No wheezing or rales.   Abdominal:      General: Abdomen is protuberant.      Palpations: Abdomen is soft. There is no shifting dullness, fluid wave, hepatomegaly or mass.      Tenderness: There is no right CVA tenderness or guarding.      Hernia: A hernia is present. Hernia is present in the umbilical area.      Comments: Umbilical hernia approx 1.5 cm in diameter. No erythremia, reproducible   Musculoskeletal:      Right lower leg: No edema.      Left lower leg: No edema.      Comments: Right foot: no tenderness to touch, ROM intact, no swelling or erythema noted   Skin:     General: Skin is warm and dry.   Neurological:      General: No focal deficit present.      Mental Status: He is alert and oriented to person, place, and time.          Labs:  Uric acid: 9.7    Imaging:  MRI right foot: No evidence of marrow edema, fracture or arthropathy, no evidence of soft tissue mass or fluid collection interposed between phalangeal bases or distal metatarsals. Underlying the first MTP joint planta surface 15 X14X 5 mm. This may  represent focal edema within the fat versus granulation tissue or Mcdowell's neuroma.       Assessment/Plan:  Chronic idiopathic gout of left foot  Patient on month ago had increased pain to touch, swelling and decreased ROM in left greater metatarsal through ankle joint. Gout prediction calculator shows 11.5, or 82.5% risk of being a true gouty even. Uric acid was 9.7. Started patient on colchicine. MRI shows welling in that area, which may be a Mcdowell's neuroma or swelling from gout.     Plan:  -Start Allopurinol, monitor CMP and CBC  -Colchicine for flares    Umbilical hernia without obstruction and without gangrene  Patient's mother has noted the hernia over the last month, pain with lifting heavy objects. No signs of incarceration or erythema. Explained that currently umbilical hernia is benign. Explained warning signs, such as erythema, increased pain and incarceration. Discussed elective surgery vs. Watch and wait approach. At this time patient's mother would like to do the former.            All imaging results and lab results and consult notes are reviewed at this visit.  Followup: Return in about 1 month (around 11/23/2023).    Destiny Malave MD  Internal Medicine PGY-3

## 2023-10-23 NOTE — PATIENT INSTRUCTIONS
Start Allopurinol 100 mg once per day  Use Colchicine as needed for gout flare  Labs for next visit

## 2023-10-23 NOTE — PROGRESS NOTES
Teaching Physician Attestation      Level of Participation    I have personally interviewed and examined the patient.  In addition, I discussed with the resident physician the patient's history, exam, assessment and plan in detail.  Topics listed in my addendum were the focus of the visit.  Healthcare maintenance was not addressed this visit unless listed as a topic in my addendum.  I agree with the plan as written along with the following additions/modifications:      Likely gout  -scoring tool > 8, fairly typical symtoms in setting of elevated bmi, resolved with colchicine.  MRI with focal edema.  Begin allopurinol 100qday, cbc/uric acid/cmp 4 weeks.  F/u 1 month.  Does have mild transaminitis possibly 2/2 fatty liver (seen on US).    Umbilical hernia  -discussed possibilyt of surgery referral and incarceration alarm sx, enouraged not lifting heavy weights and weight loss to try to slow pregression.      Low normal bp  -mother (caregiver) reports is chronic.  Not lightheaded regularly, encouranged hydration    Rtc 6 weeks for allopurinol titration, mild transaminitis and elevated a/p.  Future visit f/u undescended testicle per chart

## 2023-10-25 PROBLEM — M1A.0720 CHRONIC IDIOPATHIC GOUT OF LEFT FOOT: Status: ACTIVE | Noted: 2023-09-13

## 2023-10-25 PROBLEM — J18.9 CAP (COMMUNITY ACQUIRED PNEUMONIA): Status: RESOLVED | Noted: 2017-05-12 | Resolved: 2023-10-25

## 2023-10-25 PROBLEM — K42.9 UMBILICAL HERNIA WITHOUT OBSTRUCTION AND WITHOUT GANGRENE: Status: ACTIVE | Noted: 2023-10-25

## 2023-10-25 PROBLEM — E66.9 OBESITY (BMI 30-39.9): Status: RESOLVED | Noted: 2022-06-24 | Resolved: 2023-10-25

## 2023-10-25 LAB — RETINAL SCREEN: NEGATIVE

## 2023-10-25 NOTE — ASSESSMENT & PLAN NOTE
Patient on month ago had increased pain to touch, swelling and decreased ROM in left greater metatarsal through ankle joint. Gout prediction calculator shows 11.5, or 82.5% risk of being a true gouty even. Uric acid was 9.7. Started patient on colchicine. MRI shows welling in that area, which may be a Mcdowell's neuroma or swelling from gout.     Plan:  -Start Allopurinol, monitor CMP and CBC  -Colchicine for flares

## 2023-10-25 NOTE — ASSESSMENT & PLAN NOTE
Patient's mother has noted the hernia over the last month, pain with lifting heavy objects. No signs of incarceration or erythema. Explained that currently umbilical hernia is benign. Explained warning signs, such as erythema, increased pain and incarceration. Discussed elective surgery vs. Watch and wait approach. At this time patient's mother would like to do the former.

## 2023-12-07 LAB
CHOLEST SERPL-MCNC: 158 MG/DL (ref 100–199)
HDLC SERPL-MCNC: 33 MG/DL
LABORATORY COMMENT REPORT: ABNORMAL
LDLC SERPL CALC-MCNC: 90 MG/DL (ref 0–99)
T3FREE SERPL-MCNC: 2.7 PG/ML (ref 2–4.4)
T4 FREE SERPL-MCNC: 1.23 NG/DL (ref 0.82–1.77)
TRIGL SERPL-MCNC: 203 MG/DL (ref 0–149)
TSH SERPL DL<=0.005 MIU/L-ACNC: 4.51 UIU/ML (ref 0.45–4.5)
VLDLC SERPL CALC-MCNC: 35 MG/DL (ref 5–40)

## 2023-12-13 ENCOUNTER — OFFICE VISIT (OUTPATIENT)
Dept: ENDOCRINOLOGY | Facility: MEDICAL CENTER | Age: 28
End: 2023-12-13
Payer: MEDICAID

## 2023-12-13 VITALS
HEART RATE: 79 BPM | OXYGEN SATURATION: 93 % | WEIGHT: 207 LBS | DIASTOLIC BLOOD PRESSURE: 64 MMHG | SYSTOLIC BLOOD PRESSURE: 112 MMHG | HEIGHT: 65 IN | BODY MASS INDEX: 34.49 KG/M2

## 2023-12-13 DIAGNOSIS — E05.00 GRAVES DISEASE: ICD-10-CM

## 2023-12-13 DIAGNOSIS — E11.9 TYPE 2 DIABETES MELLITUS WITHOUT COMPLICATION, WITHOUT LONG-TERM CURRENT USE OF INSULIN (HCC): ICD-10-CM

## 2023-12-13 DIAGNOSIS — Z79.899 HIGH RISK MEDICATION USE: ICD-10-CM

## 2023-12-13 DIAGNOSIS — E78.1 HYPERTRIGLYCERIDEMIA: ICD-10-CM

## 2023-12-13 DIAGNOSIS — E05.90 HYPERTHYROIDISM: ICD-10-CM

## 2023-12-13 DIAGNOSIS — E55.9 VITAMIN D INSUFFICIENCY: ICD-10-CM

## 2023-12-13 PROCEDURE — 3078F DIAST BP <80 MM HG: CPT

## 2023-12-13 PROCEDURE — 99211 OFF/OP EST MAY X REQ PHY/QHP: CPT

## 2023-12-13 PROCEDURE — 3074F SYST BP LT 130 MM HG: CPT

## 2023-12-13 PROCEDURE — 99214 OFFICE O/P EST MOD 30 MIN: CPT

## 2023-12-13 ASSESSMENT — FIBROSIS 4 INDEX: FIB4 SCORE: 0.38

## 2023-12-13 NOTE — PROGRESS NOTES
Chief Complaint: Follow up for Hyperthyroidism secondary to Grave's disease and type 2 diabetes and history of Down syndrome.     HPI:     Sander Romero is a 27 y.o. male here for follow up of of the above medical issues.   She was previously a patient of KATHERINE Brush.    Hyperthyroidism  He has Hyperthyroidism first diagnosed in April 2019 with a positive thyroid uptake and scan from May 2019 showing increased 5-hour uptake of 65% c/w Graves' disease.     Previously his hyperthyroidism was difficult to control due to noncompliance with methimazole therapy.    Patient has Down syndrome and he does not like taking medications.    His mother also originally did not understand why the patient had to take methimazole.      Currently he is taking methimazole 15 mg daily  He reports better compliance   Latest Reference Range & Units 12/06/23 04:12   TSH 0.450 - 4.500 uIU/mL 4.510 (H)   Free T-4 0.82 - 1.77 ng/dL 1.23   T3,Free 2.0 - 4.4 pg/mL 2.7     2. Diabetes  Type 2  Patient also has type 2 diabetes initially discovered in Aug 2018.    His A1c was 6.0% on 10/19/2023    Current Medications:  He is on Metformin  mg 2 pills  twice a day.  He denies Se with his metformin   He is trying to follow low carb - his mom is watching his diet and portions control    3. Hypertriglyceridemia  But note TSH was high which was high when this was done   Latest Reference Range & Units 12/06/23 04:12   Cholesterol,Tot 100 - 199 mg/dL 158   Triglycerides 0 - 149 mg/dL 203 (H)   HDL >39 mg/dL 33 (L)   LDL Chol Calc ([facility]) 0 - 99 mg/dL 90   VLDL Cholesterol Calc 5 - 40 mg/dL 35       He does not have diabetic kidney disease   Latest Reference Range & Units 10/13/23 04:17   Creatinine, Random Urine Not Estab. mg/dL 150.5   Microalbumin, Urine Random Not Estab. ug/mL 3.7   Microalbumin-Creatinine 0 - 29 mg/g creat 2      Latest Reference Range & Units 12/06/23 04:13   eGFR >59 mL/min/1.73 126     Eye exam was on done  today on 10/19/2023      4. Vitamin D deficiency  Currently taking Vitamin D 3 5000 IU daily   Latest Reference Range & Units 10/13/23 04:17   25-Hydroxy   Vitamin D 25 30.0 - 100.0 ng/mL 24.3 (L)       Patient's medications, allergies, and social histories were reviewed and updated as appropriate.      ROS:     CONS:     No fever, no chills   EYES:     No diplopia, no blurry vision   CV:           No chest pain, no palpitations   PULM:     No SOB, no cough, no hemoptysis.   GI:            No nausea, no vomiting, no diarrhea, no constipation   ENDO:     No polyuria, no polydipsia, no heat intolerance, no cold intolerance       Past Medical History:  Problem List:  2023-10: Umbilical hernia without obstruction and without gangrene  2023-10: Hypertriglyceridemia  2023-09: Chronic idiopathic gout of left foot  2022-07: High risk medication use  2022-06: Obesity (BMI 30-39.9)  2020-01: Hyperthyroidism  2020-01: Graves disease  2019-10: Type 2 diabetes mellitus without complication, without long-  term current use of insulin ([facility])  2017-05: Obesity (BMI 35.0-39.9 without comorbidity) ([facility])  2017-05: Sepsis(995.91)  2017-05: CAP (community acquired pneumonia)  Down syndrome  Low HDL (under 40)  Vitamin D deficiency  Impaired fasting glucose  Sleep apnea  Undescended testicle      Past Surgical History:  Past Surgical History:   Procedure Laterality Date    MYRINGOTOMY      OTHER      surgery for undescended testies.    TESTICLE EXPLORATION      undescended testicle    TONSILLECTOMY      TONSILLECTOMY AND ADENOIDECTOMY          Allergies:  Patient has no known allergies.     Social History:  Social History     Tobacco Use    Smoking status: Never    Smokeless tobacco: Never   Vaping Use    Vaping Use: Never used   Substance Use Topics    Alcohol use: Yes     Alcohol/week: 0.0 oz     Comment: rarely    Drug use: No        Family History:   family history includes Arthritis in his maternal grandmother and mother; Cancer in  "his paternal grandfather; Diabetes in his father, maternal grandfather, paternal grandfather, and paternal grandmother; Heart Disease in his mother; Hypertension in his mother; Stroke in his maternal grandfather.      PHYSICAL EXAM:   Vital signs: /64 (BP Location: Right arm, Patient Position: Sitting, BP Cuff Size: Large adult)   Pulse 79   Ht 1.651 m (5' 5\")   Wt 93.9 kg (207 lb)   SpO2 93%   BMI 34.45 kg/m²   GENERAL: Obese young gentleman with Down syndrome in no apparent distress.   EYE:  No ocular asymmetry, PERRLA, No exophthalmos or lid lag  HENT: Pink, moist mucous membranes.    NECK: No thyromegaly.   CARDIOVASCULAR:  No murmurs  LUNGS: Clear breath sounds  ABDOMEN: Soft, nontender   EXTREMITIES: No clubbing, cyanosis, or edema.   NEUROLOGICAL: No gross focal motor abnormalities, there is visible tremors with both hands  LYMPH: No cervical adenopathy palpated.   SKIN: No rashes, lesions.       Labs:  Lab Results   Component Value Date/Time    WBC 5.7 12/06/2023 04:13 AM    WBC 7.0 02/11/2021 09:31 AM    RBC 4.72 12/06/2023 04:13 AM    RBC 4.70 02/11/2021 09:31 AM    HEMOGLOBIN 15.7 12/06/2023 04:13 AM    HEMOGLOBIN 15.2 02/11/2021 09:31 AM    MCV 95 12/06/2023 04:13 AM    MCV 95.3 02/11/2021 09:31 AM    MCH 33.3 (H) 12/06/2023 04:13 AM    MCH 32.3 02/11/2021 09:31 AM    MCHC 34.9 12/06/2023 04:13 AM    MCHC 33.9 02/11/2021 09:31 AM    RDW 13.2 12/06/2023 04:13 AM    RDW 43.4 02/11/2021 09:31 AM    MPV 9.0 02/11/2021 09:31 AM       Lab Results   Component Value Date/Time    SODIUM 136 12/06/2023 04:13 AM    SODIUM 137 06/29/2021 01:33 PM    POTASSIUM 4.3 12/06/2023 04:13 AM    POTASSIUM 4.1 06/29/2021 01:33 PM    CHLORIDE 101 12/06/2023 04:13 AM    CHLORIDE 101 06/29/2021 01:33 PM    CO2 22 12/06/2023 04:13 AM    CO2 24 06/29/2021 01:33 PM    ANION 12.0 06/29/2021 01:33 PM    GLUCOSE 118 (H) 12/06/2023 04:13 AM    GLUCOSE 145 (H) 06/29/2021 01:33 PM    BUN 11 12/06/2023 04:13 AM    BUN 8 " 06/29/2021 01:33 PM    CREATININE 0.76 12/06/2023 04:13 AM    CREATININE 0.76 06/29/2021 01:33 PM    CALCIUM 8.9 12/06/2023 04:13 AM    CALCIUM 8.7 06/29/2021 01:33 PM    ASTSGOT 50 (H) 12/06/2023 04:13 AM    ASTSGOT 27 06/29/2021 01:33 PM    ALTSGPT 94 (H) 12/06/2023 04:13 AM    ALTSGPT 41 06/29/2021 01:33 PM    TBILIRUBIN 0.4 12/06/2023 04:13 AM    TBILIRUBIN 0.5 06/29/2021 01:33 PM    ALBUMIN 3.9 (L) 12/06/2023 04:13 AM    ALBUMIN 3.52 (L) 06/29/2021 01:33 PM    ALBUMIN 3.7 06/29/2021 01:33 PM    TOTPROTEIN 7.5 12/06/2023 04:13 AM    TOTPROTEIN 7.3 06/29/2021 01:33 PM    TOTPROTEIN 7.6 06/29/2021 01:33 PM    GLOBULIN 3.6 12/06/2023 04:13 AM    GLOBULIN 3.9 (H) 06/29/2021 01:33 PM    AGRATIO 1.1 (L) 12/06/2023 04:13 AM    AGRATIO 0.9 06/29/2021 01:33 PM       Lab Results   Component Value Date/Time    TSHULTRASEN <0.005 (L) 01/13/2020 0850     Lab Results   Component Value Date/Time    FREET4 3.08 (H) 01/13/2020 0850     Lab Results   Component Value Date/Time    FREET3 20.30 (H) 01/13/2020 0850     No results found for: THYSTIMIG      Imaging: Please refer to thyroid uptake and scan from May 2019      ASSESSMENT/PLAN:   1. Type 2 diabetes mellitus without complication, without long-term current use of insulin (HCC)  Stable  A1c 6.0%  Medication:   metformin to 1000 mg twice a day - continue  He is up-to-date with his labs  Eye exam was done today  - HEMOGLOBIN A1C; Future  - Comp Metabolic Panel; Future    2. Hyperthyroidism  Uncontrolled   Tsh is slightly high  Medication:  Methimazole 15 mg daily - change to Methimazole 10 mg daily  Follow-up in 6 weeks with repeat of TSH free T4 and free T3 level  - TSH; Future  - T3 FREE; Future  - FREE THYROXINE; Future    3. Graves disease  This is the etiology of his hyperthyroidism    4. Hypertriglyceridemia  Uncontrolled  This could have been impacted by his high TSH  We will continue to monitor    5. Vitamin D insufficiency  Unstable  Continue current regimen - see  HPI  - VITAMIN D,25 HYDROXY (DEFICIENCY); Future    6. High risk medication use  Patient is taking methimazole which is a high risk medication     Return in about 6 weeks (around 1/24/2024). Patient will have blood work done 1 week prior to follow up in 6 weeks.       Thank you kindly for allowing me to participate in the thyroid care plan for this patient.    Miller Pretty, GLENNY   12/13/23    CC:   JOSE MIGUEL Jose

## 2024-01-22 ENCOUNTER — HOSPITAL ENCOUNTER (OUTPATIENT)
Dept: LAB | Facility: MEDICAL CENTER | Age: 29
End: 2024-01-22
Payer: MEDICAID

## 2024-01-22 DIAGNOSIS — E05.90 HYPERTHYROIDISM: ICD-10-CM

## 2024-01-22 DIAGNOSIS — M10.9 ACUTE GOUT OF LEFT ANKLE, UNSPECIFIED CAUSE: ICD-10-CM

## 2024-01-22 DIAGNOSIS — E11.9 TYPE 2 DIABETES MELLITUS WITHOUT COMPLICATION, WITHOUT LONG-TERM CURRENT USE OF INSULIN (HCC): ICD-10-CM

## 2024-01-22 DIAGNOSIS — E55.9 VITAMIN D INSUFFICIENCY: ICD-10-CM

## 2024-01-22 LAB
25(OH)D3 SERPL-MCNC: 26 NG/ML (ref 30–100)
ALBUMIN SERPL BCP-MCNC: 4 G/DL (ref 3.2–4.9)
ALBUMIN/GLOB SERPL: 0.9 G/DL
ALP SERPL-CCNC: 138 U/L (ref 30–99)
ALT SERPL-CCNC: 97 U/L (ref 2–50)
ANION GAP SERPL CALC-SCNC: 11 MMOL/L (ref 7–16)
AST SERPL-CCNC: 59 U/L (ref 12–45)
BILIRUB SERPL-MCNC: 0.5 MG/DL (ref 0.1–1.5)
BUN SERPL-MCNC: 16 MG/DL (ref 8–22)
CALCIUM ALBUM COR SERPL-MCNC: 9 MG/DL (ref 8.5–10.5)
CALCIUM SERPL-MCNC: 9 MG/DL (ref 8.5–10.5)
CHLORIDE SERPL-SCNC: 101 MMOL/L (ref 96–112)
CO2 SERPL-SCNC: 23 MMOL/L (ref 20–33)
CREAT SERPL-MCNC: 0.75 MG/DL (ref 0.5–1.4)
EST. AVERAGE GLUCOSE BLD GHB EST-MCNC: 128 MG/DL
GFR SERPLBLD CREATININE-BSD FMLA CKD-EPI: 126 ML/MIN/1.73 M 2
GLOBULIN SER CALC-MCNC: 4.7 G/DL (ref 1.9–3.5)
GLUCOSE SERPL-MCNC: 139 MG/DL (ref 65–99)
HBA1C MFR BLD: 6.1 % (ref 4–5.6)
POTASSIUM SERPL-SCNC: 4.2 MMOL/L (ref 3.6–5.5)
PROT SERPL-MCNC: 8.7 G/DL (ref 6–8.2)
SODIUM SERPL-SCNC: 135 MMOL/L (ref 135–145)
T3FREE SERPL-MCNC: 3.31 PG/ML (ref 2–4.4)
T4 FREE SERPL-MCNC: 1.46 NG/DL (ref 0.93–1.7)
TSH SERPL DL<=0.005 MIU/L-ACNC: 0.94 UIU/ML (ref 0.38–5.33)

## 2024-01-22 PROCEDURE — 84439 ASSAY OF FREE THYROXINE: CPT

## 2024-01-22 PROCEDURE — 36415 COLL VENOUS BLD VENIPUNCTURE: CPT

## 2024-01-22 PROCEDURE — 82306 VITAMIN D 25 HYDROXY: CPT

## 2024-01-22 PROCEDURE — 80053 COMPREHEN METABOLIC PANEL: CPT

## 2024-01-22 PROCEDURE — 83036 HEMOGLOBIN GLYCOSYLATED A1C: CPT

## 2024-01-22 PROCEDURE — 84443 ASSAY THYROID STIM HORMONE: CPT

## 2024-01-22 PROCEDURE — 84481 FREE ASSAY (FT-3): CPT

## 2024-01-22 NOTE — TELEPHONE ENCOUNTER
Received request via: Pharmacy    Was the patient seen in the last year in this department? Yes    Does the patient have an active prescription (recently filled or refills available) for medication(s) requested? No    Pharmacy Name: [facility]    Does the patient have long-term Plus and need 100 day supply (blood pressure, diabetes and cholesterol meds only)? Patient does not have SCP

## 2024-01-23 RX ORDER — ALLOPURINOL 100 MG/1
100 TABLET ORAL DAILY
Qty: 30 TABLET | Refills: 3 | Status: SHIPPED | OUTPATIENT
Start: 2024-01-23

## 2024-01-25 ENCOUNTER — OFFICE VISIT (OUTPATIENT)
Dept: ENDOCRINOLOGY | Facility: MEDICAL CENTER | Age: 29
End: 2024-01-25
Payer: MEDICAID

## 2024-01-25 VITALS
SYSTOLIC BLOOD PRESSURE: 120 MMHG | WEIGHT: 210 LBS | OXYGEN SATURATION: 99 % | HEART RATE: 83 BPM | HEIGHT: 64 IN | BODY MASS INDEX: 35.85 KG/M2 | DIASTOLIC BLOOD PRESSURE: 64 MMHG

## 2024-01-25 DIAGNOSIS — E78.1 HYPERTRIGLYCERIDEMIA: ICD-10-CM

## 2024-01-25 DIAGNOSIS — E05.00 GRAVES DISEASE: ICD-10-CM

## 2024-01-25 DIAGNOSIS — E05.90 HYPERTHYROIDISM: ICD-10-CM

## 2024-01-25 DIAGNOSIS — Z79.899 HIGH RISK MEDICATION USE: ICD-10-CM

## 2024-01-25 DIAGNOSIS — E55.9 VITAMIN D INSUFFICIENCY: ICD-10-CM

## 2024-01-25 DIAGNOSIS — E11.9 TYPE 2 DIABETES MELLITUS WITHOUT COMPLICATION, WITHOUT LONG-TERM CURRENT USE OF INSULIN (HCC): ICD-10-CM

## 2024-01-25 PROCEDURE — 3078F DIAST BP <80 MM HG: CPT

## 2024-01-25 PROCEDURE — 3074F SYST BP LT 130 MM HG: CPT

## 2024-01-25 PROCEDURE — 99211 OFF/OP EST MAY X REQ PHY/QHP: CPT

## 2024-01-25 PROCEDURE — 99214 OFFICE O/P EST MOD 30 MIN: CPT

## 2024-01-25 ASSESSMENT — FIBROSIS 4 INDEX: FIB4 SCORE: 0.45

## 2024-01-25 NOTE — PROGRESS NOTES
Chief Complaint: Follow up for Hyperthyroidism secondary to Grave's disease and type 2 diabetes and history of Down syndrome.     HPI:     Sander Romero is a 27 y.o. male here for follow up of of the above medical issues.   She was previously a patient of KATHERINE Brush.    Hyperthyroidism  He has Hyperthyroidism first diagnosed in April 2019 with a positive thyroid uptake and scan from May 2019 showing increased 5-hour uptake of 65% c/w Graves' disease.     Previously his hyperthyroidism was difficult to control due to noncompliance with methimazole therapy.      Patient has Down syndrome and he does not like taking medications.    His mother also originally did not understand why the patient had to take methimazole.    Denies: anxiety, tremors, palpitations  He is currently taking propranolol 10 mg daily     Currently he is taking methimazole 10 mg daily  He reports better compliance   Latest Reference Range & Units 01/22/24 15:35   TSH 0.380 - 5.330 uIU/mL 0.940   Free T-4 0.93 - 1.70 ng/dL 1.46   T3,Free 2.00 - 4.40 pg/mL 3.31     2. Diabetes  Type 2  Patient also has type 2 diabetes initially discovered in Aug 2018.    His A1c was 6.1% on 1/22/24    Current Medications:  He is on Metformin  mg 2 pills  twice a day.  He denies Se with his metformin   He is trying to follow low carb - his mom is watching his diet and portions control    3. Hypertriglyceridemia  But note TSH was high which was high when this was done   Latest Reference Range & Units 12/06/23 04:12   Cholesterol,Tot 100 - 199 mg/dL 158   Triglycerides 0 - 149 mg/dL 203 (H)   HDL >39 mg/dL 33 (L)   LDL Chol Calc ([facility]) 0 - 99 mg/dL 90   VLDL Cholesterol Calc 5 - 40 mg/dL 35       He does not have diabetic kidney disease   Latest Reference Range & Units 10/13/23 04:17   Creatinine, Random Urine Not Estab. mg/dL 150.5   Microalbumin, Urine Random Not Estab. ug/mL 3.7   Microalbumin-Creatinine 0 - 29 mg/g creat 2      Latest  Reference Range & Units 12/06/23 04:13   eGFR >59 mL/min/1.73 126     Eye exam was on done today on 10/19/2023      4. Vitamin D deficiency  Currently taking Vitamin D 3 5000 IU daily   Latest Reference Range & Units 01/22/24 15:35   25-Hydroxy   Vitamin D 25 30 - 100 ng/mL 26 (L)         Patient's medications, allergies, and social histories were reviewed and updated as appropriate.      ROS:     CONS:     No fever, no chills   EYES:     No diplopia, no blurry vision   CV:           No chest pain, no palpitations   PULM:     No SOB, no cough, no hemoptysis.   GI:            No nausea, no vomiting, no diarrhea, no constipation   ENDO:     No polyuria, no polydipsia, no heat intolerance, no cold intolerance       Past Medical History:  Problem List:  2023-10: Umbilical hernia without obstruction and without gangrene  2023-10: Hypertriglyceridemia  2023-09: Chronic idiopathic gout of left foot  2022-07: High risk medication use  2022-06: Obesity (BMI 30-39.9)  2020-01: Hyperthyroidism  2020-01: Graves disease  2019-10: Type 2 diabetes mellitus without complication, without long-  term current use of insulin ([facility])  2017-05: Obesity (BMI 35.0-39.9 without comorbidity) ([facility])  2017-05: Sepsis(995.91)  2017-05: CAP (community acquired pneumonia)  Down syndrome  Low HDL (under 40)  Vitamin D deficiency  Impaired fasting glucose  Sleep apnea  Undescended testicle      Past Surgical History:  Past Surgical History:   Procedure Laterality Date    MYRINGOTOMY      OTHER      surgery for undescended testies.    TESTICLE EXPLORATION      undescended testicle    TONSILLECTOMY      TONSILLECTOMY AND ADENOIDECTOMY          Allergies:  Patient has no known allergies.     Social History:  Social History     Tobacco Use    Smoking status: Never    Smokeless tobacco: Never   Vaping Use    Vaping Use: Never used   Substance Use Topics    Alcohol use: Yes     Alcohol/week: 0.0 oz     Comment: rarely    Drug use: No        Family History:  "  family history includes Arthritis in his maternal grandmother and mother; Cancer in his paternal grandfather; Diabetes in his father, maternal grandfather, paternal grandfather, and paternal grandmother; Heart Disease in his mother; Hypertension in his mother; Stroke in his maternal grandfather.      PHYSICAL EXAM:   Vital signs: /64 (BP Location: Right arm, Patient Position: Sitting, BP Cuff Size: Large adult)   Pulse 83   Ht 1.626 m (5' 4\")   Wt 95.3 kg (210 lb)   SpO2 99%   BMI 36.05 kg/m²   GENERAL: Obese young gentleman with Down syndrome in no apparent distress.   EYE:  No ocular asymmetry, PERRLA, No exophthalmos or lid lag  HENT: Pink, moist mucous membranes.    NECK: No thyromegaly.   CARDIOVASCULAR:  No murmurs  LUNGS: Clear breath sounds  ABDOMEN: Soft, nontender   EXTREMITIES: No clubbing, cyanosis, or edema.   NEUROLOGICAL: No gross focal motor abnormalities, there is visible tremors with both hands  LYMPH: No cervical adenopathy palpated.   SKIN: No rashes, lesions.       Labs:  Lab Results   Component Value Date/Time    WBC 5.7 12/06/2023 04:13 AM    WBC 7.0 02/11/2021 09:31 AM    RBC 4.72 12/06/2023 04:13 AM    RBC 4.70 02/11/2021 09:31 AM    HEMOGLOBIN 15.7 12/06/2023 04:13 AM    HEMOGLOBIN 15.2 02/11/2021 09:31 AM    MCV 95 12/06/2023 04:13 AM    MCV 95.3 02/11/2021 09:31 AM    MCH 33.3 (H) 12/06/2023 04:13 AM    MCH 32.3 02/11/2021 09:31 AM    MCHC 34.9 12/06/2023 04:13 AM    MCHC 33.9 02/11/2021 09:31 AM    RDW 13.2 12/06/2023 04:13 AM    RDW 43.4 02/11/2021 09:31 AM    MPV 9.0 02/11/2021 09:31 AM       Lab Results   Component Value Date/Time    SODIUM 135 01/22/2024 03:35 PM    POTASSIUM 4.2 01/22/2024 03:35 PM    CHLORIDE 101 01/22/2024 03:35 PM    CO2 23 01/22/2024 03:35 PM    ANION 11.0 01/22/2024 03:35 PM    GLUCOSE 139 (H) 01/22/2024 03:35 PM    BUN 16 01/22/2024 03:35 PM    CREATININE 0.75 01/22/2024 03:35 PM    CALCIUM 9.0 01/22/2024 03:35 PM    ASTSGOT 59 (H) 01/22/2024 " 03:35 PM    ALTSGPT 97 (H) 01/22/2024 03:35 PM    TBILIRUBIN 0.5 01/22/2024 03:35 PM    ALBUMIN 4.0 01/22/2024 03:35 PM    ALBUMIN 3.52 (L) 06/29/2021 01:33 PM    TOTPROTEIN 8.7 (H) 01/22/2024 03:35 PM    TOTPROTEIN 7.3 06/29/2021 01:33 PM    GLOBULIN 4.7 (H) 01/22/2024 03:35 PM    AGRATIO 0.9 01/22/2024 03:35 PM       Lab Results   Component Value Date/Time    TSHULTRASEN <0.005 (L) 01/13/2020 0850     Lab Results   Component Value Date/Time    FREET4 3.08 (H) 01/13/2020 0850     Lab Results   Component Value Date/Time    FREET3 20.30 (H) 01/13/2020 0850     No results found for: THYSTIMIG      Imaging: Please refer to thyroid uptake and scan from May 2019      ASSESSMENT/PLAN:   1. Type 2 diabetes mellitus without complication, without long-term current use of insulin (HCC)  Stable  A1C 6.1 on 1/22  Medication:  Metformin 1000 mg BID - continue  Recommend diet low in fats and carbs  - MICROALBUMIN CREAT RATIO URINE; Future  - Comp Metabolic Panel; Future  - T3 FREE; Future    2. Hyperthyroidism  Stable  Medication:  Methimazole 10 mg daily - continue  Propranolol 10 mg daily - stop   repeat of TSH free T4 and free T3 levels in 3 months.   - TSH; Future  - FREE THYROXINE; Future  - T3 FREE; Future    3. Graves disease  This is the etiology of diagnosis #2    4. Hypertriglyceridemia  Unstable  Repeat levels in 3 months  Recommend diet low in fats and carbs  - Lipid Profile; Future     5. Vitamin D insufficiency  Unstable  Recommend vitamin D 3 5000 IU alternating every other day with 34907 IU  - VITAMIN D,25 HYDROXY (DEFICIENCY); Future    6. High risk medication use  Patient is taking methimazole which is a high risk medication     Return in about 3 months (around 4/25/2024). Patient will have blood work done 1 week prior to follow up in 3 months.       Thank you kindly for allowing me to participate in the thyroid care plan for this patient.    Miller Pretty, APRN   1/25/24    CC:   JOSE MIGUEL Jose

## 2024-02-06 ENCOUNTER — HOSPITAL ENCOUNTER (EMERGENCY)
Facility: MEDICAL CENTER | Age: 29
End: 2024-02-06
Attending: EMERGENCY MEDICINE
Payer: MEDICAID

## 2024-02-06 VITALS
OXYGEN SATURATION: 95 % | RESPIRATION RATE: 15 BRPM | SYSTOLIC BLOOD PRESSURE: 117 MMHG | TEMPERATURE: 99 F | WEIGHT: 210 LBS | BODY MASS INDEX: 36.05 KG/M2 | DIASTOLIC BLOOD PRESSURE: 68 MMHG | HEART RATE: 98 BPM

## 2024-02-06 DIAGNOSIS — M25.571 ACUTE RIGHT ANKLE PAIN: ICD-10-CM

## 2024-02-06 DIAGNOSIS — M10.9 ACUTE GOUT OF RIGHT ANKLE, UNSPECIFIED CAUSE: ICD-10-CM

## 2024-02-06 PROCEDURE — 99283 EMERGENCY DEPT VISIT LOW MDM: CPT

## 2024-02-06 PROCEDURE — 700102 HCHG RX REV CODE 250 W/ 637 OVERRIDE(OP): Mod: UD | Performed by: EMERGENCY MEDICINE

## 2024-02-06 PROCEDURE — A9270 NON-COVERED ITEM OR SERVICE: HCPCS | Mod: UD | Performed by: EMERGENCY MEDICINE

## 2024-02-06 PROCEDURE — 700111 HCHG RX REV CODE 636 W/ 250 OVERRIDE (IP): Mod: UD | Performed by: EMERGENCY MEDICINE

## 2024-02-06 RX ORDER — HYDROCODONE BITARTRATE AND ACETAMINOPHEN 5; 325 MG/1; MG/1
1 TABLET ORAL EVERY 4 HOURS PRN
Qty: 15 TABLET | Refills: 0 | Status: SHIPPED | OUTPATIENT
Start: 2024-02-06 | End: 2024-02-11

## 2024-02-06 RX ORDER — PREDNISONE 20 MG/1
60 TABLET ORAL DAILY
Status: DISCONTINUED | OUTPATIENT
Start: 2024-02-06 | End: 2024-02-06 | Stop reason: HOSPADM

## 2024-02-06 RX ORDER — PREDNISONE 20 MG/1
60 TABLET ORAL DAILY
Qty: 15 TABLET | Refills: 0 | Status: SHIPPED | OUTPATIENT
Start: 2024-02-07 | End: 2024-02-12

## 2024-02-06 RX ORDER — HYDROCODONE BITARTRATE AND ACETAMINOPHEN 5; 325 MG/1; MG/1
1 TABLET ORAL ONCE
Status: COMPLETED | OUTPATIENT
Start: 2024-02-06 | End: 2024-02-06

## 2024-02-06 RX ADMIN — PREDNISONE 60 MG: 20 TABLET ORAL at 17:50

## 2024-02-06 RX ADMIN — HYDROCODONE BITARTRATE AND ACETAMINOPHEN 1 TABLET: 5; 325 TABLET ORAL at 17:49

## 2024-02-06 ASSESSMENT — PAIN SCALES - WONG BAKER: WONGBAKER_NUMERICALRESPONSE: HURTS EVEN MORE

## 2024-02-06 ASSESSMENT — FIBROSIS 4 INDEX: FIB4 SCORE: 0.45

## 2024-02-06 ASSESSMENT — LIFESTYLE VARIABLES: DO YOU DRINK ALCOHOL: NO

## 2024-02-06 NOTE — ED TRIAGE NOTES
.  Chief Complaint   Patient presents with    Foot Pain     Right foot pain with swelling x 2 days      To triage by w/c with mother. Hx gout taking medications as prescribed. Mother reports pain x 2 days causing him not to walk and be tearful.

## 2024-02-07 NOTE — ED PROVIDER NOTES
ER Provider Note    Scribed for Serafin Flores M.D. by Daniella Rainey. 2/6/2024   5:26 PM    Primary Care Provider: Destiny Malave M.D.    CHIEF COMPLAINT  Chief Complaint   Patient presents with    Foot Pain     Right foot pain with swelling x 2 days      EXTERNAL RECORDS REVIEWED  Outpatient Notes: The patient was seen in office on 1/25/24 for a follow up of hyperthyroidism and type II diabetes.     HPI/ROS  LIMITATION TO HISTORY   Select: : None  OUTSIDE HISTORIAN(S):  None noted    Sander Romero is a 28 y.o. male who presents to the ED for evaluation of right ankle swelling and pain onset 2-3 days ago. The patient points to the lateral aspect of his ankle where his pain is. He notes he has a history of gout on the left ankle and that his symptom today feel the same on the right. He states he has been taking his Allopurinol and watching his diet but still has occasional gout flare ups.     PAST MEDICAL HISTORY  Past Medical History:   Diagnosis Date    CAP (community acquired pneumonia) 5/12/2017    CAP (community acquired pneumonia) 5/12/2017    Down syndrome     trisomy 21    Graves disease 1/16/2020    Hyperthyroidism 1/16/2020    Low HDL (under 40)     Obesity (BMI 30-39.9) 6/24/2022    Obesity (BMI 35.0-39.9 without comorbidity) ([facility]) 5/23/2017    Sepsis(995.91) 5/12/2017    Sleep apnea     work up done as a child.  no tx    Type 2 diabetes mellitus without complication, without long-term current use of insulin ([facility]) 10/22/2019    Undescended testicle     Vitamin D deficiency        SURGICAL HISTORY  Past Surgical History:   Procedure Laterality Date    MYRINGOTOMY      OTHER      surgery for undescended testies.    TESTICLE EXPLORATION      undescended testicle    TONSILLECTOMY      TONSILLECTOMY AND ADENOIDECTOMY         FAMILY HISTORY  Family History   Problem Relation Age of Onset    Hypertension Mother     Heart Disease Mother         congenital heart defect    Arthritis Mother     Diabetes  Father     Arthritis Maternal Grandmother     Stroke Maternal Grandfather     Diabetes Maternal Grandfather     Diabetes Paternal Grandmother     Cancer Paternal Grandfather         Prostate    Diabetes Paternal Grandfather        SOCIAL HISTORY   reports that he has never smoked. He has never used smokeless tobacco. He reports current alcohol use. He reports that he does not use drugs.    CURRENT MEDICATIONS  Previous Medications    ALBUTEROL 108 (90 BASE) MCG/ACT AERO SOLN INHALATION AEROSOL    Inhale 2 Puffs every 6 hours as needed for Shortness of Breath.    ALLOPURINOL (ZYLOPRIM) 100 MG TAB    Take 1 tablet by mouth once daily    BLOOD GLUCOSE MONITORING SUPPL DEVICE    Meter: Dispense Device of Insurance Preference. Sig. Use as directed for blood sugar monitoring. #1. NR.    BLOOD GLUCOSE TEST STRIPS    Test strips order:  for meter dispensed (insurance preference).  Testing one time per day E11.9    CHOLECALCIFEROL (VITAMIN D PO)    Take 4,000 Units by mouth every day.    COLCHICINE (COLCRYS) 0.6 MG TAB    Take 1 Tablet by mouth every day.    IPRATROPIUM-ALBUTEROL (DUONEB) 0.5-2.5 (3) MG/3ML NEBULIZER SOLUTION        LANCETS    Lancets order: Lancets for meter dispensed (insurance preference) testing one time per day E10.9    METFORMIN ER (GLUCOPHAGE XR) 500 MG TABLET SR 24 HR    Take 2 tablets twice daily    METHIMAZOLE (TAPAZOLE) 10 MG TAB    Take 1.5 Tablets by mouth every day.    PROPRANOLOL (INDERAL) 10 MG TAB    TAKE 1 TABLET BY MOUTH THREE TIMES DAILY   MUST keep APPT    THERAPEUTIC MULTIVITAMIN-MINERALS (THERAGRAN-M) TAB    Take 1 Tab by mouth every day.       ALLERGIES  No Known Allergies     PHYSICAL EXAM  /73   Pulse (!) 105   Temp 37.3 °C (99.2 °F) (Temporal)   Resp 16   Wt 95.3 kg (210 lb)   SpO2 94%   BMI 36.05 kg/m²    Nursing note and vitals reviewed.  Constitutional: Syndromic appearance trisomy 21. No distress.   HENT: Head is normocephalic and atraumatic. Oropharynx is clear and  moist without exudate or erythema.   Eyes: Pupils are equal, round, and reactive to light. Conjunctiva are normal.   Cardiovascular: Normal rate and regular rhythm. No murmur heard. Normal radial pulses.  Pulmonary/Chest: Breath sounds normal. No wheezes or rales.   Abdominal: Soft and non-tender. No distention    Musculoskeletal: Tenderness and mild redness over the lateral malleolus, no ankle effusion  Neurological: Awake, alert and oriented to person, place, and time. No focal deficits noted.  Skin: Skin is warm and dry. No rash.   Psychiatric: Normal mood and affect. Appropriate for clinical situation    INITIAL ASSESSMENT AND PLAN    5:26 PM - Patient was evaluated at bedside for gout-like symptoms. The patient has a history of gout and feels he is having a flare up today. He states he follows a diet specific to avoiding flare ups and is compliant with his gout medications. I informed him of the plan for discharge home with [facility] for pain and steroids. Patient verbalizes understanding and support with my plan of care.     ED Observation Status? No; Patient does not meet criteria for ED Observation.        DISPOSITION AND DISCUSSIONS    Escalation of care considered, and ultimately not performed: diagnostic imaging.    Decision tools and prescription drugs considered including, but not limited to: Medication modification: I do not see any indication for medication modification.    I reviewed prescription monitoring program for patient's narcotic use before prescribing a scheduled drug.The patient will not drink alcohol nor drive with prescribed medications. The patient will return for new or worsening symptoms and is stable at the time of discharge.    The patient is referred to a primary physician for blood pressure management, diabetic screening, and for all other preventative health concerns.    In prescribing controlled substances to this patient, I certify that I have obtained and reviewed the medical  history of Sander Romero. I have also made a good shravan effort to obtain applicable records from other providers who have treated the patient and records did not demonstrate any increased risk of substance abuse that would prevent me from prescribing controlled substances.     I have conducted a physical exam and documented it. I have reviewed Mr. Romero’s prescription history as maintained by the [place] Prescription Monitoring Program.     I have assessed the patient’s risk for abuse, dependency, and addiction using the validated Opioid Risk Tool available at https://www.mdcalc.com/ivsvfa-nzsl-wima-ort-narcotic-abuse.     Given the above, I believe the benefits of controlled substance therapy outweigh the risks. The reasons for prescribing controlled substances include non-narcotic, oral analgesic alternatives have been inadequate for pain control. Accordingly, I have discussed the risk and benefits, treatment plan, and alternative therapies with the patient.     DISPOSITION:  Patient will be discharged home in stable condition.    FOLLOW UP:  Destiny Malave M.D.  [address] [facility] [id number] [phone]    Schedule an appointment as soon as possible for a visit       [facility], Emergency Dept  [address] [facility] [address] [phone]    If symptoms worsen      OUTPATIENT MEDICATIONS:  New Prescriptions    HYDROCODONE-ACETAMINOPHEN (NORCO) 5-325 MG TAB PER TABLET    Take 1 Tablet by mouth every four hours as needed (pain) for up to 5 days.    PREDNISONE (DELTASONE) 20 MG TAB    Take 3 Tablets by mouth every day for 5 days.       FINAL DIAGNOSIS  1. Acute right ankle pain    2. Acute gout of right ankle, unspecified cause         Daniella BLANCAS), am scribing for, and in the presence of, Serafin Flores M.D..    Electronically signed by: Daniella Verdugo), 2/6/2024    Serafin BLANCAS M.D. personally performed the services described in this  documentation, as scribed by Daniella Rainey in my presence, and it is both accurate and complete.      The note accurately reflects work and decisions made by me.  Serafin Flores M.D.  2/6/2024  10:28 PM

## 2024-02-07 NOTE — ED NOTES
Discharge instructions reviewed with patients parent and signed. They were instructed on how to  prescriptions. Controlled substance form reviewed and signed by pts kaylah. They verbalized understanding of follow up instructions. All belongings with patient. They were assisted via wheelchair to lobby.

## 2024-02-07 NOTE — ED NOTES
Pt brought back to room via w/c for above. Pt transferred over to [facility] independently.   Agree with triage note. Chart up for ERP.

## 2024-02-21 ENCOUNTER — OFFICE VISIT (OUTPATIENT)
Dept: INTERNAL MEDICINE | Facility: OTHER | Age: 29
End: 2024-02-21
Payer: MEDICAID

## 2024-02-21 VITALS
SYSTOLIC BLOOD PRESSURE: 109 MMHG | BODY MASS INDEX: 36.16 KG/M2 | HEIGHT: 64 IN | HEART RATE: 82 BPM | OXYGEN SATURATION: 94 % | TEMPERATURE: 98.2 F | WEIGHT: 211.8 LBS | DIASTOLIC BLOOD PRESSURE: 72 MMHG

## 2024-02-21 DIAGNOSIS — Z23 NEED FOR VACCINATION: ICD-10-CM

## 2024-02-21 DIAGNOSIS — I47.9 TACHYCARDIA, PAROXYSMAL (HCC): ICD-10-CM

## 2024-02-21 DIAGNOSIS — E66.9 OBESITY (BMI 35.0-39.9 WITHOUT COMORBIDITY): ICD-10-CM

## 2024-02-21 DIAGNOSIS — Z00.00 HEALTHCARE MAINTENANCE: ICD-10-CM

## 2024-02-21 PROCEDURE — 3074F SYST BP LT 130 MM HG: CPT

## 2024-02-21 PROCEDURE — 90471 IMMUNIZATION ADMIN: CPT

## 2024-02-21 PROCEDURE — 3078F DIAST BP <80 MM HG: CPT

## 2024-02-21 PROCEDURE — 93000 ELECTROCARDIOGRAM COMPLETE: CPT

## 2024-02-21 PROCEDURE — 90686 IIV4 VACC NO PRSV 0.5 ML IM: CPT

## 2024-02-21 PROCEDURE — 99213 OFFICE O/P EST LOW 20 MIN: CPT | Mod: 25,GE

## 2024-02-21 ASSESSMENT — ENCOUNTER SYMPTOMS
BLOOD IN STOOL: 0
SEIZURES: 0
HEARTBURN: 0
FEVER: 0
NERVOUS/ANXIOUS: 0
COUGH: 0
ABDOMINAL PAIN: 0
DIZZINESS: 0
SHORTNESS OF BREATH: 0
NAUSEA: 0
DEPRESSION: 0
VOMITING: 0
WHEEZING: 0
LOSS OF CONSCIOUSNESS: 0
CHILLS: 0
FALLS: 0
HEADACHES: 0

## 2024-02-21 ASSESSMENT — PATIENT HEALTH QUESTIONNAIRE - PHQ9: CLINICAL INTERPRETATION OF PHQ2 SCORE: 0

## 2024-02-21 ASSESSMENT — FIBROSIS 4 INDEX: FIB4 SCORE: 0.45

## 2024-02-21 NOTE — PROGRESS NOTES
Established Patient    Patient Care Team:  Destiny Malave M.D. as PCP - General (Internal Medicine)    Sander Romero is a 28 y.o. male who presents today with the following Chief Complaint(s): Follow up for Diagnoses of Tachycardia, paroxysmal (HCC), Obesity (BMI 35.0-39.9 without comorbidity) (HCC), Need for vaccination, and Healthcare maintenance were pertinent to this visit.    HPI:  Patient is a 28-year-old with history of Down syndrome, Graves' disease, T2DM, gout.  Nonverbal at baseline accompanied by mother.  Mother states that patient's blood pressure has been running mildly low at home at 89-low 90s/60s at times especially at night.  Has also noted heart rate has been elevated.  Patient recently saw endocrinologist, with mother stating that Inderal was discontinued given low blood pressures.  Since discontinuation she states his resting heart rate is between 100s-110s.  She denies him having any symptoms, however is difficult to ascertain given patient is nonverbal.  Endocrinology is recommending her to follow-up with primary care clinic for cardiology referral.  No symptoms otherwise.  Mother also states that it has been difficult for him to lose weight as well.  He has been active on a treadmill 30 minutes at least 3 times a week however low intensity.  Has been watching diet as well.  Interested in nutrition services at this time.  Also interested in influenza vaccination.    Review of Systems   Constitutional:  Negative for chills and fever.   Respiratory:  Negative for cough, shortness of breath and wheezing.    Cardiovascular:  Negative for chest pain and leg swelling.   Gastrointestinal:  Negative for abdominal pain, blood in stool, heartburn, melena, nausea and vomiting.   Genitourinary:  Negative for dysuria and hematuria.   Musculoskeletal:  Negative for falls.   Neurological:  Negative for dizziness, seizures, loss of consciousness and headaches.   Psychiatric/Behavioral:   Negative for depression. The patient is not nervous/anxious.        Past Medical History:   Diagnosis Date    CAP (community acquired pneumonia) 5/12/2017    CAP (community acquired pneumonia) 5/12/2017    Down syndrome     trisomy 21    Graves disease 1/16/2020    Hyperthyroidism 1/16/2020    Low HDL (under 40)     Obesity (BMI 30-39.9) 6/24/2022    Obesity (BMI 35.0-39.9 without comorbidity) ([facility]) 5/23/2017    Sepsis(995.91) 5/12/2017    Sleep apnea     work up done as a child.  no tx    Type 2 diabetes mellitus without complication, without long-term current use of insulin ([facility]) 10/22/2019    Undescended testicle     Vitamin D deficiency      Social History     Tobacco Use    Smoking status: Never    Smokeless tobacco: Never   Vaping Use    Vaping Use: Never used   Substance Use Topics    Alcohol use: Yes     Alcohol/week: 0.0 oz     Comment: rarely    Drug use: No     Current Outpatient Medications   Medication Sig Dispense Refill    allopurinol (ZYLOPRIM) 100 MG Tab Take 1 tablet by mouth once daily 30 Tablet 3    colchicine (COLCRYS) 0.6 MG Tab Take 1 Tablet by mouth every day. 30 Tablet 2    methimazole (TAPAZOLE) 10 MG Tab Take 1.5 Tablets by mouth every day. 180 Tablet 2    metFORMIN ER (GLUCOPHAGE XR) 500 MG TABLET SR 24 HR Take 2 tablets twice daily 360 Tablet 3    Blood Glucose Monitoring Suppl Device Meter: Dispense Device of Insurance Preference. Sig. Use as directed for blood sugar monitoring. #1. NR. 1 Each 0    Blood Glucose Test Strips Test strips order:  for meter dispensed (insurance preference).  Testing one time per day E11.9 100 Strip 2    Lancets Lancets order: Lancets for meter dispensed (insurance preference) testing one time per day E10.9 100 Each 2    therapeutic multivitamin-minerals (THERAGRAN-M) Tab Take 1 Tab by mouth every day. 30 Tab 0    Cholecalciferol (VITAMIN D PO) Take 4,000 Units by mouth every day.       No current facility-administered medications for this visit.       BP  "109/72 (BP Location: Left arm, Patient Position: Sitting, BP Cuff Size: Adult)   Pulse 82   Temp 36.8 °C (98.2 °F) (Temporal)   Ht 1.626 m (5' 4\")   Wt 96.1 kg (211 lb 12.8 oz)   SpO2 94%   BMI 36.36 kg/m²   Physical Exam  Constitutional:       General: He is not in acute distress.     Appearance: He is obese.   HENT:      Head: Normocephalic and atraumatic.      Nose: Nose normal.      Mouth/Throat:      Mouth: Mucous membranes are moist.   Eyes:      Extraocular Movements: Extraocular movements intact.      Conjunctiva/sclera: Conjunctivae normal.   Cardiovascular:      Rate and Rhythm: Normal rate and regular rhythm.      Heart sounds: No murmur heard.     No friction rub. No gallop.   Pulmonary:      Breath sounds: Normal breath sounds. No wheezing, rhonchi or rales.   Abdominal:      General: Abdomen is flat. Bowel sounds are normal.      Palpations: Abdomen is soft.      Tenderness: There is no abdominal tenderness. There is no guarding or rebound.      Hernia: A hernia (Umbilical hernia, reducible without erythema) is present.   Musculoskeletal:      Right lower leg: No edema.      Left lower leg: No edema.   Skin:     General: Skin is warm.      Capillary Refill: Capillary refill takes less than 2 seconds.   Neurological:      General: No focal deficit present.      Mental Status: He is alert.         Assessment and Plan:     Tachycardia, paroxysmal (HCC)  Noted primarily at home with mother stating HR has been 100s-110s, typically at night.  Also with associated softer BP at times, typically running between 89-low 90s/60s.  Asymptomatic per mother's history.  BP and HR however this visit was 109/72 and 82 respectively.  Per mother, endocrinology was recommending cardiology referral, presumably for Holter monitor.  EKG in clinic performed as well as 2-minute V2 lead strip, both unremarkable with normal regular rate and rhythm and without ST or T wave abnormalities.    -Advised mother that EKG and " 2-minute strip were largely unrevealing, and patient will likely not need cardiology referral.  However referral still placed in case mother wants second opinion  -Strongly encourage patient to use 1 consistent BP monitor, as she has been cycling between 3 different monitors.  BP record sheet provided for patient's mother to log values and bring with her next visit  -If readings at home show low BP and high heart rate, with patient developing persistent symptoms including fever/chills, chest pain, SOB, mother was advised to take patient to ER    Obesity (BMI 35.0-39.9 without comorbidity) (HCC)  History of obesity with BMI of 36.36 this visit weight at 211 pounds.  Has been physically active with low intensity exercise 3 times a week for 30 minutes (with treadmill), and attempting to be balanced diet.    -Counseled extensively on lifestyle modifications with appropriate handouts provided  -Referred to nutrition services    Healthcare maintenance  -Provided influenza vaccination this visit      Orders Placed This Encounter    INFLUENZA VACCINE QUAD INJ (PF)    REFERRAL TO CARDIOLOGY    Referral to Nutrition Services    EKG - Clinic Performed       Return in about 4 weeks (around 3/20/2024) for for reestablsihment of care with male doctor .    Talha Chaudhari D.O. PGY II  Internal Medicine  [employer]

## 2024-02-21 NOTE — ASSESSMENT & PLAN NOTE
History of obesity with BMI of 36.36 this visit weight at 211 pounds.  Has been physically active with low intensity exercise 3 times a week for 30 minutes (with treadmill), and attempting to be balanced diet.    -Counseled extensively on lifestyle modifications with appropriate handouts provided  -Referred to nutrition services

## 2024-02-21 NOTE — ASSESSMENT & PLAN NOTE
Noted primarily at home with mother stating HR has been 100s-110s, typically at night.  Also with associated softer BP at times, typically running between 89-low 90s/60s.  Asymptomatic per mother's history.  BP and HR however this visit was 109/72 and 82 respectively.  Per mother, endocrinology was recommending cardiology referral, presumably for Holter monitor.  EKG in clinic performed as well as 2-minute V2 lead strip, both unremarkable with normal regular rate and rhythm and without ST or T wave abnormalities.    -Advised mother that EKG and 2-minute strip were largely unrevealing, and patient will likely not need cardiology referral.  However referral still placed in case mother wants second opinion  -Strongly encourage patient to use 1 consistent BP monitor, as she has been cycling between 3 different monitors.  BP record sheet provided for patient's mother to log values and bring with her next visit  -If readings at home show low BP and high heart rate, with patient developing persistent symptoms including fever/chills, chest pain, SOB, mother was advised to take patient to ER

## 2024-03-07 ENCOUNTER — TELEPHONE (OUTPATIENT)
Dept: HEALTH INFORMATION MANAGEMENT | Facility: OTHER | Age: 29
End: 2024-03-07
Payer: MEDICAID

## 2024-03-08 ENCOUNTER — TELEPHONE (OUTPATIENT)
Dept: CARDIOLOGY | Facility: MEDICAL CENTER | Age: 29
End: 2024-03-08
Payer: MEDICAID

## 2024-03-08 NOTE — TELEPHONE ENCOUNTER
Called Pt and inform Pt about undone Labs that needed to be done before Pt next appt on 3.15.2024 with LORY Pt will take care of it on Saturday

## 2024-03-15 ENCOUNTER — OFFICE VISIT (OUTPATIENT)
Dept: CARDIOLOGY | Facility: MEDICAL CENTER | Age: 29
End: 2024-03-15
Payer: MEDICAID

## 2024-03-15 VITALS
WEIGHT: 208 LBS | HEART RATE: 75 BPM | HEIGHT: 64 IN | SYSTOLIC BLOOD PRESSURE: 110 MMHG | BODY MASS INDEX: 35.51 KG/M2 | RESPIRATION RATE: 16 BRPM | DIASTOLIC BLOOD PRESSURE: 60 MMHG | OXYGEN SATURATION: 95 %

## 2024-03-15 DIAGNOSIS — I47.9 TACHYCARDIA, PAROXYSMAL (HCC): ICD-10-CM

## 2024-03-15 DIAGNOSIS — R06.02 SOB (SHORTNESS OF BREATH): ICD-10-CM

## 2024-03-15 PROCEDURE — 3078F DIAST BP <80 MM HG: CPT | Performed by: INTERNAL MEDICINE

## 2024-03-15 PROCEDURE — 99211 OFF/OP EST MAY X REQ PHY/QHP: CPT | Performed by: INTERNAL MEDICINE

## 2024-03-15 PROCEDURE — 3074F SYST BP LT 130 MM HG: CPT | Performed by: INTERNAL MEDICINE

## 2024-03-15 PROCEDURE — 99204 OFFICE O/P NEW MOD 45 MIN: CPT | Performed by: INTERNAL MEDICINE

## 2024-03-15 PROCEDURE — 99212 OFFICE O/P EST SF 10 MIN: CPT | Performed by: INTERNAL MEDICINE

## 2024-03-15 ASSESSMENT — FIBROSIS 4 INDEX: FIB4 SCORE: 0.45

## 2024-03-15 NOTE — PROGRESS NOTES
Chief Complaint   Patient presents with    Tachycardia    Chest Pain       Subjective     Sander Jesse Romero is a 28 y.o. male who presents today as a consult from Talha Duckworth for tachycardia.     Thank you for allowing me to evaluate Mr. Romero, who as you know is a 28 year old male with Downs disease, mother with subaortic valve stenosis surgically removed by Juancarlos Duong. He complains of palpitations and shortness of breath per his parents. He was previously seen in our office by Nikhil Flores on 07/07/17 and underwent evaluation for this with results as described below.     Past Medical History:   Diagnosis Date    CAP (community acquired pneumonia) 5/12/2017    CAP (community acquired pneumonia) 5/12/2017    Down syndrome     trisomy 21    Graves disease 1/16/2020    Hyperthyroidism 1/16/2020    Low HDL (under 40)     Obesity (BMI 30-39.9) 6/24/2022    Obesity (BMI 35.0-39.9 without comorbidity) ([facility]) 5/23/2017    Sepsis(995.91) 5/12/2017    Sleep apnea     work up done as a child.  no tx    Type 2 diabetes mellitus without complication, without long-term current use of insulin ([facility]) 10/22/2019    Undescended testicle     Vitamin D deficiency      Past Surgical History:   Procedure Laterality Date    MYRINGOTOMY      OTHER      surgery for undescended testies.    TESTICLE EXPLORATION      undescended testicle    TONSILLECTOMY      TONSILLECTOMY AND ADENOIDECTOMY       Family History   Problem Relation Age of Onset    Hypertension Mother     Heart Disease Mother         congenital heart defect    Arthritis Mother     Diabetes Father     Arthritis Maternal Grandmother     Stroke Maternal Grandfather     Diabetes Maternal Grandfather     Diabetes Paternal Grandmother     Cancer Paternal Grandfather         Prostate    Diabetes Paternal Grandfather      Social History     Socioeconomic History    Marital status: Single     Spouse name: Not on file    Number of children: Not on file    Years of  education: 5    Highest education level: Not on file   Occupational History    Not on file   Tobacco Use    Smoking status: Never    Smokeless tobacco: Never   Vaping Use    Vaping Use: Never used   Substance and Sexual Activity    Alcohol use: Yes     Alcohol/week: 0.0 oz     Comment: rarely    Drug use: No    Sexual activity: Never   Other Topics Concern    Not on file   Social History Narrative    One brother in the home with mom and dad.     Social Determinants of Health     Financial Resource Strain: Not on file   Food Insecurity: Not on file   Transportation Needs: Not on file   Physical Activity: Not on file   Stress: Not on file   Social Connections: Not on file   Intimate Partner Violence: Not on file   Housing Stability: Not on file     No Known Allergies    (Medications reviewed.)  Outpatient Encounter Medications as of 3/15/2024   Medication Sig Dispense Refill    allopurinol (ZYLOPRIM) 100 MG Tab Take 1 tablet by mouth once daily 30 Tablet 3    colchicine (COLCRYS) 0.6 MG Tab Take 1 Tablet by mouth every day. 30 Tablet 2    methimazole (TAPAZOLE) 10 MG Tab Take 1.5 Tablets by mouth every day. 180 Tablet 2    metFORMIN ER (GLUCOPHAGE XR) 500 MG TABLET SR 24 HR Take 2 tablets twice daily 360 Tablet 3    Blood Glucose Monitoring Suppl Device Meter: Dispense Device of Insurance Preference. Sig. Use as directed for blood sugar monitoring. #1. NR. 1 Each 0    Blood Glucose Test Strips Test strips order:  for meter dispensed (insurance preference).  Testing one time per day E11.9 100 Strip 2    Lancets Lancets order: Lancets for meter dispensed (insurance preference) testing one time per day E10.9 100 Each 2    therapeutic multivitamin-minerals (THERAGRAN-M) Tab Take 1 Tab by mouth every day. 30 Tab 0    Cholecalciferol (VITAMIN D PO) Take 4,000 Units by mouth every day.       No facility-administered encounter medications on file as of 3/15/2024.     Review of Systems   Unable to perform ROS: Patient  "nonverbal              Objective     /60 (BP Location: Left arm, Patient Position: Sitting, BP Cuff Size: Adult)   Pulse 75   Resp 16   Ht 1.626 m (5' 4\")   Wt 94.3 kg (208 lb)   SpO2 95%   BMI 35.70 kg/m²     Physical Exam  Constitutional:       Appearance: Normal appearance. He is well-developed and normal weight.   HENT:      Head: Normocephalic and atraumatic.   Neck:      Vascular: No JVD.   Cardiovascular:      Rate and Rhythm: Normal rate and regular rhythm.      Heart sounds: Normal heart sounds.   Pulmonary:      Effort: Pulmonary effort is normal.      Breath sounds: Normal breath sounds.   Abdominal:      General: Bowel sounds are normal.      Palpations: Abdomen is soft.      Comments: No hepatosplenomegaly.   Musculoskeletal:         General: Normal range of motion.   Lymphadenopathy:      Cervical: No cervical adenopathy.   Skin:     General: Skin is warm and dry.   Neurological:      Mental Status: He is alert and oriented to person, place, and time.            CARDIAC STUDIES/PROCEDURES:    ECHOCARDIOGRAM CONCLUSIONS at [facility] (06/10/22)  Echocardiogram showing ejection fraction >60%.  Normal gradient across the aortic valve.  No evidence of LVOT gradient.  (study result reviewed)     EKG performed on (02/21/23) was reviewed: EKG personally interpreted shows sinus rhythm.     HOLTER MONITOR (02/09/16-02/29/16)  Sinuts tachycardia, sinus thythm.  (study result reviewed)     MYOCARDIAL PERFUSION STUDY CONCLUSIONS (12/24/15)  No reversible defects that would indicate ischemia.   (study result reviewed)     Assessment & Plan     1. Tachycardia, paroxysmal (HCC)        2. SOB (shortness of breath)            Medical Decision Making: Today's Assessment/Status/Plan:        Palpitations: He is apparently experiencing palpitations as described above. We will perform a Zio patch monitor and echocardiogram and follow up with him.   Shortness of breath: He is apparently  experiencing " shortness of breath. He has undergone echocardiogram which did not demonstrate LVOT gradient. We will repeat an echocardiogram as requested by his parents.     We will follow up in 3 months.    Thank you for this consult.

## 2024-03-19 ENCOUNTER — APPOINTMENT (OUTPATIENT)
Dept: CARDIOLOGY | Facility: MEDICAL CENTER | Age: 29
End: 2024-03-19
Attending: INTERNAL MEDICINE
Payer: MEDICAID

## 2024-03-21 ENCOUNTER — OFFICE VISIT (OUTPATIENT)
Dept: INTERNAL MEDICINE | Facility: OTHER | Age: 29
End: 2024-03-21
Payer: MEDICAID

## 2024-03-21 VITALS
TEMPERATURE: 98.4 F | HEART RATE: 82 BPM | WEIGHT: 207.6 LBS | BODY MASS INDEX: 35.44 KG/M2 | OXYGEN SATURATION: 96 % | DIASTOLIC BLOOD PRESSURE: 66 MMHG | HEIGHT: 64 IN | SYSTOLIC BLOOD PRESSURE: 112 MMHG

## 2024-03-21 DIAGNOSIS — Z13.9 SCREENING DUE: ICD-10-CM

## 2024-03-21 DIAGNOSIS — H61.22 LEFT EAR IMPACTED CERUMEN: ICD-10-CM

## 2024-03-21 DIAGNOSIS — E05.90 HYPERTHYROIDISM: ICD-10-CM

## 2024-03-21 DIAGNOSIS — E11.9 TYPE 2 DIABETES MELLITUS WITHOUT COMPLICATION, WITHOUT LONG-TERM CURRENT USE OF INSULIN (HCC): ICD-10-CM

## 2024-03-21 DIAGNOSIS — L73.2 HIDRADENITIS AXILLARIS: ICD-10-CM

## 2024-03-21 RX ORDER — DOXYCYCLINE HYCLATE 100 MG
100 TABLET ORAL DAILY
Qty: 90 TABLET | Refills: 0 | Status: SHIPPED | OUTPATIENT
Start: 2024-03-21 | End: 2024-06-19

## 2024-03-21 ASSESSMENT — FIBROSIS 4 INDEX: FIB4 SCORE: 0.45

## 2024-03-21 NOTE — PROGRESS NOTES
Established Patient    Patient Care Team:  Neal Mendez M.D. as PCP - General (Internal Medicine)    Sander Romero is a 28 y.o. male who presents today with the following Chief Complaint(s): Follow up for Diagnoses of Screening due, Hidradenitis axillaris, Left ear impacted cerumen, Hyperthyroidism, and Type 2 diabetes mellitus without complication, without long-term current use of insulin (HCC) were pertinent to this visit.    HPI:    Mr. Boucher is a 28-year-old male who came to the clinic to reestablish care, the patient was with Dr. Malave before.  The patient has a previous medical history of diabetes type 2, hypothyroidism.  The patient came accompanied by his mother, who was the one provided information on behalf because the patient does not verbalize too much.     The patient's mother states that  is following with a cardiologist and he is a scheduled to have a Zio patch and echocardiogram in the future for evaluation of chest discomfort and some palpitations that the patient has been complaining.    The patient is also following with the endocrinologist due to hypothyroidism, taking methimazole, according to the patient mother are still feeling out the correct dose for this and following with another appointment with the endocrinologist, patient is still pending some lab work.    The patient and mother also mention to the patient elevation in the left ear has decreased, and they noticed after he was he using his earphones.  Physical evaluation of the left ear was positive for ear plaque with cerumen.    The patient mother also mentioned that the patient has been having axillary lesions, that are presenting exudation, states that it happened this time over time.         ROS:     General: No fevers, chills, night sweats, weight loss or gain  HEENT: No hearing changes, vision changes, eye pain, ear pain, nasal discharge, sore throat  Neck: No swelling in neck  Pulmonary: No shortness of  breath, cough, sputum, or hemoptysis  Cardiovascular: No chest pain, palpitations, or LE swelling  GI: No nausea, vomiting, diarrhea, constipation, abdominal pain, hematochezia or melena  : No dysuria or frequency  Neuro: No focal weakness, no general weakness, no headaches, no lightheadedness, no dizziness  Psych: Patient with Down syndrome    Past Medical History:   Diagnosis Date    CAP (community acquired pneumonia) 5/12/2017    CAP (community acquired pneumonia) 5/12/2017    Down syndrome     trisomy 21    Graves disease 1/16/2020    Hyperthyroidism 1/16/2020    Low HDL (under 40)     Obesity (BMI 30-39.9) 6/24/2022    Obesity (BMI 35.0-39.9 without comorbidity) ([facility]) 5/23/2017    Sepsis(995.91) 5/12/2017    Sleep apnea     work up done as a child.  no tx    Type 2 diabetes mellitus without complication, without long-term current use of insulin ([facility]) 10/22/2019    Undescended testicle     Vitamin D deficiency      Social History     Tobacco Use    Smoking status: Never    Smokeless tobacco: Never   Vaping Use    Vaping Use: Never used   Substance Use Topics    Alcohol use: Yes     Alcohol/week: 0.0 oz     Comment: rarely    Drug use: No     Current Outpatient Medications   Medication Sig Dispense Refill    doxycycline (VIBRAMYCIN) 100 MG Tab Take 1 Tablet by mouth every day for 90 days. 90 Tablet 0    colchicine (COLCRYS) 0.6 MG Tab Take 1 Tablet by mouth every day. 30 Tablet 2    methimazole (TAPAZOLE) 10 MG Tab Take 1.5 Tablets by mouth every day. 180 Tablet 2    metFORMIN ER (GLUCOPHAGE XR) 500 MG TABLET SR 24 HR Take 2 tablets twice daily 360 Tablet 3    Blood Glucose Monitoring Suppl Device Meter: Dispense Device of Insurance Preference. Sig. Use as directed for blood sugar monitoring. #1. NR. 1 Each 0    Blood Glucose Test Strips Test strips order:  for meter dispensed (insurance preference).  Testing one time per day E11.9 100 Strip 2    Lancets Lancets order: Lancets for meter dispensed (insurance  "preference) testing one time per day E10.9 100 Each 2    therapeutic multivitamin-minerals (THERAGRAN-M) Tab Take 1 Tab by mouth every day. 30 Tab 0    Cholecalciferol (VITAMIN D PO) Take 4,000 Units by mouth every day.      allopurinol (ZYLOPRIM) 100 MG Tab Take 1 tablet by mouth once daily (Patient not taking: Reported on 3/21/2024) 30 Tablet 3     No current facility-administered medications for this visit.       Physical Exam:  /66 (BP Location: Left arm, Patient Position: Sitting, BP Cuff Size: Adult)   Pulse 82   Temp 36.9 °C (98.4 °F) (Temporal)   Ht 1.63 m (5' 4.17\")   Wt 94.2 kg (207 lb 9.6 oz)   SpO2 96%   BMI 35.44 kg/m²   General: Well developed, well nourished male, in no distress.  HEENT: NC/AT, PERRL, EOMI, no scleral icterus or conjunctival pallor, fair dentition/denture in, no nasal discharge or oral erythema or exudates.  Patient presents left ear cerumen plug  Neck: Supple, No cervical or supraclavicular LAD  CV:RRR, no murmurs gallops or Rubs, no JVD  Pulm: LCAB, no crackles, rales, rhonchi, or wheezing  GI: Normal bowel sounds, abdomen soft, nontender, nondistended to deep or light palpation in all 4 quadrants, no HSM.  MSK: Radial and dorsalis pedis pulses 2+ and equal bilaterally, respectively.  Strength 5 out of 5 in upper and lower extremities.  No lower extremity edema  Patient both axilla presents with hidradenitis, exudate present.   Neuro: Patient is alert and oriented x3, no focal deficits  Psych: Appropriate mood and affect       Assessment and Plan:     Mr. and is a 28-year-old male who follow-up with cardiology having had a Zio patch and echocardiogram, and will follow-up with endocrinologist for hyperthyroidism in the future.    1. Screening due  It was mentioned to the patient and his mother that it is necessary to have HIV screening every year for which she agreed and she also agreed to have the pneumococcal conjugate secondary to diabetes type 2.  - HIV AG/AB COMBO " ASSAY SCREENING; Future  - Pneumococcal Conjugate Vaccine 20-Valent (6 wks+)    2. Hidradenitis axillaris  The patient has been presenting axillary lesions for the last months, does present exudation , will start with antibiotic once per day and referral reports rheumatology.  - Referral to Dermatology  - doxycycline (VIBRAMYCIN) 100 MG Tab; Take 1 Tablet by mouth every day for 90 days.  Dispense: 90 Tablet; Refill: 0    3. Left ear impacted cerumen  The patient audition on the left ear has decreased, physical examination revealed an left ear plaque of cerumen  - Ear Cerumen Removal by MA    4. Hyperthyroidism   The patient has hypothyroidism and is following with the endocrinologist pending labs sent by the specialist, on methimazole at this moment.  -Continue methimazole 10 mg daily  -Follow-up with endocrinologist    5.  Diabetes type 2  Patient last A1c at 6.1, on metformin 1000 mg p.o. twice daily, patient also following with endocrinologist  -Continue metformin 1000 mg PO BID    Return in about 6 weeks (around 5/2/2024).    There are no Patient Instructions on file for this visit.    Neal Mendez M.D. PGY   [facility]    This note was created using voice recognition software.  While every attempt is made to ensure accuracy of transcription, occasionally errors occur.

## 2024-04-04 DIAGNOSIS — E11.9 TYPE 2 DIABETES MELLITUS WITHOUT COMPLICATION, WITHOUT LONG-TERM CURRENT USE OF INSULIN (HCC): ICD-10-CM

## 2024-04-04 RX ORDER — METFORMIN HYDROCHLORIDE 500 MG/1
TABLET, EXTENDED RELEASE ORAL
Qty: 360 TABLET | Refills: 3 | Status: SHIPPED | OUTPATIENT
Start: 2024-04-04

## 2024-04-30 ENCOUNTER — OFFICE VISIT (OUTPATIENT)
Dept: ENDOCRINOLOGY | Facility: MEDICAL CENTER | Age: 29
End: 2024-04-30
Payer: MEDICAID

## 2024-04-30 VITALS
OXYGEN SATURATION: 96 % | SYSTOLIC BLOOD PRESSURE: 122 MMHG | HEART RATE: 110 BPM | HEIGHT: 64 IN | RESPIRATION RATE: 16 BRPM | BODY MASS INDEX: 35.34 KG/M2 | WEIGHT: 207 LBS | DIASTOLIC BLOOD PRESSURE: 66 MMHG

## 2024-04-30 DIAGNOSIS — Z79.899 HIGH RISK MEDICATION USE: ICD-10-CM

## 2024-04-30 DIAGNOSIS — E11.9 TYPE 2 DIABETES MELLITUS WITHOUT COMPLICATION, WITHOUT LONG-TERM CURRENT USE OF INSULIN (HCC): ICD-10-CM

## 2024-04-30 DIAGNOSIS — R79.89 ELEVATED LIVER FUNCTION TESTS: ICD-10-CM

## 2024-04-30 DIAGNOSIS — E55.9 VITAMIN D INSUFFICIENCY: ICD-10-CM

## 2024-04-30 DIAGNOSIS — E05.90 HYPERTHYROIDISM: ICD-10-CM

## 2024-04-30 DIAGNOSIS — E78.1 HYPERTRIGLYCERIDEMIA: ICD-10-CM

## 2024-04-30 DIAGNOSIS — E05.00 GRAVES DISEASE: ICD-10-CM

## 2024-04-30 LAB
25(OH)D3+25(OH)D2 SERPL-MCNC: 37 NG/ML (ref 30–100)
ALBUMIN SERPL-MCNC: 4.1 G/DL (ref 4.3–5.2)
ALBUMIN/CREAT UR: 4 MG/G CREAT (ref 0–29)
ALBUMIN/GLOB SERPL: 1 {RATIO} (ref 1.2–2.2)
ALP SERPL-CCNC: 125 IU/L (ref 44–121)
ALT SERPL-CCNC: 98 IU/L (ref 0–44)
AST SERPL-CCNC: 60 IU/L (ref 0–40)
BILIRUB SERPL-MCNC: 0.6 MG/DL (ref 0–1.2)
BUN SERPL-MCNC: 11 MG/DL (ref 6–20)
BUN/CREAT SERPL: 12 (ref 9–20)
CALCIUM SERPL-MCNC: 9.4 MG/DL (ref 8.7–10.2)
CHLORIDE SERPL-SCNC: 100 MMOL/L (ref 96–106)
CHOLEST SERPL-MCNC: 169 MG/DL (ref 100–199)
CO2 SERPL-SCNC: 20 MMOL/L (ref 20–29)
CREAT SERPL-MCNC: 0.89 MG/DL (ref 0.76–1.27)
CREAT UR-MCNC: 253.2 MG/DL
EGFRCR SERPLBLD CKD-EPI 2021: 120 ML/MIN/1.73
GLOBULIN SER CALC-MCNC: 4 G/DL (ref 1.5–4.5)
GLUCOSE SERPL-MCNC: 129 MG/DL (ref 70–99)
HBA1C MFR BLD: 6.1 % (ref ?–5.8)
HDLC SERPL-MCNC: 36 MG/DL
LABORATORY COMMENT REPORT: ABNORMAL
LDLC SERPL CALC-MCNC: 100 MG/DL (ref 0–99)
MICROALBUMIN UR-MCNC: 9.4 UG/ML
POCT INT CON NEG: NEGATIVE
POCT INT CON POS: POSITIVE
POTASSIUM SERPL-SCNC: 4.5 MMOL/L (ref 3.5–5.2)
PROT SERPL-MCNC: 8.1 G/DL (ref 6–8.5)
SODIUM SERPL-SCNC: 140 MMOL/L (ref 134–144)
T3FREE SERPL-MCNC: 3.2 PG/ML (ref 2–4.4)
T4 FREE SERPL-MCNC: 1.18 NG/DL (ref 0.82–1.77)
TRIGL SERPL-MCNC: 187 MG/DL (ref 0–149)
TSH SERPL DL<=0.005 MIU/L-ACNC: 1.28 UIU/ML (ref 0.45–4.5)
VLDLC SERPL CALC-MCNC: 33 MG/DL (ref 5–40)

## 2024-04-30 PROCEDURE — 99213 OFFICE O/P EST LOW 20 MIN: CPT

## 2024-04-30 PROCEDURE — 83036 HEMOGLOBIN GLYCOSYLATED A1C: CPT

## 2024-04-30 ASSESSMENT — FIBROSIS 4 INDEX: FIB4 SCORE: 0.45

## 2024-04-30 NOTE — PROGRESS NOTES
"RN-CDE Note    Subjective:   Endocrinology Clinic Progress Note  PCP: Neal Mendez M.D.    HPI:  Sander Romero is a 28 y.o. old patient who is seen today by the Diabetes Nurse Specialist for review of his controlled type 2 diabetes.    Recent changes in health: denies any changes.   DM:   Last A1c:   Lab Results   Component Value Date/Time    HBA1C 6.1 (A) 04/30/2024 02:55 PM      Previous A1c was 6.1 on 1/22/24  A1C GOAL: < 7    Diabetes Medications:   Metformin 1000 mg bid.       Exercise: activity has decreased, pain due to gout.    Diet: \"healthy\" diet  in general  Patient's body mass index is 35.42 kg/m². Exercise and nutrition counseling were performed at this visit.    Glucose monitoring frequency: daily    Hypoglycemic episodes: na  Last Retinal Exam: on file and up-to-date    Foot Exam:  Monofilament: current   Lab Results   Component Value Date/Time    MALBCRT see below 02/11/2021 09:31 AM    MICROALBUR <1.2 02/11/2021 09:31 AM    MICRALB 9.4 04/26/2024 11:50 AM        ACR Albumin/Creatinine Ratio goal <30     HTN:   Blood pressure goal <130/<80 .   Currently Rx ACE/ARB: Not Indicated     Dyslipidemia:    Lab Results   Component Value Date/Time    CHOLSTRLTOT 169 04/26/2024 11:50 AM    CHOLSTRLTOT 123 02/11/2021 09:31 AM    LDL 63 02/11/2021 09:31 AM    HDL 36 (L) 04/26/2024 11:50 AM    HDL 34 (A) 02/11/2021 09:31 AM    TRIGLYCERIDE 187 (H) 04/26/2024 11:50 AM    TRIGLYCERIDE 132 02/11/2021 09:31 AM         Currently Rx Statin: No     He  reports that he has never smoked. He has never used smokeless tobacco.      Plan:     Discussed and educated on:   - All medications, side effects and compliance (discussed carefully)  - HbA1C: target  - Home glucose monitoring emphasized  - Weight control and daily exercise    Recommended medication changes: no changes    "

## 2024-04-30 NOTE — PROGRESS NOTES
Chief Complaint: Follow up for Hyperthyroidism secondary to Grave's disease and type 2 diabetes and history of Down syndrome.     HPI:     Sander Romero is a 27 y.o. male here for follow up of of the above medical issues.   He was previously a patient of KATHERINE Brush.    Hyperthyroidism  He has Hyperthyroidism first diagnosed in April 2019 with a positive thyroid uptake and scan from May 2019 showing increased 5-hour uptake of 65% c/w Graves' disease.     Previously his hyperthyroidism was difficult to control due to noncompliance with methimazole therapy.      Patient has Down syndrome and he does not like taking medications.    His mother also originally did not understand why the patient had to take methimazole.    Denies: anxiety, tremors, palpitations    Currently he is taking methimazole 10 mg daily  He reports better compliance   Latest Reference Range & Units 04/26/24 11:50   TSH 0.450 - 4.500 uIU/mL 1.280   Free T-4 0.82 - 1.77 ng/dL 1.18   T3,Free 2.0 - 4.4 pg/mL 3.2     2. Diabetes  Type 2  Patient also has type 2 diabetes initially discovered in Aug 2018.    His A1c was 6.1% on 1/22/24    Current Medications:  He is on Metformin  mg 2 pills  twice a day.  He denies Se with his metformin   He is trying to follow low carb - his mom is watching his diet and portions control  Activity: slowly working on it    3. Hypertriglyceridemia  But note TSH was high which was high when this was done   Latest Reference Range & Units 04/26/24 11:50   Cholesterol,Tot 100 - 199 mg/dL 169   Triglycerides 0 - 149 mg/dL 187 (H)   HDL >39 mg/dL 36 (L)   LDL Chol Calc (NIH) 0 - 99 mg/dL 100 (H)   VLDL Cholesterol Calc 5 - 40 mg/dL 33     He does not have diabetic kidney disease   Latest Reference Range & Units 04/26/24 11:50   Creatinine, Random Urine Not Estab. mg/dL 253.2   Microalbumin, Urine Random Not Estab. ug/mL 9.4   Microalbumin-Creatinine 0 - 29 mg/g creat 4      Latest Reference Range & Units  04/26/24 11:50   eGFR >59 mL/min/1.73 120     Eye exam was on done today on 03/27/24      4. Vitamin D deficiency  Currently taking Vitamin D 3 5000 IU daily   Latest Reference Range & Units 04/26/24 11:50   25-Hydroxy   Vitamin D 25 30.0 - 100.0 ng/mL 37.0     5. Elevated liver function test   Latest Reference Range & Units 04/26/24 11:50   AST(SGOT) 0 - 40 IU/L 60 (H)   ALT(SGPT) 0 - 44 IU/L 98 (H)   Alkaline Phosphatase 44 - 121 IU/L 125 (H)       Patient's medications, allergies, and social histories were reviewed and updated as appropriate.      ROS:     CONS:     No fever, no chills   EYES:     No diplopia, no blurry vision   CV:           No chest pain, no palpitations   PULM:     No SOB, no cough, no hemoptysis.   GI:            No nausea, no vomiting, no diarrhea, no constipation   ENDO:     No polyuria, no polydipsia, no heat intolerance, no cold intolerance       Past Medical History:  Problem List:  2024-03: SOB (shortness of breath)  2024-02: Tachycardia, paroxysmal ([facility])  2024-02: Healthcare maintenance  2023-10: Umbilical hernia without obstruction and without gangrene  2023-10: Hypertriglyceridemia  2023-09: Chronic idiopathic gout of left foot  2022-07: High risk medication use  2022-06: Obesity (BMI 30-39.9)  2020-01: Hyperthyroidism  2020-01: Graves disease  2019-10: Type 2 diabetes mellitus without complication, without long-  term current use of insulin ([facility])  2017-05: Obesity (BMI 35.0-39.9 without comorbidity) ([facility])  2017-05: Sepsis(995.91)  2017-05: CAP (community acquired pneumonia)  Down syndrome  Low HDL (under 40)  Vitamin D deficiency  Impaired fasting glucose  Sleep apnea  Undescended testicle      Past Surgical History:  Past Surgical History:   Procedure Laterality Date    MYRINGOTOMY      OTHER      surgery for undescended testies.    TESTICLE EXPLORATION      undescended testicle    TONSILLECTOMY      TONSILLECTOMY AND ADENOIDECTOMY          Allergies:  Patient has no known  "allergies.     Social History:  Social History     Tobacco Use    Smoking status: Never    Smokeless tobacco: Never   Vaping Use    Vaping Use: Never used   Substance Use Topics    Alcohol use: Yes     Alcohol/week: 0.0 oz     Comment: rarely    Drug use: No        Family History:   family history includes Arthritis in his maternal grandmother and mother; Cancer in his paternal grandfather; Diabetes in his father, maternal grandfather, paternal grandfather, and paternal grandmother; Heart Disease in his mother; Hypertension in his mother; Stroke in his maternal grandfather.      PHYSICAL EXAM:   Vital signs: /66 (BP Location: Left arm, Patient Position: Sitting)   Pulse (!) 110   Resp 16   Ht 1.628 m (5' 4.1\")   Wt 93.9 kg (207 lb)   SpO2 96%   BMI 35.42 kg/m²   GENERAL: Obese young gentleman with Down syndrome in no apparent distress.   EYE:  No ocular asymmetry, PERRLA, No exophthalmos or lid lag  HENT: Pink, moist mucous membranes.    NECK: No thyromegaly.   CARDIOVASCULAR:  No murmurs  LUNGS: Clear breath sounds  ABDOMEN: Soft, nontender   EXTREMITIES: No clubbing, cyanosis, or edema.   NEUROLOGICAL: No gross focal motor abnormalities, there is visible tremors with both hands  LYMPH: No cervical adenopathy palpated.   SKIN: No rashes, lesions.       Labs:  Lab Results   Component Value Date/Time    WBC 5.7 12/06/2023 04:13 AM    WBC 7.0 02/11/2021 09:31 AM    RBC 4.72 12/06/2023 04:13 AM    RBC 4.70 02/11/2021 09:31 AM    HEMOGLOBIN 15.7 12/06/2023 04:13 AM    HEMOGLOBIN 15.2 02/11/2021 09:31 AM    MCV 95 12/06/2023 04:13 AM    MCV 95.3 02/11/2021 09:31 AM    MCH 33.3 (H) 12/06/2023 04:13 AM    MCH 32.3 02/11/2021 09:31 AM    MCHC 34.9 12/06/2023 04:13 AM    MCHC 33.9 02/11/2021 09:31 AM    RDW 13.2 12/06/2023 04:13 AM    RDW 43.4 02/11/2021 09:31 AM    MPV 9.0 02/11/2021 09:31 AM       Lab Results   Component Value Date/Time    SODIUM 140 04/26/2024 11:50 AM    SODIUM 135 01/22/2024 03:35 PM    " POTASSIUM 4.5 04/26/2024 11:50 AM    POTASSIUM 4.2 01/22/2024 03:35 PM    CHLORIDE 100 04/26/2024 11:50 AM    CHLORIDE 101 01/22/2024 03:35 PM    CO2 20 04/26/2024 11:50 AM    CO2 23 01/22/2024 03:35 PM    ANION 11.0 01/22/2024 03:35 PM    GLUCOSE 129 (H) 04/26/2024 11:50 AM    GLUCOSE 139 (H) 01/22/2024 03:35 PM    BUN 11 04/26/2024 11:50 AM    BUN 16 01/22/2024 03:35 PM    CREATININE 0.89 04/26/2024 11:50 AM    CREATININE 0.75 01/22/2024 03:35 PM    CALCIUM 9.4 04/26/2024 11:50 AM    CALCIUM 9.0 01/22/2024 03:35 PM    ASTSGOT 60 (H) 04/26/2024 11:50 AM    ASTSGOT 59 (H) 01/22/2024 03:35 PM    ALTSGPT 98 (H) 04/26/2024 11:50 AM    ALTSGPT 97 (H) 01/22/2024 03:35 PM    TBILIRUBIN 0.6 04/26/2024 11:50 AM    TBILIRUBIN 0.5 01/22/2024 03:35 PM    ALBUMIN 4.1 (L) 04/26/2024 11:50 AM    ALBUMIN 4.0 01/22/2024 03:35 PM    ALBUMIN 3.52 (L) 06/29/2021 01:33 PM    TOTPROTEIN 8.1 04/26/2024 11:50 AM    TOTPROTEIN 8.7 (H) 01/22/2024 03:35 PM    TOTPROTEIN 7.3 06/29/2021 01:33 PM    GLOBULIN 4.0 04/26/2024 11:50 AM    GLOBULIN 4.7 (H) 01/22/2024 03:35 PM    AGRATIO 1.0 (L) 04/26/2024 11:50 AM    AGRATIO 0.9 01/22/2024 03:35 PM       Lab Results   Component Value Date/Time    TSHULTRASEN <0.005 (L) 01/13/2020 0850     Lab Results   Component Value Date/Time    FREET4 3.08 (H) 01/13/2020 0850     Lab Results   Component Value Date/Time    FREET3 20.30 (H) 01/13/2020 0850     No results found for: THYSTIMIG      Imaging: Please refer to thyroid uptake and scan from May 2019      ASSESSMENT/PLAN:   1. Type 2 diabetes mellitus without complication, without long-term current use of insulin (HCC)  Stable  Medication:  Metformin 1000 mg BID - continue  Recommend diet low in fats and carbs  Recommend 30 mins of activity daily   - POCT Hemoglobin A1C  - MICROALBUMIN CREAT RATIO URINE; Future  - Comp Metabolic Panel; Future    2. Hyperthyroidism  Stable  Medication:  Methimazole 10 mg daily - continue  repeat of TSH free T4 and free T3  levels in 6 months.   - TSH; Future  - T3 FREE; Future  - FREE THYROXINE; Future    3. Graves disease  This is the etiology of diagnosis #2    4. Hypertriglyceridemia  Unstable  Repeat levels in 6 months  Recommend diet low in fats and carbs  - Lipid Profile; Future    5. Elevated liver function tests  Unstable  I will get US of RUQ and refer patient to GI  - US-RUQ; Future  - Referral to Gastroenterology    6. Vitamin D insufficiency  Unstable  Recommend vitamin D 3 5000 IU alternating every other day with 98040 IU  - VITAMIN D,25 HYDROXY (DEFICIENCY); Future    7. High risk medication use  Patient is taking methimazole which is a high risk medication         Return in about 6 months (around 10/30/2024). Patient will have blood work done 1 week prior to follow up in 6 months.       Thank you kindly for allowing me to participate in the thyroid care plan for this patient.    Miller Pretty, GLENNY   4/30/24    CC:   JOSE MIGUEL Jose

## 2024-05-20 ENCOUNTER — NON-PROVIDER VISIT (OUTPATIENT)
Dept: CARDIOLOGY | Facility: MEDICAL CENTER | Age: 29
End: 2024-05-20
Attending: INTERNAL MEDICINE
Payer: MEDICAID

## 2024-05-20 DIAGNOSIS — R06.02 SOB (SHORTNESS OF BREATH): ICD-10-CM

## 2024-05-20 DIAGNOSIS — I47.9 TACHYCARDIA, PAROXYSMAL (HCC): ICD-10-CM

## 2024-05-20 RX ADMIN — HUMAN ALBUMIN MICROSPHERES AND PERFLUTREN 3 ML: 10; .22 INJECTION, SOLUTION INTRAVENOUS at 17:03

## 2024-05-20 NOTE — PROGRESS NOTES
Patient enrolled in the 24 hour 3 channel ePatch Holter monitor per Helio Rachel MD. In clinic hook up, monitor s/n [id number]. Pending EOS.

## 2024-05-21 LAB
LV EJECT FRACT  99904: 65
LV EJECT FRACT MOD 2C 99903: 56.39
LV EJECT FRACT MOD 4C 99902: 59.73
LV EJECT FRACT MOD BP 99901: 58.35

## 2024-05-21 PROCEDURE — 93306 TTE W/DOPPLER COMPLETE: CPT | Mod: 26 | Performed by: INTERNAL MEDICINE

## 2024-05-24 ENCOUNTER — HOSPITAL ENCOUNTER (OUTPATIENT)
Dept: RADIOLOGY | Facility: MEDICAL CENTER | Age: 29
End: 2024-05-24
Payer: MEDICAID

## 2024-05-24 ENCOUNTER — TELEPHONE (OUTPATIENT)
Dept: CARDIOLOGY | Facility: MEDICAL CENTER | Age: 29
End: 2024-05-24

## 2024-05-24 DIAGNOSIS — R79.89 ELEVATED LIVER FUNCTION TESTS: ICD-10-CM

## 2024-05-24 PROCEDURE — 93227 XTRNL ECG REC<48 HR R&I: CPT | Performed by: INTERNAL MEDICINE

## 2024-05-24 NOTE — TELEPHONE ENCOUNTER
Reji ePatch EOS to LORY's nurse, Daya, on 5/24/2024  Monitor noted:  Sinus rhythm,  with an avg rate of 84 BPM  6.9% tachycardia burden  No patient events recorded

## 2024-06-03 ENCOUNTER — APPOINTMENT (OUTPATIENT)
Dept: INTERNAL MEDICINE | Facility: OTHER | Age: 29
End: 2024-06-03
Payer: MEDICAID

## 2024-06-13 ENCOUNTER — OFFICE VISIT (OUTPATIENT)
Dept: INTERNAL MEDICINE | Facility: OTHER | Age: 29
End: 2024-06-13
Payer: MEDICAID

## 2024-06-13 VITALS
DIASTOLIC BLOOD PRESSURE: 63 MMHG | HEART RATE: 73 BPM | TEMPERATURE: 97.7 F | SYSTOLIC BLOOD PRESSURE: 94 MMHG | HEIGHT: 64 IN | OXYGEN SATURATION: 95 % | BODY MASS INDEX: 35.41 KG/M2 | WEIGHT: 207.4 LBS

## 2024-06-13 DIAGNOSIS — H91.92 DECREASED HEARING OF LEFT EAR: ICD-10-CM

## 2024-06-13 DIAGNOSIS — K76.0 FATTY LIVER: ICD-10-CM

## 2024-06-13 DIAGNOSIS — R79.89 ELEVATED LIVER FUNCTION TESTS: ICD-10-CM

## 2024-06-13 PROBLEM — Z00.00 HEALTHCARE MAINTENANCE: Status: RESOLVED | Noted: 2024-02-21 | Resolved: 2024-06-13

## 2024-06-13 PROCEDURE — 99214 OFFICE O/P EST MOD 30 MIN: CPT | Mod: GC

## 2024-06-13 RX ORDER — ATORVASTATIN CALCIUM 10 MG/1
10 TABLET, FILM COATED ORAL NIGHTLY
Qty: 90 TABLET | Refills: 1 | Status: SHIPPED | OUTPATIENT
Start: 2024-06-13

## 2024-06-13 ASSESSMENT — FIBROSIS 4 INDEX: FIB4 SCORE: 0.45

## 2024-06-13 NOTE — PATIENT INSTRUCTIONS
Referral #:  [id number]   Referred-To Provider            Referred-By Provider:   Gastroenterology    KOLTON Sheffield    [facility]       [address]  [address] [address] [phone] 0 [facility] [address] [phone]     Referral Start Date:  04/30/2024   Referral End Date:   04/30/2025       Referral information sent to the following:     [facility] [address] [address]  Phone: [phone]     Patient Care Coordination notes:  Ready to schedule

## 2024-06-13 NOTE — PROGRESS NOTES
Established Patient    Patient Care Team:  Neal Mendez M.D. as PCP - General (Internal Medicine)    Sander Jesse Romero is a 28 y.o. male who presents today with the following Chief Complaint(s): Follow up for Diagnoses of Decreased hearing of left ear, Fatty liver, and Elevated liver function tests were pertinent to this visit.    HPI:    Mr. Romero is a 27 yo male who came to the clinic for follow-up.   The patient was accompanied by his caregiver because the patient has difficulty verbalizing.   The caregiver states that the patient left ear function has decreased and last time previous appointment he had some wax removal in the clinic but did not improve at all, she states that the patient has been using earphones with high volume for a long time was told that we will give a referral for audiology to have an evaluation, no other symptoms like fever, chills, pain.    The patient also had right upper quadrant ultrasound which showed up he had some hepatomegaly and fatty liver, patient LDL at 100 this moment, patient also on methimazole for hypertension but is being followed by endocrinology, patient T4 at 1.18 and getting 15 mg/day of methimazole.  Will start low-dose statin with atorvastatin 10 mg/day for fatty liver, will leave to endocrinology recommendations for methimazole.  AST and ALT slightly elevated which could be related to fatty liver.      ROS:     General: No fevers, chills, night sweats, weight loss or gain  HEENT: No hearing changes, vision changes, eye pain, ear pain, nasal discharge, sore throat  Neck: No swelling in neck  Pulmonary: No shortness of breath, cough, sputum, or hemoptysis  Cardiovascular: No chest pain, palpitations, or LE swelling  GI: No nausea, vomiting, diarrhea, constipation, abdominal pain, hematochezia or melena  : No dysuria or frequency  Neuro: No focal weakness, no general weakness, no headaches, no lightheadedness, no dizziness  Psych: No anxiety or  depression    Past Medical History:   Diagnosis Date    CAP (community acquired pneumonia) 5/12/2017    CAP (community acquired pneumonia) 5/12/2017    Down syndrome     trisomy 21    Graves disease 1/16/2020    Hyperthyroidism 1/16/2020    Low HDL (under 40)     Obesity (BMI 30-39.9) 6/24/2022    Obesity (BMI 35.0-39.9 without comorbidity) ([facility]) 5/23/2017    Sepsis(995.91) 5/12/2017    Sleep apnea     work up done as a child.  no tx    Type 2 diabetes mellitus without complication, without long-term current use of insulin ([facility]) 10/22/2019    Undescended testicle     Vitamin D deficiency      Social History     Tobacco Use    Smoking status: Never    Smokeless tobacco: Never   Vaping Use    Vaping status: Never Used   Substance Use Topics    Alcohol use: Yes     Alcohol/week: 0.0 oz     Comment: rarely    Drug use: No     Current Outpatient Medications   Medication Sig Dispense Refill    atorvastatin (LIPITOR) 10 MG Tab Take 1 Tablet by mouth every evening. 90 Tablet 1    metFORMIN ER (GLUCOPHAGE XR) 500 MG TABLET SR 24 HR Take 2 tablets twice daily 360 Tablet 3    allopurinol (ZYLOPRIM) 100 MG Tab Take 1 tablet by mouth once daily 30 Tablet 3    colchicine (COLCRYS) 0.6 MG Tab Take 1 Tablet by mouth every day. 30 Tablet 2    methimazole (TAPAZOLE) 10 MG Tab Take 1.5 Tablets by mouth every day. 180 Tablet 2    Blood Glucose Monitoring Suppl Device Meter: Dispense Device of Insurance Preference. Sig. Use as directed for blood sugar monitoring. #1. NR. 1 Each 0    Blood Glucose Test Strips Test strips order:  for meter dispensed (insurance preference).  Testing one time per day E11.9 100 Strip 2    Lancets Lancets order: Lancets for meter dispensed (insurance preference) testing one time per day E10.9 100 Each 2    therapeutic multivitamin-minerals (THERAGRAN-M) Tab Take 1 Tab by mouth every day. 30 Tab 0    Cholecalciferol (VITAMIN D PO) Take 4,000 Units by mouth every day.       No current facility-administered  "medications for this visit.       Physical Exam:  BP 94/63 (BP Location: Left arm, Patient Position: Sitting, BP Cuff Size: Adult)   Pulse 73   Temp 36.5 °C (97.7 °F) (Temporal)   Ht 1.628 m (5' 4.1\")   Wt 94.1 kg (207 lb 6.4 oz)   SpO2 95%   BMI 35.49 kg/m²   General: Well developed, well nourished male, in no distress.  HEENT: NC/AT, PERRL, EOMI, no scleral icterus or conjunctival pallor, fair dentition/denture in, no nasal discharge or oral erythema or exudates.   Neck: Supple, No cervical or supraclavicular LAD  CV:RRR, no murmurs gallops or Rubs, no JVD  Pulm: LCAB, no crackles, rales, rhonchi, or wheezing  GI: Normal bowel sounds, abdomen soft, nontender, nondistended to deep or light palpation in all 4 quadrants, no HSM.  MSK: Radial and dorsalis pedis pulses 2+ and equal bilaterally, respectively.  Strength 5 out of 5 in upper and lower extremities.  No lower extremity edema  Neuro: Patient is alert and oriented x3, no focal deficits  Psych: Appropriate mood and affect       Assessment and Plan:   1. Decreased hearing of left ear  The caregiver states that the patient left ear function has decreased and last time previous appointment he had some wax removal in the clinic but did not improve at all, she states that the patient has been using earphones with high volume for a long time was told that we will give a referral for audiology to have an evaluation, no other symptoms like fever, chills, pain.  - Referral to Audiology    2. Fatty liver  3. Elevated liver function tests  The patient also had right upper quadrant ultrasound which showed up he had some hepatomegaly and fatty liver, patient LDL at 100 this moment, patient also on methimazole for hypertension but is being followed by endocrinology, patient T4 at 1.18 and getting 15 mg/day of methimazole.  Will start low-dose statin with atorvastatin 10 mg/day for fatty liver, will leave to endocrinology recommendations for methimazole.  AST and ALT " slightly elevated which could be related to fatty liver.  - atorvastatin (LIPITOR) 10 MG Tab; Take 1 Tablet by mouth every evening.  Dispense: 90 Tablet; Refill: 1  - Lipid Profile; Future        Return in about 3 months (around 9/13/2024).    Patient Instructions     Referral #:  [id number]   Referred-To Provider            Referred-By Provider:   Gastroenterology    KOLTON Sheffield    [facility]       [address]  [address] [address] [phone] [address] [facility] [address] [phone]     Referral Start Date:  04/30/2024   Referral End Date:   04/30/2025       Referral information sent to the following:     [facility] [address] [address]  Phone: [phone]     Patient Care Coordination notes:  Ready to schedule      Neal Mendez M.D. PGY   [facility]    This note was created using voice recognition software.  While every attempt is made to ensure accuracy of transcription, occasionally errors occur.

## 2024-07-30 ENCOUNTER — OFFICE VISIT (OUTPATIENT)
Dept: CARDIOLOGY | Facility: MEDICAL CENTER | Age: 29
End: 2024-07-30
Attending: INTERNAL MEDICINE
Payer: MEDICAID

## 2024-07-30 VITALS
OXYGEN SATURATION: 96 % | RESPIRATION RATE: 16 BRPM | HEIGHT: 64 IN | SYSTOLIC BLOOD PRESSURE: 110 MMHG | HEART RATE: 75 BPM | BODY MASS INDEX: 34.83 KG/M2 | DIASTOLIC BLOOD PRESSURE: 60 MMHG | WEIGHT: 204 LBS

## 2024-07-30 DIAGNOSIS — R00.2 PALPITATIONS: ICD-10-CM

## 2024-07-30 PROCEDURE — 99212 OFFICE O/P EST SF 10 MIN: CPT | Performed by: INTERNAL MEDICINE

## 2024-07-30 ASSESSMENT — FIBROSIS 4 INDEX: FIB4 SCORE: 0.45

## 2024-08-12 DIAGNOSIS — E05.90 HYPERTHYROIDISM: ICD-10-CM

## 2024-08-12 RX ORDER — METHIMAZOLE 10 MG/1
15 TABLET ORAL DAILY
Qty: 180 TABLET | Refills: 2 | Status: SHIPPED | OUTPATIENT
Start: 2024-08-12

## 2024-08-12 NOTE — TELEPHONE ENCOUNTER
Received request via: Pharmacy    Was the patient seen in the last year in this department? Yes    Does the patient have an active prescription (recently filled or refills available) for medication(s) requested? No    Pharmacy Name: [facility] # [id number]    Does the patient have longterm Plus and need 100-day supply? (This applies to ALL medications) No

## 2024-08-26 ENCOUNTER — NON-PROVIDER VISIT (OUTPATIENT)
Dept: INTERNAL MEDICINE | Facility: OTHER | Age: 29
End: 2024-08-26
Payer: MEDICAID

## 2024-08-26 VITALS — WEIGHT: 206 LBS | BODY MASS INDEX: 36.5 KG/M2 | HEIGHT: 63 IN

## 2024-08-26 DIAGNOSIS — Z71.3 DIETARY COUNSELING AND SURVEILLANCE: ICD-10-CM

## 2024-08-26 DIAGNOSIS — E11.9 TYPE 2 DIABETES MELLITUS WITHOUT COMPLICATION, WITHOUT LONG-TERM CURRENT USE OF INSULIN (HCC): ICD-10-CM

## 2024-08-26 DIAGNOSIS — E66.9 OBESITY (BMI 30-39.9): ICD-10-CM

## 2024-08-26 DIAGNOSIS — M10.00 ACUTE IDIOPATHIC GOUT, UNSPECIFIED SITE: ICD-10-CM

## 2024-08-26 ASSESSMENT — FIBROSIS 4 INDEX: FIB4 SCORE: 0.45

## 2024-08-26 NOTE — PATIENT INSTRUCTIONS
I will change up my meal routine  - more choices to choose from  - bring in more variety  - understand carbs/meal  - aim for 45 grams carbs/meal

## 2024-08-26 NOTE — PROGRESS NOTES
"Sander Romero is a 28 y.o. year old, male initial nutrition evaluation for T2DM, metformin only at 2000 MG/day.    ROS: high uric acid, gout pain twice a month; allupurinol  Grave's disease- had lost weight, now gaining weight with increased hunger.    Other meds: atorvastatin, allopurinol, colchicine    My Health Profile:    PHYSICAL ASSESSMENT  Current Height:  64\"  Ht Readings from Last 1 Encounters:   07/30/24 1.626 m (5' 4\")     Current Weight:    Wt Readings from Last 1 Encounters:   07/30/24 92.5 kg (204 lb)     BMI:  36    FLUID INTAKE:  water-does not like; mom pushes coffee, tea, carbonated water; diet soda, occ regular soda  Typical Beverages: 2% milk-controlled portions  Servings Alcohol/D/WK/MO: none controlled by mom; once a year    ACTIVITY REVIEW: likes to swim, bike riding; window shopping  Current Exercise: treadmill,10-20 min,  has to push Sander  Hobbies: guitar, drone, loves music, computer    PERTINENT LABS:    Latest Reference Range & Units 05/02/23 13:47 10/19/23 13:17 01/22/24 15:35 04/30/24 14:55   Glycohemoglobin 5.8 % 6.0 ! 6.0 ! 6.1 (H) 6.1 !     Patient Behaviors (indicate frequency)  Meal/Snack Pattern:      Mom attentive to his diabetes, watches portions, went to diabetes class when Sander diagnosed.    [id number]- asking for breakfast  1-glass milk  2-eggs  Bread or 1-tortilla  1 yogurt  1200  [place] or home meal: beans, soup  1500  Always hungry  Chicken+salad, HB eggs  [id number] HS: PB sandwich +  milk    Homemade tacos   [place] without frying    Vegetables: salad-spinach, naveen  Fruit: apples, oranges, berries, bananas; 3 x/week  Bread: lower calorie breads    Will eat anything except beans  Cannot eat fish, rice- not tolerated; fish causes uric acid increase; cannot eat sushi, processed rossi and ham.    Dines away from Home: aims for less frequent, only when commuting from [place] into town  Locations:  Sander loves [place]  Who lives in home: parents, " "sister, self; 5-altogether, shares meals  Additional Patient Behavior Information: tends to have binge eating, but family all supportive to help Sander    PES: Obesity II, T2DM, Gout flare r/t carb heavy eating pattern,lack of satiety with low intake fiber veggies/fruit, legumes as evidenced by BMI 36, A1c 6.1%    NUTRITION COMMENTS:    Sander is a 27 yo with Down's Syndrome; sleep has been disrupted since Grave's disease, was sleeping less than 1 hour/day now longer ~ 12 hours.    Mom checks BG once a week  Usually 2 hours after a meal: 110-112 mg/dl  Metformin tolerated without complaints    Mom and sister present at visit. Mom (Maria Esther) reports difficulty providing proper diet for Sander with DM information she has received. Limited variety with uncertainty if she can give Sander foods she was told as \"too high in sugar\".    Clarified much of Maria Esther's frustrations, coached on great work she has done for Sander and how new practices can be brought in that open up her selections and variety for Sander within DSM.    RTC x 3-4 months    Time Spent: 60 minutes   "

## 2024-10-21 ENCOUNTER — TELEPHONE (OUTPATIENT)
Dept: INTERNAL MEDICINE | Facility: OTHER | Age: 29
End: 2024-10-21
Payer: MEDICAID

## 2024-10-22 ENCOUNTER — TELEPHONE (OUTPATIENT)
Dept: ENDOCRINOLOGY | Facility: MEDICAL CENTER | Age: 29
End: 2024-10-22
Payer: MEDICAID

## 2024-10-26 LAB
25(OH)D3+25(OH)D2 SERPL-MCNC: 32.8 NG/ML (ref 30–100)
ALBUMIN SERPL-MCNC: 4.3 G/DL (ref 4.3–5.2)
ALBUMIN/CREAT UR: 6 MG/G CREAT (ref 0–29)
ALP SERPL-CCNC: 115 IU/L (ref 44–121)
ALT SERPL-CCNC: 124 IU/L (ref 0–44)
AST SERPL-CCNC: 79 IU/L (ref 0–40)
BILIRUB SERPL-MCNC: 0.7 MG/DL (ref 0–1.2)
BUN SERPL-MCNC: 11 MG/DL (ref 6–20)
BUN/CREAT SERPL: 13 (ref 9–20)
CALCIUM SERPL-MCNC: 9.4 MG/DL (ref 8.7–10.2)
CHLORIDE SERPL-SCNC: 102 MMOL/L (ref 96–106)
CHOLEST SERPL-MCNC: 145 MG/DL (ref 100–199)
CO2 SERPL-SCNC: 22 MMOL/L (ref 20–29)
CREAT SERPL-MCNC: 0.86 MG/DL (ref 0.76–1.27)
CREAT UR-MCNC: 336.4 MG/DL
EGFRCR SERPLBLD CKD-EPI 2021: 120 ML/MIN/1.73
GLOBULIN SER CALC-MCNC: 3.7 G/DL (ref 1.5–4.5)
GLUCOSE SERPL-MCNC: 137 MG/DL (ref 70–99)
HDLC SERPL-MCNC: 37 MG/DL
LDL CALC COMMENT:: ABNORMAL
LDLC SERPL CALC-MCNC: 83 MG/DL (ref 0–99)
MICROALBUMIN UR-MCNC: 21.1 UG/ML
POTASSIUM SERPL-SCNC: 4.7 MMOL/L (ref 3.5–5.2)
PROT SERPL-MCNC: 8 G/DL (ref 6–8.5)
SODIUM SERPL-SCNC: 142 MMOL/L (ref 134–144)
T3FREE SERPL-MCNC: 3.4 PG/ML (ref 2–4.4)
T4 FREE SERPL-MCNC: 1.26 NG/DL (ref 0.82–1.77)
TRIGL SERPL-MCNC: 141 MG/DL (ref 0–149)
TSH SERPL DL<=0.005 MIU/L-ACNC: 1.23 UIU/ML (ref 0.45–4.5)
VLDLC SERPL CALC-MCNC: 25 MG/DL (ref 5–40)

## 2024-10-29 ENCOUNTER — OFFICE VISIT (OUTPATIENT)
Dept: ENDOCRINOLOGY | Facility: MEDICAL CENTER | Age: 29
End: 2024-10-29
Payer: MEDICAID

## 2024-10-29 VITALS
OXYGEN SATURATION: 95 % | WEIGHT: 207 LBS | HEART RATE: 87 BPM | SYSTOLIC BLOOD PRESSURE: 124 MMHG | BODY MASS INDEX: 35.34 KG/M2 | DIASTOLIC BLOOD PRESSURE: 62 MMHG | HEIGHT: 64 IN

## 2024-10-29 DIAGNOSIS — R79.89 ELEVATED LIVER FUNCTION TESTS: ICD-10-CM

## 2024-10-29 DIAGNOSIS — E05.90 HYPERTHYROIDISM: ICD-10-CM

## 2024-10-29 DIAGNOSIS — E55.9 VITAMIN D INSUFFICIENCY: ICD-10-CM

## 2024-10-29 DIAGNOSIS — E05.00 GRAVES DISEASE: ICD-10-CM

## 2024-10-29 DIAGNOSIS — E11.9 TYPE 2 DIABETES MELLITUS WITHOUT COMPLICATION, WITHOUT LONG-TERM CURRENT USE OF INSULIN (HCC): ICD-10-CM

## 2024-10-29 DIAGNOSIS — Z79.899 HIGH RISK MEDICATION USE: ICD-10-CM

## 2024-10-29 DIAGNOSIS — E78.1 HYPERTRIGLYCERIDEMIA: ICD-10-CM

## 2024-10-29 LAB
HBA1C MFR BLD: 6.3 % (ref ?–5.8)
POCT INT CON NEG: NEGATIVE
POCT INT CON POS: POSITIVE

## 2024-10-29 PROCEDURE — 99212 OFFICE O/P EST SF 10 MIN: CPT

## 2024-10-29 PROCEDURE — 83036 HEMOGLOBIN GLYCOSYLATED A1C: CPT

## 2024-10-29 ASSESSMENT — FIBROSIS 4 INDEX: FIB4 SCORE: 0.55

## 2024-11-06 LAB — RETINAL SCREEN: NEGATIVE

## 2024-11-29 DIAGNOSIS — K76.0 FATTY LIVER: ICD-10-CM

## 2024-12-02 RX ORDER — ATORVASTATIN CALCIUM 10 MG/1
10 TABLET, FILM COATED ORAL EVERY EVENING
Qty: 90 TABLET | Refills: 0 | Status: SHIPPED | OUTPATIENT
Start: 2024-12-02

## 2024-12-02 NOTE — TELEPHONE ENCOUNTER
Received request via: Pharmacy    Was the patient seen in the last year in this department? Yes    Does the patient have an active prescription (recently filled or refills available) for medication(s) requested? No    Pharmacy Name: [facility]    Does the patient have nursing home Plus and need 100-day supply? (This applies to ALL medications) Patient does not have SCP

## 2024-12-29 LAB
T3FREE SERPL-MCNC: 3.3 PG/ML (ref 2–4.4)
T4 FREE SERPL-MCNC: 1.19 NG/DL (ref 0.82–1.77)
TSH SERPL DL<=0.005 MIU/L-ACNC: 1.28 UIU/ML (ref 0.45–4.5)

## 2025-02-27 DIAGNOSIS — K76.0 FATTY LIVER: ICD-10-CM

## 2025-02-27 RX ORDER — ATORVASTATIN CALCIUM 10 MG/1
10 TABLET, FILM COATED ORAL EVERY EVENING
Qty: 90 TABLET | Refills: 0 | Status: SHIPPED | OUTPATIENT
Start: 2025-02-27

## 2025-02-27 NOTE — TELEPHONE ENCOUNTER
Received request via: Pharmacy    Was the patient seen in the last year in this department? Yes    Does the patient have an active prescription (recently filled or refills available) for medication(s) requested?  yes    [facility]    Does the patient have [facility] Plus and need 100-day supply? (This applies to ALL medications) Patient does not have SCP

## 2025-03-04 ENCOUNTER — OFFICE VISIT (OUTPATIENT)
Dept: ENDOCRINOLOGY | Facility: MEDICAL CENTER | Age: 30
End: 2025-03-04
Payer: MEDICAID

## 2025-03-04 VITALS
BODY MASS INDEX: 35.34 KG/M2 | DIASTOLIC BLOOD PRESSURE: 70 MMHG | WEIGHT: 207 LBS | HEART RATE: 80 BPM | OXYGEN SATURATION: 95 % | HEIGHT: 64 IN | SYSTOLIC BLOOD PRESSURE: 98 MMHG

## 2025-03-04 DIAGNOSIS — R79.89 ELEVATED LIVER FUNCTION TESTS: ICD-10-CM

## 2025-03-04 DIAGNOSIS — E05.00 GRAVES DISEASE: ICD-10-CM

## 2025-03-04 DIAGNOSIS — E11.9 TYPE 2 DIABETES MELLITUS WITHOUT COMPLICATION, WITHOUT LONG-TERM CURRENT USE OF INSULIN (HCC): ICD-10-CM

## 2025-03-04 DIAGNOSIS — Z79.899 HIGH RISK MEDICATION USE: ICD-10-CM

## 2025-03-04 DIAGNOSIS — E78.1 HYPERTRIGLYCERIDEMIA: ICD-10-CM

## 2025-03-04 DIAGNOSIS — E05.90 HYPERTHYROIDISM: ICD-10-CM

## 2025-03-04 DIAGNOSIS — E55.9 VITAMIN D INSUFFICIENCY: ICD-10-CM

## 2025-03-04 LAB
HBA1C MFR BLD: 6.3 % (ref ?–5.8)
POCT INT CON NEG: NEGATIVE
POCT INT CON POS: POSITIVE

## 2025-03-04 PROCEDURE — 99212 OFFICE O/P EST SF 10 MIN: CPT

## 2025-03-04 PROCEDURE — 3074F SYST BP LT 130 MM HG: CPT

## 2025-03-04 PROCEDURE — 99214 OFFICE O/P EST MOD 30 MIN: CPT

## 2025-03-04 PROCEDURE — 3078F DIAST BP <80 MM HG: CPT

## 2025-03-04 PROCEDURE — 83036 HEMOGLOBIN GLYCOSYLATED A1C: CPT

## 2025-03-04 RX ORDER — METHIMAZOLE 10 MG/1
10 TABLET ORAL DAILY
Qty: 90 TABLET | Refills: 3 | Status: SHIPPED | OUTPATIENT
Start: 2025-03-04

## 2025-03-04 RX ORDER — METFORMIN HYDROCHLORIDE 500 MG/1
1000 TABLET, EXTENDED RELEASE ORAL 2 TIMES DAILY
Qty: 360 TABLET | Refills: 3 | Status: SHIPPED | OUTPATIENT
Start: 2025-03-04

## 2025-03-04 ASSESSMENT — FIBROSIS 4 INDEX: FIB4 SCORE: 0.55

## 2025-03-04 NOTE — PROGRESS NOTES
Chief Complaint: Follow up for Hyperthyroidism secondary to Grave's disease and type 2 diabetes and history of Down syndrome.     HPI:     Sander Romero is a 27 y.o. male here for follow up of of the above medical issues.   He was previously a patient of KATHERINE Brush.    Hyperthyroidism  He has Hyperthyroidism first diagnosed in April 2019 with a positive thyroid uptake and scan from May 2019 showing increased 5-hour uptake of 65% c/w Graves' disease.     Previously his hyperthyroidism was difficult to control due to noncompliance with methimazole therapy.      Patient has Down syndrome and he does not like taking medications.    His mother also originally did not understand why the patient had to take methimazole.    Denies: anxiety, tremors, palpitations    Currently he is taking methimazole 10 mg daily for many years.   He reports better compliance  He reports missing weekend    Latest Reference Range & Units 12/28/24 09:30   TSH 0.450 - 4.500 uIU/mL 1.280   Free T-4 0.82 - 1.77 ng/dL 1.19   T3,Free 2.0 - 4.4 pg/mL 3.3       2. Diabetes  Type 2  POC A1c on 3/4/25 is 6.3  POC A1c on 10/29/24 is 6.3  Patient also has type 2 diabetes initially discovered in Aug 2018.    His A1c was 6.1% on 1/22/24    Current Medications:  He is on Metformin  mg 2 pills  twice a day.  He denies Se with his metformin   He is trying to follow low carb - his mom is watching his diet and portions control  Activity: three times a week treadmill for 45 mins    3. Hypertriglyceridemia  Currently taking atormastatin 10 mg every evenin g   Latest Reference Range & Units 10/25/24 12:25   Cholesterol,Tot 100 - 199 mg/dL 145   Triglycerides 0 - 149 mg/dL 141   HDL >39 mg/dL 37 (L)   LDL Chol Calc (NIH) 0 - 99 mg/dL 83   VLDL Cholesterol Calc 5 - 40 mg/dL 25   LDL Calc Comment:  CANCELED     He does not have diabetic kidney disease   Latest Reference Range & Units 10/25/24 12:25   Creatinine, Random Urine Not Estab. mg/dL  336.4   Microalbumin, Urine Random Not Estab. ug/mL 21.1   Microalbumin-Creatinine 0 - 29 mg/g creat 6      Latest Reference Range & Units 10/25/24 12:25   eGFR >59 mL/min/1.73 120     Eye exam was on done today on 03/27/24      4. Vitamin D deficiency  Currently taking Vitamin D 3 5000 IU daily   Latest Reference Range & Units 10/25/24 12:25   25-Hydroxy   Vitamin D 25 30.0 - 100.0 ng/mL 32.8     5. Elevated liver function test  Followed by GI   Latest Reference Range & Units 10/25/24 12:25   AST(SGOT) 0 - 40 IU/L 79 (H)   ALT(SGPT) 0 - 44 IU/L 124 (H)   Alkaline Phosphatase 44 - 121 IU/L 115     US of RUQ: 5/24/24  IMPRESSION:     1. Hepatomegaly and hepatic steatosis.  2. The remainder of the right upper quadrant abdominal sonogram is within normal limits.    Patient's medications, allergies, and social histories were reviewed and updated as appropriate.      ROS:     CONS:     No fever, no chills   EYES:     No diplopia, no blurry vision   CV:           No chest pain, no palpitations   PULM:     No SOB, no cough, no hemoptysis.   GI:            No nausea, no vomiting, no diarrhea, no constipation   ENDO:     No polyuria, no polydipsia, no heat intolerance, no cold intolerance       Past Medical History:  Problem List:  2024-07: Palpitations  2024-04: Elevated liver function tests  2024-03: SOB (shortness of breath)  2024-02: Tachycardia, paroxysmal ([facility])  2024-02: Healthcare maintenance  2023-10: Umbilical hernia without obstruction and without gangrene  2023-10: Hypertriglyceridemia  2023-09: Chronic idiopathic gout of left foot  2022-07: High risk medication use  2022-06: Obesity (BMI 30-39.9)  2020-01: Hyperthyroidism  2020-01: Graves disease  2019-10: Type 2 diabetes mellitus without complication, without long-  term current use of insulin (HCC)  2017-05: Obesity (BMI 35.0-39.9 without comorbidity) (HCC)  2017-05: Sepsis(995.91)  2017-05: CAP (community acquired pneumonia)  Down syndrome  Low HDL (under  "40)  Vitamin D insufficiency  Impaired fasting glucose  Sleep apnea  Undescended testicle      Past Surgical History:  Past Surgical History:   Procedure Laterality Date    MYRINGOTOMY      OTHER      surgery for undescended testies.    TESTICLE EXPLORATION      undescended testicle    TONSILLECTOMY      TONSILLECTOMY AND ADENOIDECTOMY          Allergies:  Patient has no known allergies.     Social History:  Social History     Tobacco Use    Smoking status: Never    Smokeless tobacco: Never   Vaping Use    Vaping status: Never Used   Substance Use Topics    Alcohol use: Yes     Alcohol/week: 0.0 oz     Comment: rarely    Drug use: No        Family History:   family history includes Arthritis in his maternal grandmother and mother; Cancer in his paternal grandfather; Diabetes in his father, maternal grandfather, paternal grandfather, and paternal grandmother; Heart Disease in his mother; Hypertension in his mother; Stroke in his maternal grandfather.      PHYSICAL EXAM:   Vital signs: BP 98/70   Pulse 80   Ht 1.626 m (5' 4\")   Wt 93.9 kg (207 lb)   SpO2 95%   BMI 35.53 kg/m²   GENERAL: Obese young gentleman with Down syndrome in no apparent distress.   EYE:  No ocular asymmetry, PERRLA, No exophthalmos or lid lag  HENT: Pink, moist mucous membranes.    NECK: No thyromegaly.   CARDIOVASCULAR:  No murmurs  LUNGS: Clear breath sounds  ABDOMEN: Soft, nontender   EXTREMITIES: No clubbing, cyanosis, or edema.   NEUROLOGICAL: No gross focal motor abnormalities, there is visible tremors with both hands  LYMPH: No cervical adenopathy palpated.   SKIN: No rashes, lesions.       Labs:  Lab Results   Component Value Date/Time    WBC 5.7 12/06/2023 04:13 AM    WBC 7.0 02/11/2021 09:31 AM    RBC 4.72 12/06/2023 04:13 AM    RBC 4.70 02/11/2021 09:31 AM    HEMOGLOBIN 15.7 12/06/2023 04:13 AM    HEMOGLOBIN 15.2 02/11/2021 09:31 AM    MCV 95 12/06/2023 04:13 AM    MCV 95.3 02/11/2021 09:31 AM    MCH 33.3 (H) 12/06/2023 04:13 AM "    MCH 32.3 02/11/2021 09:31 AM    MCHC 34.9 12/06/2023 04:13 AM    MCHC 33.9 02/11/2021 09:31 AM    RDW 13.2 12/06/2023 04:13 AM    RDW 43.4 02/11/2021 09:31 AM    MPV 9.0 02/11/2021 09:31 AM       Lab Results   Component Value Date/Time    SODIUM 142 10/25/2024 12:25 PM    SODIUM 135 01/22/2024 03:35 PM    POTASSIUM 4.7 10/25/2024 12:25 PM    POTASSIUM 4.2 01/22/2024 03:35 PM    CHLORIDE 102 10/25/2024 12:25 PM    CHLORIDE 101 01/22/2024 03:35 PM    CO2 22 10/25/2024 12:25 PM    CO2 23 01/22/2024 03:35 PM    ANION 11.0 01/22/2024 03:35 PM    GLUCOSE 137 (H) 10/25/2024 12:25 PM    GLUCOSE 139 (H) 01/22/2024 03:35 PM    BUN 11 10/25/2024 12:25 PM    BUN 16 01/22/2024 03:35 PM    CREATININE 0.86 10/25/2024 12:25 PM    CREATININE 0.75 01/22/2024 03:35 PM    CALCIUM 9.4 10/25/2024 12:25 PM    CALCIUM 9.0 01/22/2024 03:35 PM    ASTSGOT 79 (H) 10/25/2024 12:25 PM    ASTSGOT 59 (H) 01/22/2024 03:35 PM    ALTSGPT 124 (H) 10/25/2024 12:25 PM    ALTSGPT 97 (H) 01/22/2024 03:35 PM    TBILIRUBIN 0.7 10/25/2024 12:25 PM    TBILIRUBIN 0.5 01/22/2024 03:35 PM    ALBUMIN 4.3 10/25/2024 12:25 PM    ALBUMIN 4.0 01/22/2024 03:35 PM    ALBUMIN 3.52 (L) 06/29/2021 01:33 PM    TOTPROTEIN 8.0 10/25/2024 12:25 PM    TOTPROTEIN 8.7 (H) 01/22/2024 03:35 PM    TOTPROTEIN 7.3 06/29/2021 01:33 PM    GLOBULIN 3.7 10/25/2024 12:25 PM    GLOBULIN 4.7 (H) 01/22/2024 03:35 PM    AGRATIO 1.0 (L) 04/26/2024 11:50 AM    AGRATIO 0.9 01/22/2024 03:35 PM       Lab Results   Component Value Date/Time    TSHULTRASEN <0.005 (L) 01/13/2020 0850     Lab Results   Component Value Date/Time    FREET4 3.08 (H) 01/13/2020 0850     Lab Results   Component Value Date/Time    FREET3 20.30 (H) 01/13/2020 0850     No results found for: THYSTIMIG      Imaging: Please refer to thyroid uptake and scan from May 2019      ASSESSMENT/PLAN:   1. Type 2 diabetes mellitus without complication, without long-term current use of insulin (HCC)  Stable  Medication:  Metformin 1000  mg BID - continue  Recommend diet low in fats and carbs  Recommend 30 mins of activity daily   - POCT Hemoglobin A1C  - Comp Metabolic Panel; Future  - MICROALBUMIN CREAT RATIO URINE; Future  - metFORMIN ER (GLUCOPHAGE XR) 500 MG TABLET SR 24 HR; Take 2 Tablets by mouth 2 times a day.  Dispense: 360 Tablet; Refill: 3    2. Hyperthyroidism  Stable  Patient has been euthroid for a year.  Medication:  Methimazole 10 mg 6 days a week - continue  - TSH; Future  - FREE THYROXINE; Future  - methimazole (TAPAZOLE) 10 MG Tab; Take 1 Tablet by mouth every day.  Dispense: 90 Tablet; Refill: 3    3. Graves disease  This is the etiology of diagnosis #2    4. Elevated liver function tests  Unstable  This is followed by GI  - Comp Metabolic Panel; Future    5. Hypertriglyceridemia  Stable  Continue current regimen- see hPI  We will continue to monitor  - Lipid Profile; Future    6. Vitamin D insufficiency  Stable  Continue current regimen- see hPI  - VITAMIN D,25 HYDROXY (DEFICIENCY); Future    7. High risk medication use  Patient is taking methimazole which is a high risk medication. I explained the side effects and rarely it can cause liver enzyme elevation and decrease in neutrophil counts and vasculitis.      Return in about 3 months (around 6/4/2025). Patient will have blood work done prior to follow up in 3 months.     Thank you kindly for allowing me to participate in the thyroid care plan for this patient.    Miller Pretty, GLENNY   3/4/25    CC:   JOSE MIGUEL Jose

## 2025-05-19 DIAGNOSIS — K76.0 FATTY LIVER: ICD-10-CM

## 2025-05-19 NOTE — TELEPHONE ENCOUNTER
Received request via: Patient    Was the patient seen in the last year in this department? Yes    Does the patient have an active prescription (recently filled or refills available) for medication(s) requested? No    Pharmacy Name: [facility]    Does the patient have longterm Plus and need 100-day supply? (This applies to ALL medications) Patient does not have SCP

## 2025-05-21 RX ORDER — ATORVASTATIN CALCIUM 10 MG/1
10 TABLET, FILM COATED ORAL EVERY EVENING
Qty: 30 TABLET | Refills: 0 | Status: SHIPPED | OUTPATIENT
Start: 2025-05-21

## 2025-06-10 LAB
25(OH)D3+25(OH)D2 SERPL-MCNC: 31.8 NG/ML (ref 30–100)
ALBUMIN SERPL-MCNC: 4.2 G/DL (ref 4.3–5.2)
ALBUMIN/CREAT UR: 4 MG/G CREAT (ref 0–29)
ALP SERPL-CCNC: 119 IU/L (ref 44–121)
ALT SERPL-CCNC: 68 IU/L (ref 0–44)
AST SERPL-CCNC: 42 IU/L (ref 0–40)
BILIRUB SERPL-MCNC: 0.5 MG/DL (ref 0–1.2)
BUN SERPL-MCNC: 14 MG/DL (ref 6–20)
BUN/CREAT SERPL: 16 (ref 9–20)
CALCIUM SERPL-MCNC: 9.5 MG/DL (ref 8.7–10.2)
CHLORIDE SERPL-SCNC: 99 MMOL/L (ref 96–106)
CHOLEST SERPL-MCNC: 115 MG/DL (ref 100–199)
CO2 SERPL-SCNC: 19 MMOL/L (ref 20–29)
CREAT SERPL-MCNC: 0.89 MG/DL (ref 0.76–1.27)
CREAT UR-MCNC: 151.7 MG/DL
EGFRCR SERPLBLD CKD-EPI 2021: 119 ML/MIN/1.73
GLOBULIN SER CALC-MCNC: 3.4 G/DL (ref 1.5–4.5)
GLUCOSE SERPL-MCNC: 129 MG/DL (ref 70–99)
HDLC SERPL-MCNC: 33 MG/DL
LDL CALC COMMENT:: ABNORMAL
LDLC SERPL CALC-MCNC: 51 MG/DL (ref 0–99)
MICROALBUMIN UR-MCNC: 6 UG/ML
POTASSIUM SERPL-SCNC: 4.5 MMOL/L (ref 3.5–5.2)
PROT SERPL-MCNC: 7.6 G/DL (ref 6–8.5)
SODIUM SERPL-SCNC: 136 MMOL/L (ref 134–144)
T4 FREE SERPL-MCNC: 1.12 NG/DL (ref 0.82–1.77)
TRIGL SERPL-MCNC: 187 MG/DL (ref 0–149)
TSH SERPL DL<=0.005 MIU/L-ACNC: 2.6 UIU/ML (ref 0.45–4.5)
VLDLC SERPL CALC-MCNC: 31 MG/DL (ref 5–40)

## 2025-06-11 ENCOUNTER — OFFICE VISIT (OUTPATIENT)
Dept: ENDOCRINOLOGY | Facility: MEDICAL CENTER | Age: 30
End: 2025-06-11
Payer: MEDICAID

## 2025-06-11 VITALS
SYSTOLIC BLOOD PRESSURE: 124 MMHG | WEIGHT: 205 LBS | DIASTOLIC BLOOD PRESSURE: 68 MMHG | OXYGEN SATURATION: 91 % | HEART RATE: 97 BPM | HEIGHT: 64 IN | BODY MASS INDEX: 35 KG/M2

## 2025-06-11 DIAGNOSIS — E55.9 VITAMIN D INSUFFICIENCY: ICD-10-CM

## 2025-06-11 DIAGNOSIS — E05.00 GRAVES DISEASE: ICD-10-CM

## 2025-06-11 DIAGNOSIS — E11.9 TYPE 2 DIABETES MELLITUS WITHOUT COMPLICATION, WITHOUT LONG-TERM CURRENT USE OF INSULIN (HCC): Primary | ICD-10-CM

## 2025-06-11 DIAGNOSIS — R79.89 ELEVATED LIVER FUNCTION TESTS: ICD-10-CM

## 2025-06-11 DIAGNOSIS — E78.1 HYPERTRIGLYCERIDEMIA: ICD-10-CM

## 2025-06-11 DIAGNOSIS — E05.90 HYPERTHYROIDISM: ICD-10-CM

## 2025-06-11 DIAGNOSIS — Z79.899 HIGH RISK MEDICATION USE: ICD-10-CM

## 2025-06-11 PROCEDURE — 83036 HEMOGLOBIN GLYCOSYLATED A1C: CPT

## 2025-06-11 PROCEDURE — 3078F DIAST BP <80 MM HG: CPT

## 2025-06-11 PROCEDURE — 99214 OFFICE O/P EST MOD 30 MIN: CPT

## 2025-06-11 PROCEDURE — 99213 OFFICE O/P EST LOW 20 MIN: CPT

## 2025-06-11 PROCEDURE — 3074F SYST BP LT 130 MM HG: CPT

## 2025-06-11 ASSESSMENT — FIBROSIS 4 INDEX: FIB4 SCORE: 0.39

## 2025-06-11 NOTE — PROGRESS NOTES
Chief Complaint: Follow up for Hyperthyroidism secondary to Grave's disease and type 2 diabetes and history of Down syndrome.     HPI:     Sander Romero is a 27 y.o. male here for follow up of of the above medical issues.   He was previously a patient of KATHERINE Brush.    Hyperthyroidism  He has Hyperthyroidism first diagnosed in April 2019 with a positive thyroid uptake and scan from May 2019 showing increased 5-hour uptake of 65% c/w Graves' disease.     Previously his hyperthyroidism was difficult to control due to noncompliance with methimazole therapy.      Patient has Down syndrome and he does not like taking medications.    His mother also originally did not understand why the patient had to take methimazole.    Denies: anxiety, tremors, palpitations  Denies: fatigue    Currently he is taking methimazole 10 mg 6 days a week for many years.   He reports better compliance    He is doing good today. His mother reports due to family emergency she was unable to care for him for a few days, and he was taken care of from him family therefore he did refuse taking medication at times.    Latest Reference Range & Units 06/09/25 06:35   TSH 0.450 - 4.500 uIU/mL 2.600   Free T-4 0.82 - 1.77 ng/dL 1.12       2. Diabetes  Type 2  POC A1c on 6/11/25 6.3  POC A1c on 3/4/25 is 6.3  POC A1c on 10/29/24 is 6.3  Patient also has type 2 diabetes initially discovered in Aug 2018.    His A1c was 6.1% on 1/22/24    Current Medications:  He is on Metformin  mg 2 pills  twice a day.  He denies Se with his metformin   He is trying to follow low carb - his mom is watching his diet and portions control  Activity: three times a week treadmill for 45 mins    3. Hypertriglyceridemia  Currently taking atorvastatin 10 mg every evenin g   Latest Reference Range & Units 06/09/25 06:35   Cholesterol,Tot 100 - 199 mg/dL 115   Triglycerides 0 - 149 mg/dL 187 (H)   HDL >39 mg/dL 33 (L)   LDL Chol Calc ([facility]) 0 - 99 mg/dL 51    VLDL Cholesterol Calc 5 - 40 mg/dL 31     He does not have diabetic kidney disease   Latest Reference Range & Units 06/09/25 06:35   Creatinine, Random Urine Not Estab. mg/dL 151.7   Microalbumin, Urine Random Not Estab. ug/mL 6.0   Microalbumin-Creatinine 0 - 29 mg/g creat 4      Latest Reference Range & Units 06/09/25 06:35   eGFR >59 mL/min/1.73 119     Eye exam was on done today on 03/27/24      4. Vitamin D deficiency  Currently taking Vitamin D 3 5000 IU daily   Latest Reference Range & Units 06/09/25 06:35   25-Hydroxy   Vitamin D 25 30.0 - 100.0 ng/mL 31.8     5. Elevated liver function test  Followed by GI   Latest Reference Range & Units 10/25/24 12:25 06/09/25 06:35   AST(SGOT) 0 - 40 IU/L 79 (H) 42 (H)   ALT(SGPT) 0 - 44 IU/L 124 (H) 68 (H)   Alkaline Phosphatase 44 - 121 IU/L 115 119       US of RUQ: 5/24/24  IMPRESSION:     1. Hepatomegaly and hepatic steatosis.  2. The remainder of the right upper quadrant abdominal sonogram is within normal limits.    Patient's medications, allergies, and social histories were reviewed and updated as appropriate.      ROS:     CONS:     No fever, no chills   EYES:     No diplopia, no blurry vision   CV:           No chest pain, no palpitations   PULM:     No SOB, no cough, no hemoptysis.   GI:            No nausea, no vomiting, no diarrhea, no constipation   ENDO:     No polyuria, no polydipsia, no heat intolerance, no cold intolerance       Past Medical History:  Problem List:  2024-07: Palpitations  2024-04: Elevated liver function tests  2024-03: SOB (shortness of breath)  2024-02: Tachycardia, paroxysmal (HCC)  2024-02: Healthcare maintenance  2023-10: Umbilical hernia without obstruction and without gangrene  2023-10: Hypertriglyceridemia  2023-09: Chronic idiopathic gout of left foot  2022-07: High risk medication use  2022-06: Obesity (BMI 30-39.9)  2020-01: Hyperthyroidism  2020-01: Graves disease  2019-10: Type 2 diabetes mellitus without complication,  "without long-  term current use of insulin ([facility])  2017-05: Obesity (BMI 35.0-39.9 without comorbidity) ([facility])  2017-05: Sepsis(995.91)  2017-05: CAP (community acquired pneumonia)  Down syndrome  Low HDL (under 40)  Vitamin D insufficiency  Impaired fasting glucose  Sleep apnea  Undescended testicle      Past Surgical History:  Past Surgical History:   Procedure Laterality Date    MYRINGOTOMY      OTHER      surgery for undescended testies.    TESTICLE EXPLORATION      undescended testicle    TONSILLECTOMY      TONSILLECTOMY AND ADENOIDECTOMY          Allergies:  Patient has no known allergies.     Social History:  Social History     Tobacco Use    Smoking status: Never    Smokeless tobacco: Never   Vaping Use    Vaping status: Never Used   Substance Use Topics    Alcohol use: Yes     Alcohol/week: 0.0 oz     Comment: rarely    Drug use: No        Family History:   family history includes Arthritis in his maternal grandmother and mother; Cancer in his paternal grandfather; Diabetes in his father, maternal grandfather, paternal grandfather, and paternal grandmother; Heart Disease in his mother; Hypertension in his mother; Stroke in his maternal grandfather.      PHYSICAL EXAM:   Vital signs: /68 (BP Location: Left arm, Patient Position: Sitting, BP Cuff Size: Adult)   Pulse 97   Ht 1.626 m (5' 4\")   Wt 93 kg (205 lb)   SpO2 91%   BMI 35.19 kg/m²   GENERAL: Obese young gentleman with Down syndrome in no apparent distress.   EYE:  No ocular asymmetry, PERRLA, No exophthalmos or lid lag  HENT: Pink, moist mucous membranes.    NECK: No thyromegaly.   CARDIOVASCULAR:  No murmurs  LUNGS: Clear breath sounds  ABDOMEN: Soft, nontender   EXTREMITIES: No clubbing, cyanosis, or edema.   NEUROLOGICAL: No gross focal motor abnormalities, there is visible tremors with both hands  LYMPH: No cervical adenopathy palpated.   SKIN: No rashes, lesions.       Labs:  Lab Results   Component Value Date/Time    WBC 5.7 12/06/2023 " 04:13 AM    WBC 7.0 02/11/2021 09:31 AM    RBC 4.72 12/06/2023 04:13 AM    RBC 4.70 02/11/2021 09:31 AM    HEMOGLOBIN 15.7 12/06/2023 04:13 AM    HEMOGLOBIN 15.2 02/11/2021 09:31 AM    MCV 95 12/06/2023 04:13 AM    MCV 95.3 02/11/2021 09:31 AM    MCH 33.3 (H) 12/06/2023 04:13 AM    MCH 32.3 02/11/2021 09:31 AM    MCHC 34.9 12/06/2023 04:13 AM    MCHC 33.9 02/11/2021 09:31 AM    RDW 13.2 12/06/2023 04:13 AM    RDW 43.4 02/11/2021 09:31 AM    MPV 9.0 02/11/2021 09:31 AM       Lab Results   Component Value Date/Time    SODIUM 136 06/09/2025 06:35 AM    SODIUM 135 01/22/2024 03:35 PM    POTASSIUM 4.5 06/09/2025 06:35 AM    POTASSIUM 4.2 01/22/2024 03:35 PM    CHLORIDE 99 06/09/2025 06:35 AM    CHLORIDE 101 01/22/2024 03:35 PM    CO2 19 (L) 06/09/2025 06:35 AM    CO2 23 01/22/2024 03:35 PM    ANION 11.0 01/22/2024 03:35 PM    GLUCOSE 129 (H) 06/09/2025 06:35 AM    GLUCOSE 139 (H) 01/22/2024 03:35 PM    BUN 14 06/09/2025 06:35 AM    BUN 16 01/22/2024 03:35 PM    CREATININE 0.89 06/09/2025 06:35 AM    CREATININE 0.75 01/22/2024 03:35 PM    CALCIUM 9.5 06/09/2025 06:35 AM    CALCIUM 9.0 01/22/2024 03:35 PM    ASTSGOT 42 (H) 06/09/2025 06:35 AM    ASTSGOT 59 (H) 01/22/2024 03:35 PM    ALTSGPT 68 (H) 06/09/2025 06:35 AM    ALTSGPT 97 (H) 01/22/2024 03:35 PM    TBILIRUBIN 0.5 06/09/2025 06:35 AM    TBILIRUBIN 0.5 01/22/2024 03:35 PM    ALBUMIN 4.2 (L) 06/09/2025 06:35 AM    ALBUMIN 4.0 01/22/2024 03:35 PM    ALBUMIN 3.52 (L) 06/29/2021 01:33 PM    TOTPROTEIN 7.6 06/09/2025 06:35 AM    TOTPROTEIN 8.7 (H) 01/22/2024 03:35 PM    TOTPROTEIN 7.3 06/29/2021 01:33 PM    GLOBULIN 3.4 06/09/2025 06:35 AM    GLOBULIN 4.7 (H) 01/22/2024 03:35 PM    AGRATIO 1.0 (L) 04/26/2024 11:50 AM    AGRATIO 0.9 01/22/2024 03:35 PM       Lab Results   Component Value Date/Time    TSHULTRASEN <0.005 (L) 01/13/2020 0850     Lab Results   Component Value Date/Time    FREET4 3.08 (H) 01/13/2020 0850     Lab Results   Component Value Date/Time    FREET3  20.30 (H) 01/13/2020 0850     No results found for: THYSTIMIG      Imaging: Please refer to thyroid uptake and scan from May 2019      ASSESSMENT/PLAN:   1. Type 2 diabetes mellitus without complication, without long-term current use of insulin (HCC) (Primary)  Stable  Medication:  Metformin 1000 mg BID - continue  Recommend diet low in fats and carbs  Recommend 30 mins of activity daily   - Comp Metabolic Panel; Future    2. Hyperthyroidism  Stable  Patient has been euthroid for a year.  Medication:  Methimazole 10 mg 6 days a week - change to methimazole 10 mg 5 days a week  I decreased his dose slightly as his TSH was 2.6  - TSH; Future  - T3 FREE; Future  - FREE THYROXINE; Future    3. Graves disease  This is the etiology of his hyperthyroidism  - TSI; Future  - THYROTROPIN RECEP AB; Future    4. Elevated liver function tests  Improving  This is followed by GI    5. Hypertriglyceridemia  Stable  Continue current regimen- see hPI  We will continue to monitor    6. Vitamin D insufficiency  Stable  Continue current regimen- see hPI  - VITAMIN D,25 HYDROXY (DEFICIENCY); Future    7. High risk medication use  Patient is taking methimazole which is a high risk medication. I explained the side effects and rarely it can cause liver enzyme elevation and decrease in neutrophil counts and vasculitis.       Return in about 4 months (around 10/11/2025). Patient will have blood work done prior to follow up in 4 months.     Thank you kindly for allowing me to participate in the thyroid care plan for this patient.    Miller Pretty, GLENNY   6/11/25    CC:   INGRIS Jose.

## 2025-06-12 LAB
HBA1C MFR BLD: 6.3 % (ref ?–5.8)
POCT INT CON NEG: NEGATIVE
POCT INT CON POS: POSITIVE

## 2025-06-20 DIAGNOSIS — K76.0 FATTY LIVER: ICD-10-CM

## 2025-06-20 NOTE — TELEPHONE ENCOUNTER
Received request via: Pharmacy    Was the patient seen in the last year in this department? No June 2024    Does the patient have an active prescription (recently filled or refills available) for medication(s) requested? No    Pharmacy Name: [id number] - [place], [place] - [address]     Does the patient have detention Plus and need 100-day supply? (This applies to ALL medications) Patient does not have SCP

## 2025-06-21 RX ORDER — ATORVASTATIN CALCIUM 10 MG/1
10 TABLET, FILM COATED ORAL EVERY EVENING
Qty: 30 TABLET | Refills: 0 | Status: SHIPPED | OUTPATIENT
Start: 2025-06-21